# Patient Record
Sex: MALE | Race: OTHER | NOT HISPANIC OR LATINO
[De-identification: names, ages, dates, MRNs, and addresses within clinical notes are randomized per-mention and may not be internally consistent; named-entity substitution may affect disease eponyms.]

---
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
## 2022-01-03 ENCOUNTER — APPOINTMENT (OUTPATIENT)
Dept: PSYCHIATRY | Facility: CLINIC | Age: 22
End: 2022-01-03

## 2022-01-06 ENCOUNTER — APPOINTMENT (OUTPATIENT)
Dept: PSYCHIATRY | Facility: CLINIC | Age: 22
End: 2022-01-06

## 2022-01-10 ENCOUNTER — APPOINTMENT (OUTPATIENT)
Dept: PSYCHIATRY | Facility: CLINIC | Age: 22
End: 2022-01-10

## 2022-01-13 ENCOUNTER — APPOINTMENT (OUTPATIENT)
Dept: PSYCHIATRY | Facility: CLINIC | Age: 22
End: 2022-01-13

## 2022-01-20 ENCOUNTER — APPOINTMENT (OUTPATIENT)
Dept: PSYCHIATRY | Facility: CLINIC | Age: 22
End: 2022-01-20

## 2022-01-24 ENCOUNTER — APPOINTMENT (OUTPATIENT)
Dept: PSYCHIATRY | Facility: CLINIC | Age: 22
End: 2022-01-24

## 2022-01-24 ENCOUNTER — NON-APPOINTMENT (OUTPATIENT)
Age: 22
End: 2022-01-24

## 2022-01-27 ENCOUNTER — APPOINTMENT (OUTPATIENT)
Dept: PSYCHIATRY | Facility: CLINIC | Age: 22
End: 2022-01-27

## 2022-01-31 ENCOUNTER — APPOINTMENT (OUTPATIENT)
Dept: PSYCHIATRY | Facility: CLINIC | Age: 22
End: 2022-01-31

## 2022-02-03 ENCOUNTER — APPOINTMENT (OUTPATIENT)
Dept: PSYCHIATRY | Facility: CLINIC | Age: 22
End: 2022-02-03

## 2022-02-07 ENCOUNTER — APPOINTMENT (OUTPATIENT)
Dept: PSYCHIATRY | Facility: CLINIC | Age: 22
End: 2022-02-07

## 2022-02-10 ENCOUNTER — APPOINTMENT (OUTPATIENT)
Dept: PSYCHIATRY | Facility: CLINIC | Age: 22
End: 2022-02-10

## 2022-02-14 ENCOUNTER — APPOINTMENT (OUTPATIENT)
Dept: PSYCHIATRY | Facility: CLINIC | Age: 22
End: 2022-02-14

## 2022-02-17 ENCOUNTER — APPOINTMENT (OUTPATIENT)
Dept: PSYCHIATRY | Facility: CLINIC | Age: 22
End: 2022-02-17

## 2022-02-24 ENCOUNTER — APPOINTMENT (OUTPATIENT)
Dept: PSYCHIATRY | Facility: CLINIC | Age: 22
End: 2022-02-24

## 2022-02-28 ENCOUNTER — APPOINTMENT (OUTPATIENT)
Dept: PSYCHIATRY | Facility: CLINIC | Age: 22
End: 2022-02-28

## 2022-03-03 ENCOUNTER — APPOINTMENT (OUTPATIENT)
Dept: PSYCHIATRY | Facility: CLINIC | Age: 22
End: 2022-03-03

## 2022-03-07 ENCOUNTER — APPOINTMENT (OUTPATIENT)
Dept: PSYCHIATRY | Facility: CLINIC | Age: 22
End: 2022-03-07

## 2022-03-10 ENCOUNTER — APPOINTMENT (OUTPATIENT)
Dept: PSYCHIATRY | Facility: CLINIC | Age: 22
End: 2022-03-10

## 2022-03-14 ENCOUNTER — APPOINTMENT (OUTPATIENT)
Dept: PSYCHIATRY | Facility: CLINIC | Age: 22
End: 2022-03-14

## 2022-03-17 ENCOUNTER — APPOINTMENT (OUTPATIENT)
Dept: PSYCHIATRY | Facility: CLINIC | Age: 22
End: 2022-03-17

## 2022-03-21 ENCOUNTER — APPOINTMENT (OUTPATIENT)
Dept: PSYCHIATRY | Facility: CLINIC | Age: 22
End: 2022-03-21

## 2022-03-24 ENCOUNTER — APPOINTMENT (OUTPATIENT)
Dept: PSYCHIATRY | Facility: CLINIC | Age: 22
End: 2022-03-24

## 2022-03-28 ENCOUNTER — APPOINTMENT (OUTPATIENT)
Dept: PSYCHIATRY | Facility: CLINIC | Age: 22
End: 2022-03-28

## 2022-03-31 ENCOUNTER — APPOINTMENT (OUTPATIENT)
Dept: PSYCHIATRY | Facility: CLINIC | Age: 22
End: 2022-03-31

## 2022-04-04 ENCOUNTER — APPOINTMENT (OUTPATIENT)
Dept: PSYCHIATRY | Facility: CLINIC | Age: 22
End: 2022-04-04

## 2022-04-07 ENCOUNTER — APPOINTMENT (OUTPATIENT)
Dept: PSYCHIATRY | Facility: CLINIC | Age: 22
End: 2022-04-07

## 2022-04-11 ENCOUNTER — APPOINTMENT (OUTPATIENT)
Dept: PSYCHIATRY | Facility: CLINIC | Age: 22
End: 2022-04-11

## 2022-04-14 ENCOUNTER — APPOINTMENT (OUTPATIENT)
Dept: PSYCHIATRY | Facility: CLINIC | Age: 22
End: 2022-04-14

## 2022-04-18 ENCOUNTER — APPOINTMENT (OUTPATIENT)
Dept: PSYCHIATRY | Facility: CLINIC | Age: 22
End: 2022-04-18

## 2022-04-21 ENCOUNTER — APPOINTMENT (OUTPATIENT)
Dept: PSYCHIATRY | Facility: CLINIC | Age: 22
End: 2022-04-21

## 2022-04-25 ENCOUNTER — APPOINTMENT (OUTPATIENT)
Dept: PSYCHIATRY | Facility: CLINIC | Age: 22
End: 2022-04-25

## 2022-04-28 ENCOUNTER — APPOINTMENT (OUTPATIENT)
Dept: PSYCHIATRY | Facility: CLINIC | Age: 22
End: 2022-04-28

## 2022-05-02 ENCOUNTER — APPOINTMENT (OUTPATIENT)
Dept: PSYCHIATRY | Facility: CLINIC | Age: 22
End: 2022-05-02

## 2022-05-05 ENCOUNTER — APPOINTMENT (OUTPATIENT)
Dept: PSYCHIATRY | Facility: CLINIC | Age: 22
End: 2022-05-05

## 2022-05-09 ENCOUNTER — APPOINTMENT (OUTPATIENT)
Dept: PSYCHIATRY | Facility: CLINIC | Age: 22
End: 2022-05-09

## 2022-05-12 ENCOUNTER — APPOINTMENT (OUTPATIENT)
Dept: PSYCHIATRY | Facility: CLINIC | Age: 22
End: 2022-05-12

## 2022-05-16 ENCOUNTER — APPOINTMENT (OUTPATIENT)
Dept: PSYCHIATRY | Facility: CLINIC | Age: 22
End: 2022-05-16

## 2022-05-19 ENCOUNTER — APPOINTMENT (OUTPATIENT)
Dept: PSYCHIATRY | Facility: CLINIC | Age: 22
End: 2022-05-19

## 2022-05-23 ENCOUNTER — APPOINTMENT (OUTPATIENT)
Dept: PSYCHIATRY | Facility: CLINIC | Age: 22
End: 2022-05-23

## 2022-05-26 ENCOUNTER — APPOINTMENT (OUTPATIENT)
Dept: PSYCHIATRY | Facility: CLINIC | Age: 22
End: 2022-05-26

## 2022-06-02 ENCOUNTER — APPOINTMENT (OUTPATIENT)
Dept: PSYCHIATRY | Facility: CLINIC | Age: 22
End: 2022-06-02

## 2022-06-06 ENCOUNTER — APPOINTMENT (OUTPATIENT)
Dept: PSYCHIATRY | Facility: CLINIC | Age: 22
End: 2022-06-06

## 2022-06-09 ENCOUNTER — APPOINTMENT (OUTPATIENT)
Dept: PSYCHIATRY | Facility: CLINIC | Age: 22
End: 2022-06-09

## 2022-06-13 ENCOUNTER — APPOINTMENT (OUTPATIENT)
Dept: PSYCHIATRY | Facility: CLINIC | Age: 22
End: 2022-06-13

## 2022-06-16 ENCOUNTER — APPOINTMENT (OUTPATIENT)
Dept: PSYCHIATRY | Facility: CLINIC | Age: 22
End: 2022-06-16

## 2022-06-20 ENCOUNTER — APPOINTMENT (OUTPATIENT)
Dept: PSYCHIATRY | Facility: CLINIC | Age: 22
End: 2022-06-20

## 2022-06-23 ENCOUNTER — APPOINTMENT (OUTPATIENT)
Dept: PSYCHIATRY | Facility: CLINIC | Age: 22
End: 2022-06-23

## 2022-06-27 ENCOUNTER — APPOINTMENT (OUTPATIENT)
Dept: PSYCHIATRY | Facility: CLINIC | Age: 22
End: 2022-06-27

## 2022-06-30 ENCOUNTER — APPOINTMENT (OUTPATIENT)
Dept: PSYCHIATRY | Facility: CLINIC | Age: 22
End: 2022-06-30

## 2022-09-12 ENCOUNTER — APPOINTMENT (OUTPATIENT)
Dept: PSYCHIATRY | Facility: CLINIC | Age: 22
End: 2022-09-12

## 2022-09-15 ENCOUNTER — APPOINTMENT (OUTPATIENT)
Dept: PSYCHIATRY | Facility: CLINIC | Age: 22
End: 2022-09-15

## 2022-09-19 ENCOUNTER — APPOINTMENT (OUTPATIENT)
Dept: PSYCHIATRY | Facility: CLINIC | Age: 22
End: 2022-09-19

## 2022-09-19 NOTE — SOCIAL HISTORY
[With Family] : lives with family [Unemployed] : unemployed [Never ] : never  [High School] : high school [None] : none [FreeTextEntry1] : Living with Mother and Father in separate homes.  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

## 2022-09-19 NOTE — BEHAVIORAL HEALTH
[No] : no [FreeTextEntry2] : 1. Increase behavioral skills to reduce symptoms of anxiety and depression\par 2.Begin to identify and name experiences that build on sense of self\par 3. Begin to identify underlying emotions that drive maladaptive behaviors.  [Psychodynamic Therapy] : Psychodynamic Therapy  [Supportive Therapy] : Supportive Therapy [de-identified] : Pt arrived on time for session.  Pt discussed recent reunion with ex-girlfriend, as well as shifts in their dynamic since their break-up. Pt and writer discussed pros and cons of resuming a relationship with ex-girlfriend.  Pt ended session in good behavioral and emotional control.  [Recommended Frequency of Visits: ____] : Recommended frequency of visits: [unfilled] [Return in ____ week(s)] : Return in [unfilled] week(s) [FreeTextEntry1] : Pt will continue with twice weekly psychotherapy treatment.

## 2022-09-19 NOTE — REASON FOR VISIT
[Patient] : patient, [unfilled] is a ~age~ year old ~male/female~ being seen for a follow-up visit  [Duration of Psychotherapy Visit (minutes spent in synchronous communication): ____] : Duration of Psychotherapy Visit (minutes spent in synchronous communication): [unfilled] [Individual Psychotherapy for 38-52 minutes] : Individual Psychotherapy for 38 - 52 minutes [Teletherapy Service Provided] : The services provided in this session were delivered via tele-therapy [FreeTextEntry3] : SANGEETA RUTHERFORD  [FreeTextEntry5] : SANGEETA RUTHERFORD  [Home] : at home, [unfilled] , at the time of the visit. [Other Location: e.g. Home (Enter Location, City,State)___] : at [unfilled] [Verbal consent obtained from patient] : the patient, [unfilled]

## 2022-09-19 NOTE — END OF VISIT
[Duration of Psychotherapy Visit (minutes spent in synchronous communication): ____] : Duration of Psychotherapy Visit (minutes spent in synchronous communication): [unfilled] [Individual Psychotherapy for 38-52 minutes] : Individual Psychotherapy for 38 - 52 minutes [Teletherapy Service Provided] : The services provided in this session were delivered via tele-therapy [FreeTextEntry3] : SANGEETA Moon [FreeTextEntry5] : SANGEETA RUTHERFORD

## 2022-09-19 NOTE — ADDENDUM
[FreeTextEntry1] : .\par .\par Case discussed in clinical supervision Dr. Diann Barbosa  [[X ]] individual [[ ]] group (Check one)\par ASSESSMENT METHOD (Check all that apply): \par [[X ]] Case presentation \par [[ ]] Review of Record/Data \par [[ ]] Direct Observation \par [[ ]] Audio Recording \par [[  ]] Video Recording \par FOCUS OF SUPERVISION (Check all that apply): \par [[ ]] Diagnosis & Assessment \par [[ X]] Intervention \par [[ ]] Professional Conduct \par [[ X]] Culture and Diversity \par [[ ]] Scholarly Inquiry   \par [[ ]] Consultation \par [[ ]] Supervision \par [[ ]] Administration/Documentation \par

## 2022-09-22 ENCOUNTER — APPOINTMENT (OUTPATIENT)
Dept: PSYCHIATRY | Facility: CLINIC | Age: 22
End: 2022-09-22

## 2022-09-26 ENCOUNTER — APPOINTMENT (OUTPATIENT)
Dept: PSYCHIATRY | Facility: CLINIC | Age: 22
End: 2022-09-26

## 2022-09-29 ENCOUNTER — APPOINTMENT (OUTPATIENT)
Dept: PSYCHIATRY | Facility: CLINIC | Age: 22
End: 2022-09-29

## 2022-10-03 ENCOUNTER — NON-APPOINTMENT (OUTPATIENT)
Age: 22
End: 2022-10-03

## 2022-10-03 ENCOUNTER — APPOINTMENT (OUTPATIENT)
Dept: PSYCHIATRY | Facility: CLINIC | Age: 22
End: 2022-10-03

## 2022-10-06 ENCOUNTER — APPOINTMENT (OUTPATIENT)
Dept: PSYCHIATRY | Facility: CLINIC | Age: 22
End: 2022-10-06

## 2022-10-07 NOTE — BEHAVIORAL HEALTH
[Psychodynamic Therapy] : Psychodynamic Therapy  [Supportive Therapy] : Supportive Therapy [Recommended Frequency of Visits: ____] : Recommended frequency of visits: [unfilled] [Return in ____ week(s)] : Return in [unfilled] week(s) [FreeTextEntry2] : 1. Increase behavioral skills to reduce symptoms of anxiety and depression\par 2.Begin to identify and name experiences that build on sense of self\par 3. Begin to identify underlying emotions that drive maladaptive behaviors.  [de-identified] : Mr. Joya arrived a few minutes late for session.  Mr. Joya discussed a reduction in anxiety/panic symptoms this week.  Mr. Joya discussed confronting and addressing his feelings related to conflict with other.  Writer continued to work with Mr. Joya to manage and confront feelings related to anxiety.  Mr. Joya ended session in good behavioral and emotional control.  [FreeTextEntry1] : Pt will continue with twice weekly psychotherapy treatment.

## 2022-10-07 NOTE — REASON FOR VISIT
[Telehealth (audio & video) - Individual/Group] : This visit was provided via telehealth using real-time 2-way audio visual technology. [Other Location: e.g. Home (Enter Location, City,State)___] : The patient, [unfilled], was located at [unfilled] at the time of the visit. [Verbal consent obtained from patient/other participant(s)] : Verbal consent for telehealth/telephonic services obtained from patient/other participant(s) [Patient] : Patient [FreeTextEntry1] : Pt arrived for weekly psychotherapy session.

## 2022-10-07 NOTE — PHYSICAL EXAM
[Average] : average [Cooperative] : cooperative [Euthymic] : euthymic [Full] : full [Clear] : clear [Linear/Goal Directed] : linear/goal directed [WNL] : within normal limits [Not applicable] : not applicable

## 2022-10-07 NOTE — ADDENDUM
[FreeTextEntry1] : .\par .\par Case discussed in clinical supervision Dr. Flaquito Kimbrough  [[X ]] individual [[ ]] group (Check one)\par ASSESSMENT METHOD (Check all that apply): \par [[X ]] Case presentation \par [[ ]] Review of Record/Data \par [[ ]] Direct Observation \par [[ ]] Audio Recording \par [[  ]] Video Recording \par FOCUS OF SUPERVISION (Check all that apply): \par [[ ]] Diagnosis & Assessment \par [[ X]] Intervention \par [[ ]] Professional Conduct \par [[ ]] Culture and Diversity \par [[ ]] Scholarly Inquiry   \par [[ ]] Consultation \par [[ ]] Supervision \par [[ ]] Administration/Documentation \par

## 2022-10-10 ENCOUNTER — APPOINTMENT (OUTPATIENT)
Dept: PSYCHIATRY | Facility: CLINIC | Age: 22
End: 2022-10-10

## 2022-10-11 NOTE — REASON FOR VISIT
[Patient] : Patient [Telehealth (audio & video) - Individual/Group] : This visit was provided via telehealth using real-time 2-way audio visual technology. [Other Location: e.g. Home (Enter Location, City,State)___] : The patient, [unfilled], was located at [unfilled] at the time of the visit. [Verbal consent obtained from patient/other participant(s)] : Verbal consent for telehealth/telephonic services obtained from patient/other participant(s) [FreeTextEntry1] : Pt arrived for weekly psychotherapy session.

## 2022-10-11 NOTE — PLAN
[Psychodynamic Therapy] : Psychodynamic Therapy  [Supportive Therapy] : Supportive Therapy [Recommended Frequency of Visits: ____] : Recommended frequency of visits: [unfilled] [Return in ____ week(s)] : Return in [unfilled] week(s) [FreeTextEntry2] : 1. Increase behavioral skills to reduce symptoms of anxiety and depression\par 2.Begin to identify and name experiences that build on sense of self\par 3. Begin to identify underlying emotions that drive maladaptive behaviors.  [de-identified] : Mr. Joya arrived on time for session.  Mr. Joya reported feeling physically under the weather and appeared to be feeling physically unwell. Writer discussed care and rest during this time.  Mr. Joya decided to end session 20 minutes early to rest.  Mr. Joya ended session in good behavioral and emotional control.  [FreeTextEntry1] : Pt will continue with twice weekly psychotherapy treatment.

## 2022-10-13 ENCOUNTER — APPOINTMENT (OUTPATIENT)
Dept: PSYCHIATRY | Facility: CLINIC | Age: 22
End: 2022-10-13

## 2022-10-14 NOTE — PLAN
[Psychodynamic Therapy] : Psychodynamic Therapy  [Supportive Therapy] : Supportive Therapy [Recommended Frequency of Visits: ____] : Recommended frequency of visits: [unfilled] [Return in ____ week(s)] : Return in [unfilled] week(s) [FreeTextEntry2] : 1. Increase behavioral skills to reduce symptoms of anxiety and depression\par 2.Begin to identify and name experiences that build on sense of self\par 3. Begin to identify underlying emotions that drive maladaptive behaviors.  [de-identified] : Mr. Joya arrived on time for session.  Mr. Joya reported taking time off this week to rest and recover from illness.  Mr. Joya discussed navigating recent interpersonal family dynamics, as he figures out where to divide his time during the upcoming holidays between his mother and father.   Mr. Joya ended session in good behavioral and emotional control.  [FreeTextEntry1] : Pt will continue with twice weekly psychotherapy treatment.

## 2022-10-17 ENCOUNTER — APPOINTMENT (OUTPATIENT)
Dept: PSYCHIATRY | Facility: CLINIC | Age: 22
End: 2022-10-17

## 2022-10-20 ENCOUNTER — APPOINTMENT (OUTPATIENT)
Dept: PSYCHIATRY | Facility: CLINIC | Age: 22
End: 2022-10-20

## 2022-10-20 NOTE — PHYSICAL EXAM
[Average] : average [Cooperative] : cooperative [Euthymic] : euthymic [Clear] : clear [Full] : full [Linear/Goal Directed] : linear/goal directed [WNL] : within normal limits [Not applicable] : not applicable

## 2022-10-20 NOTE — PLAN
[Psychodynamic Therapy] : Psychodynamic Therapy  [Supportive Therapy] : Supportive Therapy [Recommended Frequency of Visits: ____] : Recommended frequency of visits: [unfilled] [Return in ____ week(s)] : Return in [unfilled] week(s) [FreeTextEntry2] : 1. Increase behavioral skills to reduce symptoms of anxiety and depression\par 2.Begin to identify and name experiences that build on sense of self\par 3. Begin to identify underlying emotions that drive maladaptive behaviors.  [de-identified] : Mr. Joya arrived on time for session.  Mr. Joya discussed challenges in academic semester related to burn out and loss of motivation.  Mr. Joya and writer explored root of procrastination and strategies for effective work planning/assignment completion.   Mr. Joya ended session in good behavioral and emotional control.  [FreeTextEntry1] : Pt will continue with twice weekly psychotherapy treatment.

## 2022-10-21 NOTE — PLAN
[Psychodynamic Therapy] : Psychodynamic Therapy  [Supportive Therapy] : Supportive Therapy [Recommended Frequency of Visits: ____] : Recommended frequency of visits: [unfilled] [Return in ____ week(s)] : Return in [unfilled] week(s) [FreeTextEntry2] : 1. Increase behavioral skills to reduce symptoms of anxiety and depression\par 2.Begin to identify and name experiences that build on sense of self\par 3. Begin to identify underlying emotions that drive maladaptive behaviors.  [de-identified] : Mr. Joya arrived on time for session.  Mr. Joya discussed difficulties across interpersonal functioning in social and home settings.  Writer used relational strategies to understand/acknowledge emotions that are present in interpersonal interactions.   Mr. Joya ended session in good behavioral and emotional control.  [FreeTextEntry1] : Pt will continue with twice weekly psychotherapy treatment.

## 2022-10-24 ENCOUNTER — APPOINTMENT (OUTPATIENT)
Dept: PSYCHIATRY | Facility: CLINIC | Age: 22
End: 2022-10-24

## 2022-10-26 NOTE — PLAN
[Psychodynamic Therapy] : Psychodynamic Therapy  [Supportive Therapy] : Supportive Therapy [Recommended Frequency of Visits: ____] : Recommended frequency of visits: [unfilled] [Return in ____ week(s)] : Return in [unfilled] week(s) [FreeTextEntry2] : 1. Increase behavioral skills to reduce symptoms of anxiety and depression\par 2.Begin to identify and name experiences that build on sense of self\par 3. Begin to identify underlying emotions that drive maladaptive behaviors.  [de-identified] : Mr. Joya arrived on time for session.  Mr. Joya discussed recent mood, specifically irritability.  Mr. Joya also continued to discuss low motivation and its impact on his academic demands at this time.  Writer worked with Mr. Joya to explore mood and its influence on different aspects of functioning.   Mr. Joya ended session in good behavioral and emotional control.  [FreeTextEntry1] : Pt will continue with twice weekly psychotherapy treatment.

## 2022-10-27 ENCOUNTER — APPOINTMENT (OUTPATIENT)
Dept: PSYCHIATRY | Facility: CLINIC | Age: 22
End: 2022-10-27

## 2022-10-28 NOTE — PLAN
[Psychodynamic Therapy] : Psychodynamic Therapy  [Supportive Therapy] : Supportive Therapy [Recommended Frequency of Visits: ____] : Recommended frequency of visits: [unfilled] [Return in ____ week(s)] : Return in [unfilled] week(s) [FreeTextEntry2] : 1. Increase behavioral skills to reduce symptoms of anxiety and depression\par 2.Begin to identify and name experiences that build on sense of self\par 3. Begin to identify underlying emotions that drive maladaptive behaviors.  [de-identified] : Mr. Joya arrived on time for session.  Mr. Joya discussed recent struggles with his memory and sharing his interests with others.  Writer worked with Mr. Joya to explore obstacles that get in the way of sharing/deepening emotional connection with others.   Mr. Joya ended session in good behavioral and emotional control.  [FreeTextEntry1] : Pt will continue with twice weekly psychotherapy treatment.

## 2022-10-31 ENCOUNTER — APPOINTMENT (OUTPATIENT)
Dept: PSYCHIATRY | Facility: CLINIC | Age: 22
End: 2022-10-31

## 2022-11-01 NOTE — PLAN
[Psychodynamic Therapy] : Psychodynamic Therapy  [Supportive Therapy] : Supportive Therapy [Recommended Frequency of Visits: ____] : Recommended frequency of visits: [unfilled] [Return in ____ week(s)] : Return in [unfilled] week(s) [FreeTextEntry2] : 1. Increase behavioral skills to reduce symptoms of anxiety and depression\par 2.Begin to identify and name experiences that build on sense of self\par 3. Begin to identify underlying emotions that drive maladaptive behaviors.  [de-identified] : Mr. Joya arrived on time for session.  Mr. Joya discussed parts of himself that he hopes to change as it relates to dynamics in his interpersonal relationships.  Writer worked with Mr. Joya to explore relational aspects of his own thoughts/feelings with others.   Mr. Joya ended session in good behavioral and emotional control.  [FreeTextEntry1] : Pt will continue with twice weekly psychotherapy treatment.

## 2022-11-03 ENCOUNTER — APPOINTMENT (OUTPATIENT)
Dept: PSYCHIATRY | Facility: CLINIC | Age: 22
End: 2022-11-03

## 2022-11-04 NOTE — PLAN
[Psychodynamic Therapy] : Psychodynamic Therapy  [Supportive Therapy] : Supportive Therapy [Recommended Frequency of Visits: ____] : Recommended frequency of visits: [unfilled] [Return in ____ week(s)] : Return in [unfilled] week(s) [FreeTextEntry2] : 1. Increase behavioral skills to reduce symptoms of anxiety and depression\par 2.Begin to identify and name experiences that build on sense of self\par 3. Begin to identify underlying emotions that drive maladaptive behaviors.  [de-identified] : Mr. Joya arrived on time for session.  Mr. Joya discussed recent interactions with his father and grandparents.  Mr. Joya has begun to explore feelings of frustration towards his family. Writer continued to work with Mr. Joya to explore relational aspects of his own thoughts/feelings with others.   Mr. Joya ended session in good behavioral and emotional control.  [FreeTextEntry1] : Pt will continue with twice weekly psychotherapy treatment.

## 2022-11-07 ENCOUNTER — APPOINTMENT (OUTPATIENT)
Dept: PSYCHIATRY | Facility: CLINIC | Age: 22
End: 2022-11-07

## 2022-11-10 ENCOUNTER — APPOINTMENT (OUTPATIENT)
Dept: PSYCHIATRY | Facility: CLINIC | Age: 22
End: 2022-11-10

## 2022-11-11 NOTE — PLAN
[Psychodynamic Therapy] : Psychodynamic Therapy  [Recommended Frequency of Visits: ____] : Recommended frequency of visits: [unfilled] [Return in ____ week(s)] : Return in [unfilled] week(s) [FreeTextEntry2] : 1. Increase behavioral skills to reduce symptoms of anxiety and depression\par 2.Begin to identify and name experiences that build on sense of self\par 3. Begin to identify underlying emotions that drive maladaptive behaviors.  [de-identified] : Mr. Joya arrived on time for session.  Mr. Joya discussed navigating recent conflict and feelings of frustration towards father.  Mr. Joya explored wishes for feeling more connected with family, specifically his father, while beginning to separate and solidify his own identity.  Writer continued to work with Mr. Joya to explore relational aspects of his own thoughts/feelings with others.   Mr. Joya ended session in good behavioral and emotional control.  [FreeTextEntry1] : Pt will continue with twice weekly psychotherapy treatment.

## 2022-11-11 NOTE — PLAN
[Psychodynamic Therapy] : Psychodynamic Therapy  [Supportive Therapy] : Supportive Therapy [Recommended Frequency of Visits: ____] : Recommended frequency of visits: [unfilled] [Return in ____ week(s)] : Return in [unfilled] week(s) [FreeTextEntry2] : 1. Increase behavioral skills to reduce symptoms of anxiety and depression\par 2.Begin to identify and name experiences that build on sense of self\par 3. Begin to identify underlying emotions that drive maladaptive behaviors.  [de-identified] : Mr. Joya arrived on time for session.  Mr. Joya discussed recent loss of motivation related to school work. Writer continued to work with Mr. Joya to explore relational aspects of his own thoughts/feelings with others.  Mr. Joya left session 15 minutes early to meet with professors related to a semester academic event.  Mr. Joya ended session in good behavioral and emotional control.  [FreeTextEntry1] : Pt will continue with twice weekly psychotherapy treatment.

## 2022-11-14 ENCOUNTER — APPOINTMENT (OUTPATIENT)
Dept: PSYCHIATRY | Facility: CLINIC | Age: 22
End: 2022-11-14

## 2022-11-16 NOTE — PLAN
[Psychodynamic Therapy] : Psychodynamic Therapy  [Recommended Frequency of Visits: ____] : Recommended frequency of visits: [unfilled] [Return in ____ week(s)] : Return in [unfilled] week(s) [FreeTextEntry2] : 1. Increase behavioral skills to reduce symptoms of anxiety and depression\par 2.Begin to identify and name experiences that build on sense of self\par 3. Begin to identify underlying emotions that drive maladaptive behaviors.  [de-identified] : Mr. Joya arrived on time for session.  Mr. Joya discussed recent class trip to historic site and connections to identity and interest. Mr. Joya also spoke about exploring new relationship and feeling hopeful about new romantic connections.  Writer continued to work with Mr. Joya to explore relational aspects of his own thoughts/feelings with others.   Mr. Joya ended session in good behavioral and emotional control.  [FreeTextEntry1] : Pt will continue with twice weekly psychotherapy treatment.

## 2022-11-17 ENCOUNTER — APPOINTMENT (OUTPATIENT)
Dept: PSYCHIATRY | Facility: CLINIC | Age: 22
End: 2022-11-17

## 2022-11-18 NOTE — PLAN
[Psychodynamic Therapy] : Psychodynamic Therapy  [Recommended Frequency of Visits: ____] : Recommended frequency of visits: [unfilled] [Return in ____ week(s)] : Return in [unfilled] week(s) [FreeTextEntry2] : 1. Increase behavioral skills to reduce symptoms of anxiety and depression\par 2.Begin to identify and name experiences that build on sense of self\par 3. Begin to identify underlying emotions that drive maladaptive behaviors.  [de-identified] : Mr. Joya arrived on time for session.  Mr. Joya discussed recent budding romantic interest as well as navigating his time next week at home with his father for the upcoming holidays.  Writer continued to work with Mr. Joya to explore relational aspects of his own thoughts/feelings with others.   Mr. Joya ended session in good behavioral and emotional control.  [FreeTextEntry1] : Pt will continue with twice weekly psychotherapy treatment.

## 2022-11-21 ENCOUNTER — APPOINTMENT (OUTPATIENT)
Dept: PSYCHIATRY | Facility: CLINIC | Age: 22
End: 2022-11-21

## 2022-11-28 ENCOUNTER — APPOINTMENT (OUTPATIENT)
Dept: PSYCHIATRY | Facility: CLINIC | Age: 22
End: 2022-11-28

## 2022-11-30 NOTE — PLAN
[Psychodynamic Therapy] : Psychodynamic Therapy  [Recommended Frequency of Visits: ____] : Recommended frequency of visits: [unfilled] [Return in ____ week(s)] : Return in [unfilled] week(s) [FreeTextEntry2] : 1. Increase behavioral skills to reduce symptoms of anxiety and depression\par 2.Begin to identify and name experiences that build on sense of self\par 3. Begin to identify underlying emotions that drive maladaptive behaviors.  [de-identified] : Mr. Joya arrived on time for session.  Mr. Joya discussed recent holidays with family and meeting father's partner.  Mr. Joya discussed low motivation and avoidance for finishing academic semester.  Writer worked with Mr. Joya to strategize behavioral skills and strategies to support successful completion of academic responsibilities. Mr. Joya ended session in good behavioral and emotional control.  [FreeTextEntry1] : Pt will continue with twice weekly psychotherapy treatment.

## 2022-12-01 ENCOUNTER — APPOINTMENT (OUTPATIENT)
Dept: PSYCHIATRY | Facility: CLINIC | Age: 22
End: 2022-12-01

## 2022-12-02 NOTE — PLAN
[Psychodynamic Therapy] : Psychodynamic Therapy  [Recommended Frequency of Visits: ____] : Recommended frequency of visits: [unfilled] [Return in ____ week(s)] : Return in [unfilled] week(s) [FreeTextEntry2] : 1. Increase behavioral skills to reduce symptoms of anxiety and depression\par 2.Begin to identify and name experiences that build on sense of self\par 3. Begin to identify underlying emotions that drive maladaptive behaviors.  [de-identified] : Mr. Joya arrived on time for session.  Mr. Joya discussed current dynamics in his relationship with his father.  Writer listened empathically to validate emotional experiences of Mr. Joya. Mr. Joya ended session in good behavioral and emotional control.  [FreeTextEntry1] : Pt will continue with twice weekly psychotherapy treatment.

## 2022-12-05 ENCOUNTER — APPOINTMENT (OUTPATIENT)
Dept: PSYCHIATRY | Facility: CLINIC | Age: 22
End: 2022-12-05

## 2022-12-08 ENCOUNTER — APPOINTMENT (OUTPATIENT)
Dept: PSYCHIATRY | Facility: CLINIC | Age: 22
End: 2022-12-08

## 2022-12-08 NOTE — PLAN
[Psychodynamic Therapy] : Psychodynamic Therapy  [Recommended Frequency of Visits: ____] : Recommended frequency of visits: [unfilled] [Return in ____ week(s)] : Return in [unfilled] week(s) [FreeTextEntry2] : 1. Increase behavioral skills to reduce symptoms of anxiety and depression\par 2.Begin to identify and name experiences that build on sense of self\par 3. Begin to identify underlying emotions that drive maladaptive behaviors.  [de-identified] : Mr. Joya arrived on time for session.  Mr. Joya discussed recent feelings of overwhelm related to the end of the semester.  Mr. Joya discussed feelings of pressure in meeting expectations of his father and grandparents.  Writer listened empathically to validate emotional experiences of Mr. Joya as well as to make sense of feelings of sadness/passive SI. Mr. Joya ended session in good behavioral and emotional control.  [FreeTextEntry1] : Pt will continue with twice weekly psychotherapy treatment.

## 2022-12-08 NOTE — RISK ASSESSMENT
[Yes, patient reports ideation or behavior] : Yes, patient reports ideation or behavior [No, patient denies ideation or behavior] : No, patient denies ideation or behavior [Yes] : Yes [No] : No

## 2022-12-12 ENCOUNTER — APPOINTMENT (OUTPATIENT)
Dept: PSYCHIATRY | Facility: CLINIC | Age: 22
End: 2022-12-12

## 2022-12-12 NOTE — PLAN
[Psychodynamic Therapy] : Psychodynamic Therapy  [Recommended Frequency of Visits: ____] : Recommended frequency of visits: [unfilled] [Return in ____ week(s)] : Return in [unfilled] week(s) [FreeTextEntry2] : 1. Increase behavioral skills to reduce symptoms of anxiety and depression\par 2.Begin to identify and name experiences that build on sense of self\par 3. Begin to identify underlying emotions that drive maladaptive behaviors.  [de-identified] : Mr. Joya arrived on time for session.  Mr. Joya discussed continued feelings of fatigue related to end of semester as well as holding expectations placed on him from father and grandfather.  Writer explored possible medication consultation with Mr. Joya.  Mr. Joya agreed to consider medication but chose to hold off at this time.  Mr. Joya ended session in good behavioral and emotional control.  [FreeTextEntry1] : Pt will continue with twice weekly psychotherapy treatment.

## 2022-12-15 ENCOUNTER — APPOINTMENT (OUTPATIENT)
Dept: PSYCHIATRY | Facility: CLINIC | Age: 22
End: 2022-12-15

## 2022-12-19 ENCOUNTER — APPOINTMENT (OUTPATIENT)
Dept: PSYCHIATRY | Facility: CLINIC | Age: 22
End: 2022-12-19

## 2022-12-21 NOTE — PLAN
[Psychodynamic Therapy] : Psychodynamic Therapy  [Recommended Frequency of Visits: ____] : Recommended frequency of visits: [unfilled] [Return in ____ week(s)] : Return in [unfilled] week(s) [FreeTextEntry2] : 1. Increase behavioral skills to reduce symptoms of anxiety and depression\par 2.Begin to identify and name experiences that build on sense of self\par 3. Begin to identify underlying emotions that drive maladaptive behaviors.  [de-identified] : Mr. Joya arrived on time for session.  Mr. Joya discussed stress and exhaustion related to his final exams week.  Mr. Joya also discussed recent conflict with his father regarding his class schedule for next semester.  Writer worked with Mr. Joya to highlight and acknowledge particularly intense and strong negative affect (i.e. anger) in session towards others.  Mr. Joya ended session in good behavioral and emotional control.  [FreeTextEntry1] : Pt will continue with twice weekly psychotherapy treatment.

## 2022-12-22 ENCOUNTER — APPOINTMENT (OUTPATIENT)
Dept: PSYCHIATRY | Facility: CLINIC | Age: 22
End: 2022-12-22

## 2022-12-23 NOTE — PLAN
[Psychodynamic Therapy] : Psychodynamic Therapy  [Recommended Frequency of Visits: ____] : Recommended frequency of visits: [unfilled] [Return in ____ week(s)] : Return in [unfilled] week(s) [FreeTextEntry2] : 1. Increase behavioral skills to reduce symptoms of anxiety and depression\par 2.Begin to identify and name experiences that build on sense of self\par 3. Begin to identify underlying emotions that drive maladaptive behaviors.  [de-identified] : Mr. Joya arrived on time for session.  Mr. Joya discussed increased stress upon returning home and spending time with family during the holidays.  Writer continued to work with Mr. Joya to highlight and acknowledge particularly intense and strong negative affect (i.e. anger) in session towards others.  Mr. Joya ended session in good behavioral and emotional control.  [FreeTextEntry1] : Pt will continue with twice weekly psychotherapy treatment.

## 2022-12-29 ENCOUNTER — APPOINTMENT (OUTPATIENT)
Dept: PSYCHIATRY | Facility: CLINIC | Age: 22
End: 2022-12-29

## 2023-01-05 ENCOUNTER — APPOINTMENT (OUTPATIENT)
Dept: PSYCHIATRY | Facility: CLINIC | Age: 23
End: 2023-01-05

## 2023-01-06 NOTE — PLAN
[Psychodynamic Therapy] : Psychodynamic Therapy  [Recommended Frequency of Visits: ____] : Recommended frequency of visits: [unfilled] [Return in ____ week(s)] : Return in [unfilled] week(s) [FreeTextEntry2] : 1. Increase behavioral skills to reduce symptoms of anxiety and depression\par 2.Begin to identify and name experiences that build on sense of self\par 3. Begin to identify underlying emotions that drive maladaptive behaviors.  [de-identified] : Mr. Joya arrived on time for session.  Mr. Joya discussed spending time with his mother and her boyfriend during the break.  Mr. Joya also discussed recent stresses during the holidays with his grandparents and father.  Mr. Joya stated that he would like "to better his mental health" before the upcoming spring semester.  Writer discussed strategies and skills during moments of peak anxiety while also discussing a medication intervention and psychiatry referral.  Mr. Joya ended session in good behavioral and emotional control.  [FreeTextEntry1] : Pt will continue with twice weekly psychotherapy treatment.  Writer will complete a referral for a psychiatry consult.

## 2023-01-09 ENCOUNTER — APPOINTMENT (OUTPATIENT)
Dept: PSYCHIATRY | Facility: CLINIC | Age: 23
End: 2023-01-09

## 2023-01-11 NOTE — PLAN
[Psychodynamic Therapy] : Psychodynamic Therapy  [Recommended Frequency of Visits: ____] : Recommended frequency of visits: [unfilled] [Return in ____ week(s)] : Return in [unfilled] week(s) [FreeTextEntry2] : 1. Increase behavioral skills to reduce symptoms of anxiety and depression\par 2.Begin to identify and name experiences that build on sense of self\par 3. Begin to identify underlying emotions that drive maladaptive behaviors.  [de-identified] : Mr. Joya arrived on time for session.  Mr. Joya discussed last few days at home with family before returning to school.  Mr. Joya reported stress related to interactions with family while continuing to prepare for the upcoming spring semester.  Writer continued to work with Mr. Joya on skills and strategies to address anxiety symptoms and depression.  Writer and Mr. Joya discussed expectations regarding the upcoming psychiatry referral.  Mr. Joya discussed  Mr. Joya ended session in good behavioral and emotional control.  [FreeTextEntry1] : Pt will continue with twice weekly psychotherapy treatment.  Psychiatry consult scheduled for  1/26/23.

## 2023-01-12 ENCOUNTER — APPOINTMENT (OUTPATIENT)
Dept: PSYCHIATRY | Facility: CLINIC | Age: 23
End: 2023-01-12

## 2023-01-15 NOTE — PLAN
[Psychodynamic Therapy] : Psychodynamic Therapy  [Recommended Frequency of Visits: ____] : Recommended frequency of visits: [unfilled] [Return in ____ week(s)] : Return in [unfilled] week(s) [FreeTextEntry2] : 1. Increase behavioral skills to reduce symptoms of anxiety and depression\par 2.Begin to identify and name experiences that build on sense of self\par 3. Begin to identify underlying emotions that drive maladaptive behaviors.  [de-identified] : Mr. Joya arrived on time for session.  Mr. Joya discussed returning to school as well as continuing to prepare for upcoming spring semester. Mr. Joya reports continued low motivation, even ahead of academic demands.  Writer worked to highlight Mr. Joya's emotional experiences.  Mr. Joya ended session in good behavioral and emotional control.  [FreeTextEntry1] : Pt will continue with twice weekly psychotherapy treatment.  Psychiatry consult scheduled for  1/26/23.

## 2023-01-15 NOTE — REASON FOR VISIT
[Patient preference] : as per patient preference [Telehealth (audio & video) - Individual/Group] : This visit was provided via telehealth using real-time 2-way audio visual technology. [Verbal consent obtained from patient/other participant(s)] : Verbal consent for telehealth/telephonic services obtained from patient/other participant(s) [Patient] : Patient [Other Location: e.g. Home (Enter Location, City,State)___] : The provider was located at [unfilled]. [FreeTextEntry4] : 4:45 [FreeTextEntry5] : 5:30 [FreeTextEntry1] : Pt arrived for weekly psychotherapy session.

## 2023-01-19 ENCOUNTER — APPOINTMENT (OUTPATIENT)
Dept: PSYCHIATRY | Facility: CLINIC | Age: 23
End: 2023-01-19

## 2023-01-20 NOTE — PLAN
[Psychodynamic Therapy] : Psychodynamic Therapy  [Recommended Frequency of Visits: ____] : Recommended frequency of visits: [unfilled] [Return in ____ week(s)] : Return in [unfilled] week(s) [FreeTextEntry2] : 1. Increase behavioral skills to reduce symptoms of anxiety and depression\par 2.Begin to identify and name experiences that build on sense of self\par 3. Begin to identify underlying emotions that drive maladaptive behaviors.  [de-identified] : Mr. Joya arrived on time for session.  Mr. Joya discussed navigating accepting as well as asking for help from his family while he prepares for the academic demands of his semester. Writer worked to highlight Mr. Joya's emotional experiences as well as reflecting on his own needs and explicitly communicating in order to have those needs met.  Mr. Joya ended session in good behavioral and emotional control.  [FreeTextEntry1] : Pt will continue with twice weekly psychotherapy treatment.  Psychiatry consult scheduled for  1/26/23.

## 2023-01-20 NOTE — REASON FOR VISIT
[Patient preference] : as per patient preference [Telehealth (audio & video) - Individual/Group] : This visit was provided via telehealth using real-time 2-way audio visual technology. [Other Location: e.g. Home (Enter Location, City,State)___] : The patient, [unfilled], was located at [unfilled] at the time of the visit. [Verbal consent obtained from patient/other participant(s)] : Verbal consent for telehealth/telephonic services obtained from patient/other participant(s) [Patient] : Patient [FreeTextEntry4] : 4:45 [FreeTextEntry5] : 5:30 [FreeTextEntry1] : Pt arrived for weekly psychotherapy session.

## 2023-01-23 ENCOUNTER — APPOINTMENT (OUTPATIENT)
Dept: PSYCHIATRY | Facility: CLINIC | Age: 23
End: 2023-01-23

## 2023-01-24 NOTE — PLAN
[Psychodynamic Therapy] : Psychodynamic Therapy  [Recommended Frequency of Visits: ____] : Recommended frequency of visits: [unfilled] [Return in ____ week(s)] : Return in [unfilled] week(s) [FreeTextEntry2] : 1. Increase behavioral skills to reduce symptoms of anxiety and depression\par 2.Begin to identify and name experiences that build on sense of self\par 3. Begin to identify underlying emotions that drive maladaptive behaviors.  [de-identified] : Mr. Joya arrived on time for session.  Mr. Joya discussed his current preparation for his academic semester while planning to manage symptoms of anxiety.  Mr. Joya ended session 15 minutes early due to lack of private space.   Mr. Joya ended session in good behavioral and emotional control.  [FreeTextEntry1] : Pt will continue with twice weekly psychotherapy treatment.  Psychiatry consult scheduled for  1/26/23.

## 2023-01-26 ENCOUNTER — APPOINTMENT (OUTPATIENT)
Dept: PSYCHIATRY | Facility: CLINIC | Age: 23
End: 2023-01-26
Payer: COMMERCIAL

## 2023-01-26 VITALS — HEIGHT: 72 IN | BODY MASS INDEX: 25.06 KG/M2 | WEIGHT: 185 LBS

## 2023-01-26 PROCEDURE — 99215 OFFICE O/P EST HI 40 MIN: CPT | Mod: 95
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
## 2023-01-26 NOTE — PAST MEDICAL HISTORY
[FreeTextEntry1] : Patient has been in psychotherapy treatment since July 2018 with Linda Connell at the psychological center at [facility].  Patient terminated treatment there in May 2021.  Patient has also completed a neuropsychological testing in March 2018.  Patient was diagnosed with IZZY, ADHD, inattentive type, moderate, and MDD, recurring, moderate. Patient denies past psychiatric hospitalization.  Patient was prescribed Adderall 15 mg from pediatrician in August 2020. \par

## 2023-01-26 NOTE — PLAN
[Medication education provided] : Medication education provided. [Rationale for medication choices, possible risks/precautions, benefits, alternative treatment choices, and consequences of non-treatment discussed] : Rationale for medication choices, possible risks/precautions, benefits, alternative treatment choices, and consequences of non-treatment discussed with patient/family/caregiver  [Order(s) for medication placed in [place] EHR] : Medication [FreeTextEntry5] : PLAN: #) Initiate trial of fluoxetine 20mg PO QD #) Continue adderall 15mg PO QD prescribed by juliana #) Continue I therapy #) F/U in 2 weeks

## 2023-01-26 NOTE — SOCIAL HISTORY
[With Family] : lives with family [Unemployed] : unemployed [Never ] : never  [High School] : high school [None] : none [Yes] : yes [FreeTextEntry1] : Living with Mother and Father in separate homes.  Client spends academic year dorming at [employer]  [TextBox_7] : infrequent social use [FreeTextEntry2] : infrequent social use

## 2023-01-26 NOTE — FAMILY HISTORY
[FreeTextEntry1] : Patient denies any family psychiatric history.  However, patient reports that both parents have struggled with substance use.

## 2023-01-26 NOTE — END OF VISIT
[] : Resident [FreeTextEntry3] : Patient is an 21yo  (black/), cis gender, heterosexual male domiciled with his father, rising sophomore at [employer], with no significant PMHx, and PPHx significant for ADHD, IZZY, MDD recurrent, currently prescribed Adderall 15mg PO TID by pediatrician, in psychotherapy with Arielle Goldberg for the past 3 year. Patient does not have a hx of psych hospitalization, suicide attempts, or psych ER visits. Pt. is the only child of  parents. Stressors include interpersonal distress related to relationship with parents and GF. Patient endorses inattentive sxs of ADHD, IZZY, and depression. He reports recent SI but denies plan or intent. No access to fire arms. He also reports recent episodes of binging with recent 25 lb weight gain. Fam Hx: Parents both with hx of substance use. NKDA. Pt denies SI/HI/AVH. \par \par PLAN: #) [facility] – may benefit from psychiatric services but declined #) Pediatrician is treating ADHD – continue meds as prescribed Adderall 15mg PO TID\par  [Time Spent: ___ minutes] : I have spent [unfilled] minutes of time on the encounter.

## 2023-01-26 NOTE — RISK ASSESSMENT
[No, patient denies ideation or behavior] : No, patient denies ideation or behavior [Yes] : Yes [No] : No [FreeTextEntry9] : Low acute risk. Passive Si with no plan or intent. No hx of attempts. Protective factors include family support, future orientation and connection to care.

## 2023-01-26 NOTE — REASON FOR VISIT
[Patient preference] : as per patient preference [Continuing, patient not seen in-person within last 12 months (provide details below)] : Telehealth services are continuing, patient not seen in-person within last 12 months.  [Telehealth (audio & video) - Individual/Group] : This visit was provided via telehealth using real-time 2-way audio visual technology. [Other Location: e.g. Home (Enter Location, City,State)___] : The provider was located at [unfilled]. [Verbal consent obtained from patient/other participant(s)] : Verbal consent for telehealth/telephonic services obtained from patient/other participant(s) [Patient] : Patient [FreeTextEntry4] : 03:00p [FreeTextEntry5] : 03:40p [FreeTextEntry1] : Psychiatric follow up

## 2023-01-26 NOTE — DISCUSSION/SUMMARY
[FreeTextEntry1] : Patient is a 21yo  (black/), cis gender, heterosexual male domiciled with his father, rising sophomore at [employer], with no significant PMHx, and PPHx significant for ADHD, IZZY, MDD recurrent, currently prescribed Adderall 15mg PO TID by pediatrician, in psychotherapy with Linda Connell for many years. Patient does not have a hx of psych hospitalization, suicide attempts, or psych ER visits. Pt. is the only child of  parents. Stressors include interpersonal distress related to relationship with parents and GF. Patient endorses inattentive sxs of ADHD, IZZY, and depression. He present for medication consultation in the context of worsening mood and increased anxiety.\par \par Time Spent: Reviewing intake, chart notes, therapist email, developing rapport, full history gathering, psychoeducation, diagnosis, medication mgmt, reviewed risks vs benefits of meds, indication, and potential adverse effects. \par \par

## 2023-01-26 NOTE — CURRENT PSYCHIATRIC SYMPTOMS
[Depressed Mood] : depressed mood [Decreased Concentration] : decreased concentrating ability [Insomnia] : insomnia [Hypersomnia] : hypersomnia

## 2023-01-26 NOTE — PHYSICAL EXAM
[Average] : average [Cooperative] : cooperative [Euthymic] : euthymic [Full] : full [Clear] : clear [Linear/Goal Directed] : linear/goal directed [None] : none [None Reported] : none reported [WNL] : within normal limits

## 2023-01-27 NOTE — PLAN
[Psychodynamic Therapy] : Psychodynamic Therapy  [Recommended Frequency of Visits: ____] : Recommended frequency of visits: [unfilled] [Return in ____ week(s)] : Return in [unfilled] week(s) [FreeTextEntry2] : 1. Increase behavioral skills to reduce symptoms of anxiety and depression\par 2.Begin to identify and name experiences that build on sense of self\par 3. Begin to identify underlying emotions that drive maladaptive behaviors.  [de-identified] : Mr. Joya arrived on time for session.  Mr. Joya discussed his recent psychiatric consultation as well as feelings of nervousness related to beginning an antidepressant for the first time.  Mr. Joya discussed hopefulness related to steps to "better his mental health."   Mr. Joya ended session in good behavioral and emotional control.  [FreeTextEntry1] : Pt will continue with twice weekly psychotherapy treatment.  Psychiatry consult scheduled for  1/26/23.

## 2023-01-30 ENCOUNTER — APPOINTMENT (OUTPATIENT)
Dept: PSYCHIATRY | Facility: CLINIC | Age: 23
End: 2023-01-30

## 2023-01-31 NOTE — PLAN
[Psychodynamic Therapy] : Psychodynamic Therapy  [Recommended Frequency of Visits: ____] : Recommended frequency of visits: [unfilled] [Return in ____ week(s)] : Return in [unfilled] week(s) [FreeTextEntry2] : 1. Increase behavioral skills to reduce symptoms of anxiety and depression\par 2.Begin to identify and name experiences that build on sense of self\par 3. Begin to identify underlying emotions that drive maladaptive behaviors.  [de-identified] : Mr. Joya arrived on time for session.  Mr. Joya discussed beginning prozac 20mg.  Mr. Joya discussed strategies to support his relationship with his father and grandparents as well as preparing for his academic semester.  Writer validated Mr. Joya's emotional experiences.  Mr. Joya ended session in good behavioral and emotional control.  [FreeTextEntry1] : Pt will continue with twice weekly psychotherapy treatment.  Psychiatry consult scheduled for  1/26/23.

## 2023-02-02 ENCOUNTER — APPOINTMENT (OUTPATIENT)
Dept: PSYCHIATRY | Facility: CLINIC | Age: 23
End: 2023-02-02

## 2023-02-06 ENCOUNTER — APPOINTMENT (OUTPATIENT)
Dept: PSYCHIATRY | Facility: CLINIC | Age: 23
End: 2023-02-06

## 2023-02-09 ENCOUNTER — APPOINTMENT (OUTPATIENT)
Dept: PSYCHIATRY | Facility: CLINIC | Age: 23
End: 2023-02-09

## 2023-02-10 ENCOUNTER — APPOINTMENT (OUTPATIENT)
Dept: PSYCHIATRY | Facility: CLINIC | Age: 23
End: 2023-02-10
Payer: COMMERCIAL

## 2023-02-10 PROCEDURE — 99214 OFFICE O/P EST MOD 30 MIN: CPT | Mod: 95

## 2023-02-10 NOTE — PLAN
[Medication education provided] : Medication education provided. [Rationale for medication choices, possible risks/precautions, benefits, alternative treatment choices, and consequences of non-treatment discussed] : Rationale for medication choices, possible risks/precautions, benefits, alternative treatment choices, and consequences of non-treatment discussed with patient/family/caregiver  [Order(s) for medication placed in [place] EHR] : Medication [FreeTextEntry4] : Recently started meds to address sxs of anxiety, depression, binge eating. [FreeTextEntry5] : PLAN: #) Continue trial of fluoxetine 20mg PO QD #) Continue adderall 15mg PO QD prescribed by juliana #) Continue I therapy #) F/U in 2 weeks

## 2023-02-10 NOTE — PHYSICAL EXAM
[Average] : average [Cooperative] : cooperative [Full] : full [Clear] : clear [Linear/Goal Directed] : linear/goal directed [None] : none [None Reported] : none reported [WNL] : within normal limits [Depressed] : depressed

## 2023-02-10 NOTE — REASON FOR VISIT
[Patient preference] : as per patient preference [Continuing, patient not seen in-person within last 12 months (provide details below)] : Telehealth services are continuing, patient not seen in-person within last 12 months.  [Telehealth (audio & video) - Individual/Group] : This visit was provided via telehealth using real-time 2-way audio visual technology. [Other Location: e.g. Home (Enter Location, City,State)___] : The provider was located at [unfilled]. [Home] : The patient, [unfilled], was located at home, [unfilled], at the time of the visit. [Verbal consent obtained from patient/other participant(s)] : Verbal consent for telehealth/telephonic services obtained from patient/other participant(s) [Patient] : Patient [FreeTextEntry4] : 10:30a [FreeTextEntry5] : 11:00a [FreeTextEntry1] : Psychiatric follow up

## 2023-02-10 NOTE — PAST MEDICAL HISTORY
[FreeTextEntry1] : Patient has been in psychotherapy treatment since July 2018 with Linda Connell at the psychological center at [facility].  Patient terminated treatment there in May 2021.  Patient has also completed a neuropsychological testing in March 2018.  Patient was diagnosed with IZZY, ADHD, inattentive type, moderate, and MDD, recurring, moderate. Patient denies past psychiatric hospitalization.  Patient was prescribed Adderall 15 mg from pediatrician in August 2020. \par

## 2023-02-10 NOTE — DISCUSSION/SUMMARY
[FreeTextEntry1] : Patient is a 21yo  (black/), cis gender, heterosexual male domiciled with his father, rising sophomore at [employer], with no significant PMHx, and PPHx significant for ADHD, IZZY, MDD recurrent, currently prescribed Adderall 15mg PO TID by pediatrician, in psychotherapy with Linda Connell for many years. Patient does not have a hx of psych hospitalization, suicide attempts, or psych ER visits. Pt. is the only child of  parents. Stressors include interpersonal distress related to relationship with parents and GF. Patient endorses inattentive sxs of ADHD, IZZY, and depression. He present for medication consultation in the context of worsening mood and increased anxiety.\par \par Time Spent: Reviewing chart, developing rapport, psychoeducation, diagnosis, medication mgmt, reviewed risks vs benefits of meds, indication, and potential adverse effects. \par \par

## 2023-02-10 NOTE — SOCIAL HISTORY
[With Family] : lives with family [Unemployed] : unemployed [Never ] : never  [High School] : high school [None] : none [Yes] : yes [FreeTextEntry1] : Living with Mother and Father in separate homes.  Client spends academic year dorming at [employer]  [TextBox_7] : infrequent social use [FreeTextEntry2] : infrequent social use

## 2023-02-10 NOTE — HISTORY OF PRESENT ILLNESS
[FreeTextEntry1] : Pt is seen and evaluated. Interval hx reviewed. Pt started fluoxetine 20mg, 2 weeks ago. He was initially taking meds in the morning but he felt somnolent and switched to evening dosing. He reports improved sleep and no longer feels tired in the morning. He has yet to experience any reduction in sxs of depression, anxiety, binge eating. He shares that his father is dismissive of his decision to start medication and concerned about the impact of meds. Pt and his therapist will be meeting with pts father to discuss how to improve communication. \par \par Note: has been dx with adhd inattentive type, IZZY, and MDD with neuropsych testing in 2018. Prescribed adderall 15mg by pediatrician, Dr. Gloria [phone]. (Takes adderall in afternoon to help with homework.) Pt has been in I therapy with Linda Connell for the past 5 years.

## 2023-02-13 ENCOUNTER — APPOINTMENT (OUTPATIENT)
Dept: PSYCHIATRY | Facility: CLINIC | Age: 23
End: 2023-02-13

## 2023-02-14 NOTE — PLAN
[Psychodynamic Therapy] : Psychodynamic Therapy  [Recommended Frequency of Visits: ____] : Recommended frequency of visits: [unfilled] [Return in ____ week(s)] : Return in [unfilled] week(s) [FreeTextEntry2] : 1. Increase behavioral skills to reduce symptoms of anxiety and depression\par 2.Begin to identify and name experiences that build on sense of self\par 3. Begin to identify underlying emotions that drive maladaptive behaviors.  [de-identified] : Mr. Joya arrived on time for session.  Mr. Joya discussed side effects related to beginning prozac. Mr. Joya denied any side effects, aside from fatigue.  Mr. Joya discussed recent conversation with his father and his worries about his beginning an antidepressant.  Writer validated Mr. Joya's emotional experiences.  Mr. Joya ended session in good behavioral and emotional control.  [FreeTextEntry1] : Pt will continue with twice weekly psychotherapy treatment.  Psychiatry consult scheduled for 1/26/23.

## 2023-02-14 NOTE — PLAN
[Psychodynamic Therapy] : Psychodynamic Therapy  [Recommended Frequency of Visits: ____] : Recommended frequency of visits: [unfilled] [Return in ____ week(s)] : Return in [unfilled] week(s) [FreeTextEntry2] : 1. Increase behavioral skills to reduce symptoms of anxiety and depression\par 2.Begin to identify and name experiences that build on sense of self\par 3. Begin to identify underlying emotions that drive maladaptive behaviors.  [de-identified] : Mr. Joya arrived on time for session.  Mr. Joya discussed with writer his own desires for autonomy while relying on his parent's support to make decisions for him..  Mr. Joya discussed with writer a collateral session with father about recent start of pyschopharm.  Writer validated Mr. Joya's emotional experiences.  Mr. Joya ended session in good behavioral and emotional control.  [FreeTextEntry1] : Pt will continue with twice weekly psychotherapy treatment.  Psychiatry consult scheduled for 1/26/23.

## 2023-02-16 ENCOUNTER — APPOINTMENT (OUTPATIENT)
Dept: PSYCHIATRY | Facility: CLINIC | Age: 23
End: 2023-02-16

## 2023-02-17 NOTE — REASON FOR VISIT
[Patient preference] : as per patient preference [Telehealth (audio & video) - Individual/Group] : This visit was provided via telehealth using real-time 2-way audio visual technology. [Other Location: e.g. Home (Enter Location, City,State)___] : The patient, [unfilled], was located at [unfilled] at the time of the visit. [Verbal consent obtained from patient/other participant(s)] : Verbal consent for telehealth/telephonic services obtained from patient/other participant(s) [Patient with collateral] : Patient with collateral  [Father] : father [FreeTextEntry4] : 4:45 [FreeTextEntry5] : 5:30 [FreeTextEntry1] : Pt arrived for weekly psychotherapy session.

## 2023-02-17 NOTE — PLAN
[Psychodynamic Therapy] : Psychodynamic Therapy  [Recommended Frequency of Visits: ____] : Recommended frequency of visits: [unfilled] [Return in ____ week(s)] : Return in [unfilled] week(s) [FreeTextEntry2] : 1. Increase behavioral skills to reduce symptoms of anxiety and depression\par 2.Begin to identify and name experiences that build on sense of self\par 3. Begin to identify underlying emotions that drive maladaptive behaviors.  [de-identified] : Mr. Joya arrived on time for session.  Mr. Joya and writer met with Mr. Joya's father regarding recent start of psychopharm as well as problem solving and exploring differing communication styles with a strategy for effective communication between the two.  Writer validated and acknowledged Mr. Joya's emotional experiences with assistance in communicating his own feelings to his father.  Mr. Joya ended session in good behavioral and emotional control.  [FreeTextEntry1] : Pt will continue with twice weekly psychotherapy treatment.   Continued psychopharm with Dr. Meyer.

## 2023-02-23 ENCOUNTER — APPOINTMENT (OUTPATIENT)
Dept: PSYCHIATRY | Facility: CLINIC | Age: 23
End: 2023-02-23

## 2023-02-26 NOTE — PLAN
[Psychodynamic Therapy] : Psychodynamic Therapy  [Recommended Frequency of Visits: ____] : Recommended frequency of visits: [unfilled] [Return in ____ week(s)] : Return in [unfilled] week(s) [FreeTextEntry2] : 1. Increase behavioral skills to reduce symptoms of anxiety and depression\par 2.Begin to identify and name experiences that build on sense of self\par 3. Begin to identify underlying emotions that drive maladaptive behaviors.  [de-identified] : Mr. Joya arrived on time for session.  Mr. Joya discussed previous session with father and helpfulness of communication strategies going forward while planning with his father.  Writer validated Mr. Joya's emotional experiences.  Mr. Joya ended session in good behavioral and emotional control.  [FreeTextEntry1] : Pt will continue with twice weekly psychotherapy treatment.  Collateral session with father and patient present scheduled for 2/16.  Continued psychopharm with Dr. Meyer.

## 2023-02-26 NOTE — PLAN
[Psychodynamic Therapy] : Psychodynamic Therapy  [Recommended Frequency of Visits: ____] : Recommended frequency of visits: [unfilled] [Return in ____ week(s)] : Return in [unfilled] week(s) [FreeTextEntry2] : 1. Increase behavioral skills to reduce symptoms of anxiety and depression\par 2.Begin to identify and name experiences that build on sense of self\par 3. Begin to identify underlying emotions that drive maladaptive behaviors.  [de-identified] : Mr. Joya arrived on time for session.  Mr. Joya discussed previous session with father and helpfulness of communication strategies going forward while planning with his father.  Writer validated Mr. Joya's emotional experiences.  Mr. Joya ended session in good behavioral and emotional control.  [FreeTextEntry1] : Pt will continue with twice weekly psychotherapy treatment.  Collateral session with father and patient present scheduled for 2/16.  Continued psychopharm with Dr. Meyer.

## 2023-02-26 NOTE — PLAN
[Psychodynamic Therapy] : Psychodynamic Therapy  [Recommended Frequency of Visits: ____] : Recommended frequency of visits: [unfilled] [Return in ____ week(s)] : Return in [unfilled] week(s) [FreeTextEntry2] : 1. Increase behavioral skills to reduce symptoms of anxiety and depression\par 2.Begin to identify and name experiences that build on sense of self\par 3. Begin to identify underlying emotions that drive maladaptive behaviors.  [de-identified] : Mr. Joya arrived on time for session.  Mr. Joya discussed previous session with father and helpfulness of communication strategies going forward while planning with his father.  Writer validated Mr. Joya's emotional experiences.  Mr. Joya ended session in good behavioral and emotional control.  [FreeTextEntry1] : Pt will continue with twice weekly psychotherapy treatment.  Collateral session with father and patient present scheduled for 2/16.  Continued psychopharm with Dr. Meyer.

## 2023-02-27 ENCOUNTER — APPOINTMENT (OUTPATIENT)
Dept: PSYCHIATRY | Facility: CLINIC | Age: 23
End: 2023-02-27

## 2023-02-27 NOTE — RISK ASSESSMENT
[Yes, patient reports ideation or behavior] : Yes, patient reports ideation or behavior [No, patient denies ideation or behavior] : No, patient denies ideation or behavior [Yes] : Yes [No] : No [Low acute suicide risk] : Low acute suicide risk [Not clinically indicated] : Safety Plan completed/updated (for individuals at risk): Not clinically indicated

## 2023-03-02 ENCOUNTER — APPOINTMENT (OUTPATIENT)
Dept: PSYCHIATRY | Facility: CLINIC | Age: 23
End: 2023-03-02
Payer: COMMERCIAL

## 2023-03-02 PROCEDURE — 99214 OFFICE O/P EST MOD 30 MIN: CPT | Mod: 95

## 2023-03-02 NOTE — PLAN
[Medication education provided] : Medication education provided. [Rationale for medication choices, possible risks/precautions, benefits, alternative treatment choices, and consequences of non-treatment discussed] : Rationale for medication choices, possible risks/precautions, benefits, alternative treatment choices, and consequences of non-treatment discussed with patient/family/caregiver  [Order(s) for medication placed in [facility] EHR] : Medication [FreeTextEntry4] : Recently started meds to address sxs of anxiety, depression, binge eating. [FreeTextEntry5] : PLAN: #) Continue trial of fluoxetine 20mg PO QD #) Continue adderall 15mg PO QD prescribed by juliana #) Continue I therapy #) F/U in 2 weeks

## 2023-03-02 NOTE — REASON FOR VISIT
[Patient preference] : as per patient preference [Telehealth (audio & video) - Individual/Group] : This visit was provided via telehealth using real-time 2-way audio visual technology. [Other Location: e.g. Home (Enter Location, City,State)___] : The patient, [unfilled], was located at [unfilled] at the time of the visit. [Verbal consent obtained from patient/other participant(s)] : Verbal consent for telehealth/telephonic services obtained from patient/other participant(s) [Patient with collateral] : Patient with collateral  [Father] : father [FreeTextEntry4] : 4:30 [FreeTextEntry5] : 5:15 [FreeTextEntry1] : Pt arrived for weekly psychotherapy session.

## 2023-03-02 NOTE — PLAN
[Medication education provided] : Medication education provided. [Rationale for medication choices, possible risks/precautions, benefits, alternative treatment choices, and consequences of non-treatment discussed] : Rationale for medication choices, possible risks/precautions, benefits, alternative treatment choices, and consequences of non-treatment discussed with patient/family/caregiver  [Order(s) for medication placed in [facility] EHR] : Medication [FreeTextEntry4] : Recently started meds to address sxs of anxiety, depression, binge eating. [FreeTextEntry5] : PLAN: #) Continue trial of fluoxetine 20mg PO QD #) Continue adderall 15mg PO QD prescribed by juliana #) Continue I therapy #) F/U in 2 weeks

## 2023-03-02 NOTE — PAST MEDICAL HISTORY
[FreeTextEntry1] : Patient has been in psychotherapy treatment since July 2018 with Linda Connell at the psychological center at [facility].  Patient terminated treatment there in May 2021.  Patient has also completed a neuropsychological testing in March 2018.  Patient was diagnosed with IZZY, ADHD, inattentive type, moderate, and MDD, recurring, moderate. Patient denies past psychiatric hospitalization.  Patient was prescribed Adderall 15 mg from pediatrician in August 2020. \par

## 2023-03-02 NOTE — SOCIAL HISTORY
[With Family] : lives with family [Unemployed] : unemployed [Never ] : never  [High School] : high school [None] : none [Yes] : yes [FreeTextEntry1] : Living with Mother and Father in separate homes.  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

## 2023-03-02 NOTE — SOCIAL HISTORY
[With Family] : lives with family [Unemployed] : unemployed [Never ] : never  [High School] : high school [None] : none [Yes] : yes [FreeTextEntry1] : Living with Mother and Father in separate homes.  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  [TextBox_7] : infrequent social use [FreeTextEntry2] : infrequent social use

## 2023-03-02 NOTE — HISTORY OF PRESENT ILLNESS
[FreeTextEntry1] : Pt is seen and evaluated. Interval hx reviewed. Pt started fluoxetine 20mg, 5 weeks ago. He reports feeling improvement in the past week. He has not engaged in binge eating in the past week. He reports improved mood, less pacing, less binging, waking up with more energy, finding it easier to start the day, having more motivation,getting back into exercising. He reports less rumination and negative thinking. He reports improved sleep and no longer feels tired in the morning. He continues to reports some social anxiety. He denies any recent panic attacks.\par \par Note: has been dx with adhd inattentive type, IZZY, and MDD with neuropsych testing in 2018. Prescribed adderall 15mg by pediatrician, Dr. Gloria [phone]. (Takes adderall in afternoon to help with homework.) Pt has been in I therapy with Linda Connell for the past 5 years.

## 2023-03-02 NOTE — PLAN
[Psychodynamic Therapy] : Psychodynamic Therapy  [Recommended Frequency of Visits: ____] : Recommended frequency of visits: [unfilled] [Return in ____ week(s)] : Return in [unfilled] week(s) [FreeTextEntry2] : 1. Increase behavioral skills to reduce symptoms of anxiety and depression\par 2.Begin to identify and name experiences that build on sense of self\par 3. Begin to identify underlying emotions that drive maladaptive behaviors.  [de-identified] : Mr. Joya arrived on time for session.  Mr. Joya reported an interest in also meeting with his mother for a session to discuss issues around communication..  Writer validated and acknowledged Mr. Joya's emotional experiences with assistance in communicating his own feelings to his mother.  Mr. Joya ended session in good behavioral and emotional control.  [FreeTextEntry1] : Pt will continue with twice weekly psychotherapy treatment.   Continued psychopharm with Dr. Meyer.

## 2023-03-02 NOTE — PAST MEDICAL HISTORY
[FreeTextEntry1] : Patient has been in psychotherapy treatment since July 2018 with Linda Connell at the psychological center at [facility].  Patient terminated treatment there in May 2021.  Patient has also completed a neuropsychological testing in March 2018.  Patient was diagnosed with IZZY, ADHD, inattentive type, moderate, and MDD, recurring, moderate. Patient denies past psychiatric hospitalization.  Patient was prescribed Adderall 15 mg from pediatrician in August 2020. \par

## 2023-03-02 NOTE — PHYSICAL EXAM
[Average] : average [Cooperative] : cooperative [Depressed] : depressed [Full] : full [Clear] : clear [Linear/Goal Directed] : linear/goal directed [None] : none [None Reported] : none reported [WNL] : within normal limits

## 2023-03-02 NOTE — HISTORY OF PRESENT ILLNESS
[FreeTextEntry1] : Pt is seen and evaluated. Interval hx reviewed. Pt started fluoxetine 20mg, 5 weeks ago. He reports feeling improvement in the past week. He has not engaged in binge eating in the past week. He reports improved mood, less pacing, less binging, waking up with more energy, finding it easier to start the day, having more motivation,getting back into exercising. He reports less rumination and negative thinking. He reports improved sleep and no longer feels tired in the morning. He continues to reports some social anxiety. He denies any recent panic attacks.\par \par Note: has been dx with adhd inattentive type, IZZY, and MDD with neuropsych testing in 2018. Prescribed adderall 15mg by pediatrician, Dr. Gloria [phone]. (Takes adderall in afternoon to help with homework.) Pt has been in I therapy with Linda Connell for the past 5 years.

## 2023-03-02 NOTE — DISCUSSION/SUMMARY
[FreeTextEntry1] : Patient is a 21yo  (black/), cis gender, heterosexual male domiciled with his father, rising sophomore at [employer] , with no significant PMHx, and PPHx significant for ADHD, IZZY, MDD recurrent, currently prescribed Adderall 15mg PO TID by pediatrician, in psychotherapy with Linda Connell for many years. Patient does not have a hx of psych hospitalization, suicide attempts, or psych ER visits. Pt. is the only child of  parents. Stressors include interpersonal distress related to relationship with parents and GF. Patient endorsed inattentive sxs of ADHD, IZZY, and depression and presented for medication consultation in the context of worsening mood and increased anxiety in Jan 2023 - started on Fluoxetine with good tolerance and response. \par \par Time Spent: Reviewing chart, developing rapport, psychoeducation, diagnosis, medication mgmt, reviewed risks vs benefits of meds, indication, and potential adverse effects. Shared decision to continue at current dose in light of improvements. Annual Health assessment completed.\par \par Name of PCP: Dr. Kimberley Gates\par Date of Last Physical Exam: 2019 (reminded to scheduled annual exam.)\par Date of Last Annual Labs: Cannot recall\par Annual Review of Systems Completed (Y/N): Y\par Tobacco Screening Completed (Y/N): Y (non-smoker)\par \par

## 2023-03-02 NOTE — DISCUSSION/SUMMARY
[FreeTextEntry1] : Patient is a 23yo  (black/), cis gender, heterosexual male domiciled with his father, rising sophomore at [employer] , with no significant PMHx, and PPHx significant for ADHD, IZZY, MDD recurrent, currently prescribed Adderall 15mg PO TID by pediatrician, in psychotherapy with Linda Connell for many years. Patient does not have a hx of psych hospitalization, suicide attempts, or psych ER visits. Pt. is the only child of  parents. Stressors include interpersonal distress related to relationship with parents and GF. Patient endorsed inattentive sxs of ADHD, IZZY, and depression and presented for medication consultation in the context of worsening mood and increased anxiety in Jan 2023 - started on Fluoxetine with good tolerance and response. \par \par Time Spent: Reviewing chart, developing rapport, psychoeducation, diagnosis, medication mgmt, reviewed risks vs benefits of meds, indication, and potential adverse effects. Shared decision to continue at current dose in light of improvements. Annual Health assessment completed.\par \par Name of PCP: Dr. Kimberley Gates\par Date of Last Physical Exam: 2019 (reminded to scheduled annual exam.)\par Date of Last Annual Labs: Cannot recall\par Annual Review of Systems Completed (Y/N): Y\par Tobacco Screening Completed (Y/N): Y (non-smoker)\par \par

## 2023-03-03 NOTE — PLAN
[Psychodynamic Therapy] : Psychodynamic Therapy  [Recommended Frequency of Visits: ____] : Recommended frequency of visits: [unfilled] [Return in ____ week(s)] : Return in [unfilled] week(s) [FreeTextEntry2] : 1. Increase behavioral skills to reduce symptoms of anxiety and depression\par 2.Begin to identify and name experiences that build on sense of self\par 3. Begin to identify underlying emotions that drive maladaptive behaviors.  [de-identified] : Mr. Joya arrived on time for session.  Mr. Joya reported recent improved mood as well as returned interest in hobbies (i.e. playing guitar).  Mr. Joya acknowledged improved sleep as a result of psychopharm intervention.  Mr. Joya also reported decreased anxiety, however social anxiety symptoms remain.  Mr. Joya denied any symptoms of mian. Writer validated and acknowledged Mr. Joya's emotional experiences.  Mr. Joya ended session in good behavioral and emotional control.  [FreeTextEntry1] : Pt will continue with twice weekly psychotherapy treatment.   Continued psychopharm with Dr. Meyer.

## 2023-03-06 ENCOUNTER — APPOINTMENT (OUTPATIENT)
Dept: PSYCHIATRY | Facility: CLINIC | Age: 23
End: 2023-03-06

## 2023-03-08 NOTE — DISCUSSION/SUMMARY
[Plan Review] : Plan Review [Attempting to realize their potential] : Attempting to realize their potential [Motivated to participate in treatment] : motivated to participate in treatment [Part of a supportive family] : part of a supportive family [English fluency] : English fluency [Mental Health] : Mental Health [Continued - Progress made] : Continued - Progress made: [___ times a week] : [unfilled] times a week [Improvement in symptoms as evidenced by:] : Improvement in symptoms as evidenced by: [Yes] : Yes [Supervisor (if needed)] : Supervisor [Unknown - Patient unable to answer] : Unknown - patient unable to answer [Yes: Details:___] : Yes: [unfilled] [No] : No [Does patient use tobacco products?] : Patient does not use tobacco products [Current or past COVID-19 diagnosis?] : Patient had a present or past COVID-19 diagnosis [FreeTextEntry2] : 8/1/23 [FreeTextEntry3] : 7/21/21 [FreeTextEntry1] : Pt will continue to identify and manage symptoms of anxiety [FreeTextEntry4] : "I want to better my mental health to improve my academic life." [de-identified] : Pt will continue to utilize coping skills to effectively plan and organize himself for academic demands.  [de-identified] : Pt will continue building coping skills [de-identified] : 8/1/23 [de-identified] : Pt will identify problem solving skills and increase decision making.  [FreeTextEntry5] : Pt continues to identify ineffective behaviors to support more effective organization and planning and reduce feelings of overwhelm and anxiety. [de-identified] : effective coping skills and observed independence

## 2023-03-08 NOTE — PHYSICAL EXAM
[Average] : average [Cooperative] : cooperative [Euthymic] : euthymic [Full] : full [Clear] : clear [Linear/Goal Directed] : linear/goal directed [WNL] : within normal limits [Not applicable] : not applicable [2 - 2  to 4 times a month] : 2 - 2  to 4 times a month [3 - 5 or 6] : 3 - 5 or 6 [3 - Weekly] : 3 - Weekly [0 - Never] : 0 - Never [0 - No] : 0 - No [2 - Yes, but not in the last year] : 2 - Yes, but not in the last year [3] : 3 [4] : 4 [2] : 2 [] : No [FreeTextEntry1] : 10 [SchwartzScore] : 28

## 2023-03-08 NOTE — DISCUSSION/SUMMARY
[Plan Review] : Plan Review [Attempting to realize their potential] : Attempting to realize their potential [Motivated to participate in treatment] : motivated to participate in treatment [Part of a supportive family] : part of a supportive family [English fluency] : English fluency [Mental Health] : Mental Health [Continued - Progress made] : Continued - Progress made: [___ times a week] : [unfilled] times a week [Improvement in symptoms as evidenced by:] : Improvement in symptoms as evidenced by: [Yes] : Yes [Supervisor (if needed)] : Supervisor [Unknown - Patient unable to answer] : Unknown - patient unable to answer [Yes: Details:___] : Yes: [unfilled] [No] : No [Does patient use tobacco products?] : Patient does not use tobacco products [Current or past COVID-19 diagnosis?] : Patient had a present or past COVID-19 diagnosis [FreeTextEntry2] : 8/1/23 [FreeTextEntry3] : 7/21/21 [FreeTextEntry1] : Pt will continue to identify and manage symptoms of anxiety [FreeTextEntry4] : "I want to better my mental health to improve my academic life." [de-identified] : Pt will continue to utilize coping skills to effectively plan and organize himself for academic demands.  [de-identified] : Pt will continue building coping skills [de-identified] : 8/1/23 [de-identified] : Pt will identify problem solving skills and increase decision making.  [FreeTextEntry5] : Pt continues to identify ineffective behaviors to support more effective organization and planning and reduce feelings of overwhelm and anxiety. [de-identified] : effective coping skills and observed independence

## 2023-03-08 NOTE — DISCUSSION/SUMMARY
[Plan Review] : Plan Review [Attempting to realize their potential] : Attempting to realize their potential [Motivated to participate in treatment] : motivated to participate in treatment [Part of a supportive family] : part of a supportive family [English fluency] : English fluency [Mental Health] : Mental Health [Continued - Progress made] : Continued - Progress made: [___ times a week] : [unfilled] times a week [Improvement in symptoms as evidenced by:] : Improvement in symptoms as evidenced by: [Yes] : Yes [Supervisor (if needed)] : Supervisor [Unknown - Patient unable to answer] : Unknown - patient unable to answer [Yes: Details:___] : Yes: [unfilled] [No] : No [Does patient use tobacco products?] : Patient does not use tobacco products [Current or past COVID-19 diagnosis?] : Patient had a present or past COVID-19 diagnosis [FreeTextEntry2] : 8/1/23 [FreeTextEntry3] : 7/21/21 [FreeTextEntry1] : Pt will continue to identify and manage symptoms of anxiety [FreeTextEntry4] : "I want to better my mental health to improve my academic life." [de-identified] : Pt will continue to utilize coping skills to effectively plan and organize himself for academic demands.  [de-identified] : Pt will continue building coping skills [de-identified] : 8/1/23 [de-identified] : Pt will identify problem solving skills and increase decision making.  [FreeTextEntry5] : Pt continues to identify ineffective behaviors to support more effective organization and planning and reduce feelings of overwhelm and anxiety. [de-identified] : effective coping skills and observed independence

## 2023-03-09 ENCOUNTER — APPOINTMENT (OUTPATIENT)
Dept: PSYCHIATRY | Facility: CLINIC | Age: 23
End: 2023-03-09

## 2023-03-09 NOTE — PLAN
[Psychodynamic Therapy] : Psychodynamic Therapy  [Recommended Frequency of Visits: ____] : Recommended frequency of visits: [unfilled] [Return in ____ week(s)] : Return in [unfilled] week(s) [FreeTextEntry2] : 1. Increase behavioral skills to reduce symptoms of anxiety and depression\par 2.Begin to identify and name experiences that build on sense of self\par 3. Begin to identify underlying emotions that drive maladaptive behaviors.  [de-identified] : Mr. Joya arrived on time for session.  Mr. Joya discussed recent improved mood and reduced anxiety symptoms. Writer worked with Mr. Joya to begin to explore aspects of sense of self due to increased comfort in communication ability and verbal expression.   Mr. Joya ended session in good behavioral and emotional control.  [FreeTextEntry1] : Pt will continue with twice weekly psychotherapy treatment.   Continued psychopharm with Dr. Meyer.

## 2023-03-09 NOTE — PLAN
[Psychodynamic Therapy] : Psychodynamic Therapy  [Recommended Frequency of Visits: ____] : Recommended frequency of visits: [unfilled] [Return in ____ week(s)] : Return in [unfilled] week(s) [FreeTextEntry2] : 1. Increase behavioral skills to reduce symptoms of anxiety and depression\par 2.Begin to identify and name experiences that build on sense of self\par 3. Begin to identify underlying emotions that drive maladaptive behaviors.  [de-identified] : Mr. Joya arrived on time for session.  Mr. Joya discussed recent weekend visit with his father and his grandparents.  While Mr. Joya acknowledged positive experiences with his family visiting him, he also discussed frustrations and feelings of panic related to their visit as well. Writer worked with Mr. Joya to validate his emotional experiences.   Mr. Joya ended session in good behavioral and emotional control.  [FreeTextEntry1] : Pt will continue with twice weekly psychotherapy treatment.   Continued psychopharm with Dr. Meyer.

## 2023-03-13 ENCOUNTER — APPOINTMENT (OUTPATIENT)
Dept: PSYCHIATRY | Facility: CLINIC | Age: 23
End: 2023-03-13

## 2023-03-14 NOTE — PLAN
[Psychodynamic Therapy] : Psychodynamic Therapy  [Recommended Frequency of Visits: ____] : Recommended frequency of visits: [unfilled] [Return in ____ week(s)] : Return in [unfilled] week(s) [FreeTextEntry2] : 1. Increase behavioral skills to reduce symptoms of anxiety and depression\par 2.Begin to identify and name experiences that build on sense of self\par 3. Begin to identify underlying emotions that drive maladaptive behaviors.  [de-identified] : Mr. Joya arrived on time for session.  Mr. Joya discussed returning home for spring break and spending time with his family.  Mr. Joya acknowledged effective communication and progress with conflict with close family.  Writer worked with Mr. Joya to begin to explore aspects of sense of self due to increased comfort in communication ability and verbal expression.   Mr. Joya ended session in good behavioral and emotional control.  [FreeTextEntry1] : Pt will continue with twice weekly psychotherapy treatment.   Continued psychopharm with Dr. Meyer.

## 2023-03-16 ENCOUNTER — APPOINTMENT (OUTPATIENT)
Dept: PSYCHIATRY | Facility: CLINIC | Age: 23
End: 2023-03-16

## 2023-03-17 NOTE — PLAN
[Psychodynamic Therapy] : Psychodynamic Therapy  [Recommended Frequency of Visits: ____] : Recommended frequency of visits: [unfilled] [Return in ____ week(s)] : Return in [unfilled] week(s) [FreeTextEntry2] : 1. Increase behavioral skills to reduce symptoms of anxiety and depression\par 2.Begin to identify and name experiences that build on sense of self\par 3. Begin to identify underlying emotions that drive maladaptive behaviors.  [de-identified] : Mr. Joya arrived on time for session.  Mr. oJya discussed spending time with his mother during his spring break.  Mr. Joya continues to report improved mood and reduced anxiety symptoms as a result of prozac.  Mr. Joya continues to learn strategies related to effective time management for academic demands to successfully manage anxiety.  Mr. Joya ended session in good behavioral and emotional control.  [FreeTextEntry1] : Pt will continue with twice weekly psychotherapy treatment.   Continued psychopharm with Dr. Meyer.

## 2023-03-20 ENCOUNTER — APPOINTMENT (OUTPATIENT)
Dept: PSYCHIATRY | Facility: CLINIC | Age: 23
End: 2023-03-20

## 2023-03-22 NOTE — PLAN
[Psychodynamic Therapy] : Psychodynamic Therapy  [Recommended Frequency of Visits: ____] : Recommended frequency of visits: [unfilled] [Return in ____ week(s)] : Return in [unfilled] week(s) [FreeTextEntry2] : 1. Increase behavioral skills to reduce symptoms of anxiety and depression\par 2.Begin to identify and name experiences that build on sense of self\par 3. Begin to identify underlying emotions that drive maladaptive behaviors.  [de-identified] : Mr. Joya arrived on time for session.  Mr. Joya discussed returning back to school and increased interest in socializing and spending time with friends.  Mr. Joya continues to report improved mood and reduced anxiety symptoms as a result of prozac.  Mr. Joya continues to learn strategies related to effective time management for academic demands to successfully manage anxiety.  Mr. Joya ended session in good behavioral and emotional control.  [FreeTextEntry1] : Pt will continue with twice weekly psychotherapy treatment.   Continued psychopharm with Dr. Meyer.

## 2023-03-23 ENCOUNTER — APPOINTMENT (OUTPATIENT)
Dept: PSYCHIATRY | Facility: CLINIC | Age: 23
End: 2023-03-23

## 2023-03-24 NOTE — PLAN
[Psychodynamic Therapy] : Psychodynamic Therapy  [Recommended Frequency of Visits: ____] : Recommended frequency of visits: [unfilled] [Return in ____ week(s)] : Return in [unfilled] week(s) [FreeTextEntry2] : 1. Increase behavioral skills to reduce symptoms of anxiety and depression\par 2.Begin to identify and name experiences that build on sense of self\par 3. Begin to identify underlying emotions that drive maladaptive behaviors.  [de-identified] : Mr. Joya arrived on time for session.  Mr. Joya discussed returning to once a week psychotherapy due to improved mood and reduced anxiety symptoms as a result of prozac.  Mr. Joya discussed renewed interest in meeting new people and making new friends or possibly a romantic connection. Mr. Joya ended session in good behavioral and emotional control.  [FreeTextEntry1] : Pt will continue with weekly psychotherapy treatment.   Continued psychopharm with Dr. Meyer.

## 2023-03-27 ENCOUNTER — APPOINTMENT (OUTPATIENT)
Dept: PSYCHIATRY | Facility: CLINIC | Age: 23
End: 2023-03-27

## 2023-03-30 ENCOUNTER — APPOINTMENT (OUTPATIENT)
Dept: PSYCHIATRY | Facility: CLINIC | Age: 23
End: 2023-03-30

## 2023-03-30 NOTE — PLAN
[Psychodynamic Therapy] : Psychodynamic Therapy  [Recommended Frequency of Visits: ____] : Recommended frequency of visits: [unfilled] [Return in ____ week(s)] : Return in [unfilled] week(s) [FreeTextEntry2] : 1. Increase behavioral skills to reduce symptoms of anxiety and depression\par 2.Begin to identify and name experiences that build on sense of self\par 3. Begin to identify underlying emotions that drive maladaptive behaviors.  [de-identified] : Mr. Joya arrived on time for session.  Mr. Joya discussed returning to dating as well as exploring and meeting new people.  Mr. Joya expressed ambivalence related to meeting others and showing his full and authentic self to others. Mr. Joya ended session in good behavioral and emotional control.  [FreeTextEntry1] : Pt will continue with weekly psychotherapy treatment.   Continued psychopharm with Dr. Meyer.

## 2023-04-03 ENCOUNTER — APPOINTMENT (OUTPATIENT)
Dept: PSYCHIATRY | Facility: CLINIC | Age: 23
End: 2023-04-03

## 2023-04-06 ENCOUNTER — APPOINTMENT (OUTPATIENT)
Dept: PSYCHIATRY | Facility: CLINIC | Age: 23
End: 2023-04-06
Payer: COMMERCIAL

## 2023-04-06 PROCEDURE — 99214 OFFICE O/P EST MOD 30 MIN: CPT | Mod: 95

## 2023-04-06 NOTE — REASON FOR VISIT
[Patient preference] : as per patient preference [Continuing, patient not seen in-person within last 12 months (provide details below)] : Telehealth services are continuing, patient not seen in-person within last 12 months.  [Telehealth (audio & video) - Individual/Group] : This visit was provided via telehealth using real-time 2-way audio visual technology. [Other Location: e.g. Home (Enter Location, City,State)___] : The provider was located at [unfilled]. [Home] : The patient, [unfilled], was located at home, [unfilled], at the time of the visit. [Verbal consent obtained from patient/other participant(s)] : Verbal consent for telehealth/telephonic services obtained from patient/other participant(s) [Patient] : Patient [FreeTextEntry4] : 10:00a [FreeTextEntry5] : 10:30a [FreeTextEntry1] : Psychiatric follow up

## 2023-04-06 NOTE — DISCUSSION/SUMMARY
[FreeTextEntry1] : Patient is a 21yo  (black/), cis gender, heterosexual male domiciled with his father, rising sophomore at [employer] , with no significant PMHx, and PPHx significant for ADHD, IZZY, MDD recurrent, currently prescribed Adderall 15mg PO TID by pediatrician, in psychotherapy with Linda Connell for many years. Patient does not have a hx of psych hospitalization, suicide attempts, or psych ER visits. Pt. is the only child of  parents. Stressors include interpersonal distress related to relationship with parents and GF. Patient endorsed inattentive sxs of ADHD, IZZY, and depression and presented for medication consultation in the context of worsening mood and increased anxiety in Jan 2023 - started on Fluoxetine with good tolerance and response. \par \par Time Spent: Reviewing chart, developing rapport, psychoeducation, diagnosis, medication mgmt, reviewed risks vs benefits of meds, indication, and potential adverse effects. Shared decision to continue at current dose in light of improvements. Annual Health assessment completed.\par \par Name of PCP: Dr. Kimberley Gates\par Date of Last Physical Exam: 2019 (reminded to scheduled annual exam.)\par Date of Last Annual Labs: Cannot recall\par Annual Review of Systems Completed (Y/N): Y\par Tobacco Screening Completed (Y/N): Y (non-smoker)\par \par

## 2023-04-06 NOTE — PLAN
[Medication education provided] : Medication education provided. [Rationale for medication choices, possible risks/precautions, benefits, alternative treatment choices, and consequences of non-treatment discussed] : Rationale for medication choices, possible risks/precautions, benefits, alternative treatment choices, and consequences of non-treatment discussed with patient/family/caregiver  [Order(s) for medication placed in [facility] EHR] : Medication [FreeTextEntry4] : Recently started meds to address sxs of anxiety, depression, binge eating. [FreeTextEntry5] : PLAN: #) Continue trial of fluoxetine 20mg PO QD #) Continue adderall 15mg PO QD prescribed by juliana #) Continue I therapy #) F/U in 2 weeks

## 2023-04-06 NOTE — HISTORY OF PRESENT ILLNESS
[FreeTextEntry1] : Pt is seen and evaluated. Interval hx reviewed. Pt started fluoxetine 20mg, 5 weeks ago. He reports feeling improvement in the past week. He reports decreased binge eating - approx. 1x per week. He reports improved mood, less pacing, less binging, waking up with more energy, finding it easier to start the day, having more motivation,getting back into exercising. He reports less rumination and negative thinking. He reports improved sleep and no longer feels tired in the morning. He continues to reports some social anxiety. He denies any recent panic attacks.\par \par Note: has been dx with adhd inattentive type, IZZY, and MDD with neuropsych testing in 2018. Prescribed adderall 15mg by pediatrician, Dr. Gloria [phone]. (Takes adderall in afternoon to help with homework.) Pt has been in I therapy with Linda Connell for the past 5 years.

## 2023-04-06 NOTE — SOCIAL HISTORY
[With Family] : lives with family [Unemployed] : unemployed [Never ] : never  [High School] : high school [None] : none [Yes] : yes [FreeTextEntry1] : Living with Mother and Father in separate homes.  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

## 2023-04-06 NOTE — PAST MEDICAL HISTORY
[FreeTextEntry1] : Patient has been in psychotherapy treatment since July 2018 with Linda Connell at the psychological center at [facility].  Patient terminated treatment there in May 2021.  Patient has also completed a neuropsychological testing in March 2018.  Patient was diagnosed with IZZY, ADHD, inattentive type, moderate, and MDD, recurring, moderate. Patient denies past psychiatric hospitalization.  Patient was prescribed Adderall 15 mg from pediatrician in August 2020. \par

## 2023-04-06 NOTE — DISCUSSION/SUMMARY
[FreeTextEntry1] : Patient is a 23yo  (black/), cis gender, heterosexual male domiciled with his father, rising sophomore at [employer] , with no significant PMHx, and PPHx significant for ADHD, IZZY, MDD recurrent, currently prescribed Adderall 15mg PO TID by pediatrician, in psychotherapy with Linda Connell for many years. Patient does not have a hx of psych hospitalization, suicide attempts, or psych ER visits. Pt. is the only child of  parents. Stressors include interpersonal distress related to relationship with parents and GF. Patient endorsed inattentive sxs of ADHD, IZZY, and depression and presented for medication consultation in the context of worsening mood and increased anxiety in Jan 2023 - started on Fluoxetine with good tolerance and response. \par \par Time Spent: Reviewing chart, developing rapport, psychoeducation, diagnosis, medication mgmt, reviewed risks vs benefits of meds, indication, and potential adverse effects. Shared decision to continue at current dose in light of improvements. Annual Health assessment completed.\par \par Name of PCP: Dr. Kimberley Gates\par Date of Last Physical Exam: 2019 (reminded to scheduled annual exam.)\par Date of Last Annual Labs: Cannot recall\par Annual Review of Systems Completed (Y/N): Y\par Tobacco Screening Completed (Y/N): Y (non-smoker)\par \par

## 2023-04-06 NOTE — PLAN
[Medication education provided] : Medication education provided. [Rationale for medication choices, possible risks/precautions, benefits, alternative treatment choices, and consequences of non-treatment discussed] : Rationale for medication choices, possible risks/precautions, benefits, alternative treatment choices, and consequences of non-treatment discussed with patient/family/caregiver  [Order(s) for medication placed in [facility] EHR] : Medication [FreeTextEntry4] : Recently started meds to address sxs of anxiety, depression, binge eating. [FreeTextEntry5] : PLAN: #) Continue trial of fluoxetine 20mg PO QD #) Continue adderall 15mg PO QD prescribed by juliana #) Continue I therapy #) F/U in 2 weeks

## 2023-04-06 NOTE — HISTORY OF PRESENT ILLNESS
[FreeTextEntry1] : Pt is seen and evaluated. Interval hx reviewed. Pt started fluoxetine 20mg, 5 weeks ago. He reports feeling improvement in the past week. He reports decreased binge eating - approx. 1x per week. He reports improved mood, less pacing, less binging, waking up with more energy, finding it easier to start the day, having more motivation,getting back into exercising. He reports less rumination and negative thinking. He reports improved sleep and no longer feels tired in the morning. He continues to reports some social anxiety. He denies any recent panic attacks.\par \par Note: has been dx with adhd inattentive type, IZZY, and MDD with neuropsych testing in 2018. Prescribed adderall 15mg by pediatrician, Dr. Gloria [phone]. (Takes adderall in afternoon to help with homework.) Pt has been in I therapy with Linda Connell for the past 5 years.

## 2023-04-06 NOTE — SOCIAL HISTORY
[With Family] : lives with family [Unemployed] : unemployed [Never ] : never  [High School] : high school [None] : none [Yes] : yes [FreeTextEntry1] : Living with Mother and Father in separate homes.  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

## 2023-04-06 NOTE — PAST MEDICAL HISTORY
[FreeTextEntry1] : Patient has been in psychotherapy treatment since July 2018 with Linda Connell at the psychological center at [facility].  Patient terminated treatment there in May 2021.  Patient has also completed a neuropsychological testing in March 2018.  Patient was diagnosed with IZZY, ADHD, inattentive type, moderate, and MDD, recurring, moderate. Patient denies past psychiatric hospitalization.  Patient was prescribed Adderall 15 mg from pediatrician in August 2020. \par

## 2023-04-10 ENCOUNTER — APPOINTMENT (OUTPATIENT)
Dept: PSYCHIATRY | Facility: CLINIC | Age: 23
End: 2023-04-10

## 2023-04-13 ENCOUNTER — APPOINTMENT (OUTPATIENT)
Dept: PSYCHIATRY | Facility: CLINIC | Age: 23
End: 2023-04-13

## 2023-04-13 NOTE — PLAN
[Psychodynamic Therapy] : Psychodynamic Therapy  [Recommended Frequency of Visits: ____] : Recommended frequency of visits: [unfilled] [Return in ____ week(s)] : Return in [unfilled] week(s) [FreeTextEntry2] : 1. Increase behavioral skills to reduce symptoms of anxiety and depression\par 2.Begin to identify and name experiences that build on sense of self\par 3. Begin to identify underlying emotions that drive maladaptive behaviors.  [de-identified] : Mr. Joya arrived on time for session.  Mr. Joya discussed exploring and meeting new people as well as exploring aspects of sexual orientation and identity.  Mr. Joya expressed ambivalence related to meeting others and showing his full and authentic self to others. Mr. Joya ended session in good behavioral and emotional control.  [FreeTextEntry1] : Pt will continue with weekly psychotherapy treatment.   Continued psychopharm with Dr. Meyer.

## 2023-04-17 ENCOUNTER — APPOINTMENT (OUTPATIENT)
Dept: PSYCHIATRY | Facility: CLINIC | Age: 23
End: 2023-04-17

## 2023-04-17 NOTE — PLAN
[Psychodynamic Therapy] : Psychodynamic Therapy  [Recommended Frequency of Visits: ____] : Recommended frequency of visits: [unfilled] [Return in ____ week(s)] : Return in [unfilled] week(s) [FreeTextEntry2] : 1. Increase behavioral skills to reduce symptoms of anxiety and depression\par 2.Begin to identify and name experiences that build on sense of self\par 3. Begin to identify underlying emotions that drive maladaptive behaviors.  [de-identified] : Mr. Joya arrived on time for session.  Mr. Joya continued to discuss exploring exploring aspects of sexual orientation and identity as well as cultivating a sense of self while embracing his own desires and fantasies.  Mr. Joya expressed ambivalence related to meeting others and showing his full and authentic self to others. Mr. Joya ended session in good behavioral and emotional control.  [FreeTextEntry1] : Pt will continue with weekly psychotherapy treatment.   Continued psychopharm with Dr. Meyer.

## 2023-04-20 ENCOUNTER — APPOINTMENT (OUTPATIENT)
Dept: PSYCHIATRY | Facility: CLINIC | Age: 23
End: 2023-04-20

## 2023-04-24 ENCOUNTER — APPOINTMENT (OUTPATIENT)
Dept: PSYCHIATRY | Facility: CLINIC | Age: 23
End: 2023-04-24

## 2023-04-27 ENCOUNTER — APPOINTMENT (OUTPATIENT)
Dept: PSYCHIATRY | Facility: CLINIC | Age: 23
End: 2023-04-27

## 2023-04-27 NOTE — PLAN
[Psychodynamic Therapy] : Psychodynamic Therapy  [Recommended Frequency of Visits: ____] : Recommended frequency of visits: [unfilled] [Return in ____ week(s)] : Return in [unfilled] week(s) [FreeTextEntry2] : 1. Increase behavioral skills to reduce symptoms of anxiety and depression\par 2.Begin to identify and name experiences that build on sense of self\par 3. Begin to identify underlying emotions that drive maladaptive behaviors.  [de-identified] : Mr. Joya arrived on time for session.  Mr. Joya discussed finishing up his academic semester and managing anxiety related to finals and planning for his fall semester.  Writer continued to validate and support Mr. Joya in utilizing coping skills to effectively navigate increasing anxiety symptoms. Mr. Joya ended session in good behavioral and emotional control.  [FreeTextEntry1] : Pt will continue with weekly psychotherapy treatment.   Continued psychopharm with Dr. Meyer.

## 2023-05-01 ENCOUNTER — APPOINTMENT (OUTPATIENT)
Dept: PSYCHIATRY | Facility: CLINIC | Age: 23
End: 2023-05-01

## 2023-05-04 ENCOUNTER — APPOINTMENT (OUTPATIENT)
Dept: PSYCHIATRY | Facility: CLINIC | Age: 23
End: 2023-05-04

## 2023-05-04 ENCOUNTER — APPOINTMENT (OUTPATIENT)
Dept: PSYCHIATRY | Facility: CLINIC | Age: 23
End: 2023-05-04
Payer: COMMERCIAL

## 2023-05-04 VITALS — HEIGHT: 72 IN | BODY MASS INDEX: 23.84 KG/M2 | WEIGHT: 176 LBS

## 2023-05-04 PROCEDURE — 99214 OFFICE O/P EST MOD 30 MIN: CPT | Mod: 95

## 2023-05-04 RX ORDER — FLUOXETINE HYDROCHLORIDE 20 MG/1
20 CAPSULE ORAL
Qty: 30 | Refills: 0 | Status: DISCONTINUED | COMMUNITY
Start: 2023-01-26 | End: 2023-05-04

## 2023-05-04 NOTE — HISTORY OF PRESENT ILLNESS
[FreeTextEntry1] : Pt is seen and evaluated. Interval hx reviewed. He is completing finals and will return to  for the summer this weekend. He reports some difficulty initiating tasks and feeling a bit low and wishes to explore continued increase in medication dose. Pt started fluoxetine 20mg, over 3 months ago. He continues to feel improvement. He is only binging approx 1x per week. He reports improved interpersonal interactions. He reports improved mood, less pacing, less binging, waking up with more energy, finding it easier to start the day, having more motivation,getting back into exercising. He reports less rumination and negative thinking. He reports improved sleep and no longer feels tired in the morning. He continues to reports some social anxiety. He denies any recent panic attacks.\par \par Note: has been dx with adhd inattentive type, IZZY, and MDD with neuropsych testing in 2018. Prescribed Adderall 15mg by pediatrician, Dr. Gloria [phone]. (Takes adderall in afternoon to help with homework.) Pt has been in I therapy with Linda Connell for the past 5 years.

## 2023-05-04 NOTE — REASON FOR VISIT
[Patient preference] : as per patient preference [Continuing, patient not seen in-person within last 12 months (provide details below)] : Telehealth services are continuing, patient not seen in-person within last 12 months.  [Telehealth (audio & video) - Individual/Group] : This visit was provided via telehealth using real-time 2-way audio visual technology. [Other Location: e.g. Home (Enter Location, City,State)___] : The provider was located at [unfilled]. [Home] : The patient, [unfilled], was located at home, [unfilled], at the time of the visit. [Verbal consent obtained from patient/other participant(s)] : Verbal consent for telehealth/telephonic services obtained from patient/other participant(s) [FreeTextEntry4] : 10:00a [FreeTextEntry5] : 10:30a [Patient] : Patient [FreeTextEntry1] : Psychiatric follow up

## 2023-05-04 NOTE — PAST MEDICAL HISTORY
[FreeTextEntry1] : Patient has been in psychotherapy treatment since July 2018 with Linda Connell at the psychological center at [facility].  Patient terminated treatment there in May 2021.  Patient has also completed a neuropsychological testing in March 2018.  Patient was diagnosed with IZZY, ADHD, inattentive type, moderate, and MDD, recurring, moderate. Patient denies past psychiatric hospitalization.  Patient was prescribed Adderall 15 mg from pediatrician in August 2020. \par

## 2023-05-04 NOTE — SOCIAL HISTORY
[With Family] : lives with family [Unemployed] : unemployed [Never ] : never  [High School] : high school [None] : none [FreeTextEntry1] : Living with Mother and Father in separate homes.  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

## 2023-05-04 NOTE — DISCUSSION/SUMMARY
[FreeTextEntry1] : Patient is a 23yo  (black/), cis gender, heterosexual male domiciled with his father, rising sophomore at [employer], with no significant PMHx, and PPHx significant for ADHD, IZZY, MDD recurrent, currently prescribed Adderall 15mg PO TID by pediatrician, in psychotherapy with Linda Connell for many years. Patient does not have a hx of psych hospitalization, suicide attempts, or psych ER visits. Pt. is the only child of  parents. Stressors include interpersonal distress related to relationship with parents and GF. Patient endorsed inattentive sxs of ADHD, IZZY, and depression and presented for medication consultation in the context of worsening mood and increased anxiety in Jan 2023 - started on Fluoxetine with good tolerance and response. \par \par Time Spent: Reviewing chart, developing rapport, psychoeducation, diagnosis, medication mgmt, reviewed risks vs benefits of meds, indication, and potential adverse effects. Shared decision to increase dose of fluoxetine to increase benefits. \par \par Name of PCP: Dr. Kimberley Gates\par Date of Last Physical Exam: 2019 (reminded to scheduled annual exam.)\par Date of Last Annual Labs: Cannot recall\par Annual Review of Systems Completed (Y/N): Y\par Tobacco Screening Completed (Y/N): Y (non-smoker)\par \par

## 2023-05-04 NOTE — PLAN
[Medication education provided] : Medication education provided. [Rationale for medication choices, possible risks/precautions, benefits, alternative treatment choices, and consequences of non-treatment discussed] : Rationale for medication choices, possible risks/precautions, benefits, alternative treatment choices, and consequences of non-treatment discussed with patient/family/caregiver  [Order(s) for medication placed in [facility] EHR] : Medication [FreeTextEntry4] : Recently started meds to address sxs of anxiety, depression, binge eating. [FreeTextEntry5] : PLAN: #) Increase fluoxetine 20mg to 40mg PO QD #) Continue adderall 15mg PO QD prescribed by juliana #) Continue I therapy #) F/U in 2 weeks

## 2023-05-08 ENCOUNTER — APPOINTMENT (OUTPATIENT)
Dept: PSYCHIATRY | Facility: CLINIC | Age: 23
End: 2023-05-08

## 2023-05-09 NOTE — RISK ASSESSMENT
[Yes, patient reports ideation or behavior] : Yes, patient reports ideation or behavior [Yes] : Yes [No, patient denies ideation or behavior] : No, patient denies ideation or behavior [No] : No

## 2023-05-09 NOTE — REASON FOR VISIT
[Patient preference] : as per patient preference [Telehealth (audio & video) - Individual/Group] : This visit was provided via telehealth using real-time 2-way audio visual technology. [Other Location: e.g. Home (Enter Location, City,State)___] : The patient, [unfilled], was located at [unfilled] at the time of the visit. [Verbal consent obtained from patient/other participant(s)] : Verbal consent for telehealth/telephonic services obtained from patient/other participant(s) [Father] : father [Patient with collateral] : Patient with collateral  [FreeTextEntry4] : 4:30 [FreeTextEntry5] : 5:15 [FreeTextEntry1] : Pt arrived for weekly psychotherapy session.

## 2023-05-09 NOTE — PLAN
[Psychodynamic Therapy] : Psychodynamic Therapy  [Recommended Frequency of Visits: ____] : Recommended frequency of visits: [unfilled] [Return in ____ week(s)] : Return in [unfilled] week(s) [FreeTextEntry2] : 1. Increase behavioral skills to reduce symptoms of anxiety and depression\par 2.Begin to identify and name experiences that build on sense of self\par 3. Begin to identify underlying emotions that drive maladaptive behaviors.  [de-identified] : Mr. Joya arrived on time for session.  Mr. Joya discussed completing final exams last week and difficulty and anxiety around final grades.   has decided to remain at school for the summer while looking into a summer job.  Mr. Joya reported anxiety in telling his family members he will not return home for the summer.   Writer continued to validate and support Mr. Joya in utilizing coping skills to effectively navigate increasing anxiety symptoms. Mr. Joya ended session in good behavioral and emotional control.  [FreeTextEntry1] : Pt will continue with weekly psychotherapy treatment.   Continued psychopharm with Dr. Meyer.

## 2023-05-11 ENCOUNTER — APPOINTMENT (OUTPATIENT)
Dept: PSYCHIATRY | Facility: CLINIC | Age: 23
End: 2023-05-11

## 2023-05-15 ENCOUNTER — APPOINTMENT (OUTPATIENT)
Dept: PSYCHIATRY | Facility: CLINIC | Age: 23
End: 2023-05-15

## 2023-05-18 ENCOUNTER — APPOINTMENT (OUTPATIENT)
Dept: PSYCHIATRY | Facility: CLINIC | Age: 23
End: 2023-05-18

## 2023-05-19 NOTE — PLAN
[Psychodynamic Therapy] : Psychodynamic Therapy  [Recommended Frequency of Visits: ____] : Recommended frequency of visits: [unfilled] [Return in ____ week(s)] : Return in [unfilled] week(s) [FreeTextEntry2] : 1. Increase behavioral skills to reduce symptoms of anxiety and depression\par 2.Begin to identify and name experiences that build on sense of self\par 3. Begin to identify underlying emotions that drive maladaptive behaviors. \par \par Treatment plan review/revision due in August 2023. [de-identified] : Mr. Joya arrived on time for session.   Mr. 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  Mr. Joya reports continued anxiety related to family members disappointment/anger that he will not return home for the summer.   Writer continued to validate and support Mr. Joya in utilizing coping skills to effectively navigate increasing anxiety symptoms. Mr. Joya ended session in good behavioral and emotional control.  [FreeTextEntry1] : Pt will continue with weekly psychotherapy treatment.   Continued psychopharm with Dr. Meyer.

## 2023-05-22 ENCOUNTER — APPOINTMENT (OUTPATIENT)
Dept: PSYCHIATRY | Facility: CLINIC | Age: 23
End: 2023-05-22

## 2023-05-24 NOTE — PLAN
[Psychodynamic Therapy] : Psychodynamic Therapy  [Recommended Frequency of Visits: ____] : Recommended frequency of visits: [unfilled] [Return in ____ week(s)] : Return in [unfilled] week(s) [FreeTextEntry2] : 1. Increase behavioral skills to reduce symptoms of anxiety and depression\par 2.Begin to identify and name experiences that build on sense of self\par 3. Begin to identify underlying emotions that drive maladaptive behaviors. \par \par Treatment plan review/revision due in August 2023. [de-identified] : Mr. Joya arrived on time for session.   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  Mr. Joya reports managing symptoms of anxiety as well as utilizing his break to learn strategies and coping skills while preparing for the fall semester.   Writer continued to validate and support Mr. Joya in utilizing coping skills to effectively navigate increasing anxiety symptoms. Mr. Joya ended session in good behavioral and emotional control.  [FreeTextEntry1] : Pt will continue with weekly psychotherapy treatment.   Continued psychopharm with Dr. Meyer.

## 2023-05-25 ENCOUNTER — APPOINTMENT (OUTPATIENT)
Dept: PSYCHIATRY | Facility: CLINIC | Age: 23
End: 2023-05-25

## 2023-06-01 ENCOUNTER — APPOINTMENT (OUTPATIENT)
Dept: PSYCHIATRY | Facility: CLINIC | Age: 23
End: 2023-06-01

## 2023-06-01 ENCOUNTER — OUTPATIENT (OUTPATIENT)
Dept: OUTPATIENT SERVICES | Facility: HOSPITAL | Age: 23
LOS: 1 days | Discharge: HOME | End: 2023-06-01

## 2023-06-02 ENCOUNTER — APPOINTMENT (OUTPATIENT)
Dept: PSYCHIATRY | Facility: CLINIC | Age: 23
End: 2023-06-02
Payer: COMMERCIAL

## 2023-06-02 PROCEDURE — 99214 OFFICE O/P EST MOD 30 MIN: CPT | Mod: 95

## 2023-06-05 ENCOUNTER — APPOINTMENT (OUTPATIENT)
Dept: PSYCHIATRY | Facility: CLINIC | Age: 23
End: 2023-06-05

## 2023-06-05 NOTE — PLAN
[Medication education provided] : Medication education provided. [Rationale for medication choices, possible risks/precautions, benefits, alternative treatment choices, and consequences of non-treatment discussed] : Rationale for medication choices, possible risks/precautions, benefits, alternative treatment choices, and consequences of non-treatment discussed with patient/family/caregiver  [Order(s) for medication placed in [facility] EHR] : Medication [FreeTextEntry4] : Recently started meds to address sxs of anxiety, depression, binge eating. [FreeTextEntry5] : PLAN: #) Continue fluoxetine 40mg PO QDaily  #) Continue adderall 15mg PO QDaily prescribed by juliana #) Continue I therapy #) F/U in one month

## 2023-06-05 NOTE — HISTORY OF PRESENT ILLNESS
[FreeTextEntry1] : Pt is seen and evaluated. Interval hx reviewed. At last visit fluoxetine was increased from 20mg to 40mg PO Qdaily. Pt reports increased energy and motivation and improved mood. He decided to say in [place] and work for the summer. He is enjoying spending time with his friends. He continues to engage in I therapy. He reports improved sleep and less binging. He continues to have some social anxiety but notes overall improvement.  \par \par Note: has been dx with adhd inattentive type, IZZY, and MDD with neuropsych testing in 2018. Prescribed Adderall 15mg by pediatrician, Dr. Gloria [phone]. (Takes adderall in afternoon to help with homework.) Pt has been in I therapy with Linda Connell for the past 5 years.

## 2023-06-05 NOTE — PAST MEDICAL HISTORY
[FreeTextEntry1] : Patient has been in psychotherapy treatment since July 2018 with Linda Connell at the psychological center at [facility].  Patient terminated treatment there in May 2021.  Patient has also completed a neuropsychological testing in March 2018.  Patient was diagnosed with ZIZY, ADHD, inattentive type, moderate, and MDD, recurring, moderate. Patient denies past psychiatric hospitalization.  Patient was prescribed Adderall 15 mg from pediatrician in August 2020. \par

## 2023-06-05 NOTE — SOCIAL HISTORY
[With Family] : lives with family [Unemployed] : unemployed [Never ] : never  [High School] : high school [None] : none [Yes] : yes [FreeTextEntry1] : Living with Mother and Father in separate homes.  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

## 2023-06-05 NOTE — DISCUSSION/SUMMARY
[FreeTextEntry1] : Patient is a 23yo  (black/), cis gender, heterosexual male domiciled with his father, rising sophomore at [employer], with no significant PMHx, and PPHx significant for ADHD, IZZY, MDD recurrent, currently prescribed Adderall 15mg PO TID by pediatrician, in psychotherapy with Linda Connell for many years. Patient does not have a hx of psych hospitalization, suicide attempts, or psych ER visits. Pt. is the only child of  parents. Stressors include interpersonal distress related to relationship with parents and GF. Patient endorsed inattentive sxs of ADHD, IZZY, and depression and presented for medication consultation in the context of worsening mood and increased anxiety in Jan 2023 - started on Fluoxetine with good tolerance and response. \par \par Time Spent: Reviewing chart, developing rapport, psychoeducation, diagnosis, medication mgmt, reviewed risks vs benefits of meds, indication, and potential adverse effects. Shared decision to continue dose of fluoxetine given tolerance and response. \par \par Name of PCP: Dr. Kimberley Gates\par Date of Last Physical Exam: 2019 (reminded to scheduled annual exam.)\par Date of Last Annual Labs: Cannot recall\par Annual Review of Systems Completed (Y/N): Y\par Tobacco Screening Completed (Y/N): Y (non-smoker)\par \par

## 2023-06-06 NOTE — PLAN
[Psychodynamic Therapy] : Psychodynamic Therapy  [Recommended Frequency of Visits: ____] : Recommended frequency of visits: [unfilled] [Return in ____ week(s)] : Return in [unfilled] week(s) [FreeTextEntry2] : 1. Increase behavioral skills to reduce symptoms of anxiety and depression\par 2.Begin to identify and name experiences that build on sense of self\par 3. Begin to identify underlying emotions that drive maladaptive behaviors. \par \par Treatment plan review/revision due in August 2023. [de-identified] : Mr. Joya arrived on time for session.   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  Mr. Joya reports improved symptoms of anxiety following medication dosage increase.   Writer continued to validate and support Mr. Joya in utilizing coping skills to effectively navigate increasing anxiety symptoms. Mr. Joya ended session in good behavioral and emotional control. \par \par  [FreeTextEntry1] : Pt will continue with weekly psychotherapy treatment.   Continued psychopharm with Dr. Meyer.

## 2023-06-08 ENCOUNTER — APPOINTMENT (OUTPATIENT)
Dept: PSYCHIATRY | Facility: CLINIC | Age: 23
End: 2023-06-08

## 2023-06-12 ENCOUNTER — APPOINTMENT (OUTPATIENT)
Dept: PSYCHIATRY | Facility: CLINIC | Age: 23
End: 2023-06-12

## 2023-06-13 NOTE — PLAN
[Psychodynamic Therapy] : Psychodynamic Therapy  [Recommended Frequency of Visits: ____] : Recommended frequency of visits: [unfilled] [Return in ____ week(s)] : Return in [unfilled] week(s) [FreeTextEntry2] : 1. Increase behavioral skills to reduce symptoms of anxiety and depression\par 2.Begin to identify and name experiences that build on sense of self\par 3. Begin to identify underlying emotions that drive maladaptive behaviors. \par \par Treatment plan review/revision due in August 2023. [de-identified] : Mr. Joya arrived on time for session.   Mr. Joya discussed a recent incident/conflict with friends the night before as well as brief feelings of panic following.  Mr. joya reported effective use of coping through the situation while utilizing strategies (i.e. calling a friend) to soothe and regulate in the moment.  Mr. Joya continues to report improved symptoms of anxiety following medication dosage increase.   Writer continued to validate and support Mr. Joya in utilizing coping skills to effectively navigate increasing anxiety symptoms. Mr. Joya ended session in good behavioral and emotional control. \par \par  [FreeTextEntry1] : Pt will continue with weekly psychotherapy treatment.   Continued psychopharm with Dr. Meyer.

## 2023-06-15 ENCOUNTER — APPOINTMENT (OUTPATIENT)
Dept: PSYCHIATRY | Facility: CLINIC | Age: 23
End: 2023-06-15

## 2023-06-19 ENCOUNTER — APPOINTMENT (OUTPATIENT)
Dept: PSYCHIATRY | Facility: CLINIC | Age: 23
End: 2023-06-19

## 2023-06-22 ENCOUNTER — APPOINTMENT (OUTPATIENT)
Dept: PSYCHIATRY | Facility: CLINIC | Age: 23
End: 2023-06-22

## 2023-06-26 ENCOUNTER — APPOINTMENT (OUTPATIENT)
Dept: PSYCHIATRY | Facility: CLINIC | Age: 23
End: 2023-06-26

## 2023-06-28 ENCOUNTER — APPOINTMENT (OUTPATIENT)
Dept: PSYCHIATRY | Facility: CLINIC | Age: 23
End: 2023-06-28
Payer: COMMERCIAL

## 2023-06-28 PROCEDURE — 99214 OFFICE O/P EST MOD 30 MIN: CPT | Mod: 95

## 2023-06-28 NOTE — HISTORY OF PRESENT ILLNESS
[FreeTextEntry1] : Pt is seen and evaluated. Interval hx reviewed. Pt continues to note improvement since dose increase of fluoxetine. He is now taking the medication in the morning which has resulted in increased energy. He reports diminished binging. He continues to have some social anxiety. He is currently visiting with family in [facility]. He continues to engage in I therapy. \par \par Note: has been dx with adhd inattentive type, IZZY, and MDD with neuropsych testing in 2018. Prescribed Adderall 15mg by pediatrician, Dr. Gloria [phone]. (Takes adderall in afternoon to help with homework.) Pt has been in I therapy with Linda Connell for the past 5 years.

## 2023-06-28 NOTE — PLAN
[Medication education provided] : Medication education provided. [Rationale for medication choices, possible risks/precautions, benefits, alternative treatment choices, and consequences of non-treatment discussed] : Rationale for medication choices, possible risks/precautions, benefits, alternative treatment choices, and consequences of non-treatment discussed with patient/family/caregiver  [Order(s) for medication placed in [facility] EHR] : Medication [FreeTextEntry4] : Recently started meds to address sxs of anxiety, depression, binge eating. [FreeTextEntry5] : PLAN: #) Continue fluoxetine 40mg PO QDaily  #) Continue adderall 15mg PO QDaily prescribed by juliana #) Continue I therapy #) F/U in one month

## 2023-06-28 NOTE — PAST MEDICAL HISTORY
[FreeTextEntry1] : Patient has been in psychotherapy treatment since July 2018 with Linda Connell at the psychological center at [facility].  Patient terminated treatment there in May 2021.  Patient has also completed a neuropsychological testing in March 2018.  Patient was diagnosed with IZZY, ADHD, inattentive type, moderate, and MDD, recurring, moderate. Patient denies past psychiatric hospitalization.  Patient was prescribed Adderall 15 mg from pediatrician in August 2020. \par

## 2023-06-28 NOTE — DISCUSSION/SUMMARY
[FreeTextEntry1] : Patient is a 21yo  (black/), cis gender, heterosexual male domiciled with his father, rising sophomore at [employer], with no significant PMHx, and PPHx significant for ADHD, IZZY, MDD recurrent, currently prescribed Adderall 15mg PO TID by pediatrician, in psychotherapy with Linda Connell for many years. Patient does not have a hx of psych hospitalization, suicide attempts, or psych ER visits. Pt. is the only child of  parents. Stressors include interpersonal distress related to relationship with parents and GF. Patient endorsed inattentive sxs of ADHD, IZZY, and depression and presented for medication consultation in the context of worsening mood and increased anxiety in Jan 2023 - started on Fluoxetine with good tolerance and response. \par \par Time Spent: Reviewing chart, developing rapport, psychoeducation, diagnosis, medication mgmt, reviewed risks vs benefits of meds, indication, and potential adverse effects. Shared decision to continue dose of fluoxetine given tolerance and response. \par \par Name of PCP: Dr. Kimberley Gates\par Date of Last Physical Exam: 2019 (reminded to scheduled annual exam.)\par Date of Last Annual Labs: Cannot recall\par Annual Review of Systems Completed (Y/N): Y\par Tobacco Screening Completed (Y/N): Y (non-smoker)\par \par

## 2023-06-28 NOTE — SOCIAL HISTORY
[With Family] : lives with family [Unemployed] : unemployed [Never ] : never  [High School] : high school [None] : none [Yes] : yes [FreeTextEntry1] : Living with Mother and Father in separate homes.  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

## 2023-06-29 ENCOUNTER — APPOINTMENT (OUTPATIENT)
Dept: PSYCHIATRY | Facility: CLINIC | Age: 23
End: 2023-06-29

## 2023-06-29 NOTE — PLAN
[Psychodynamic Therapy] : Psychodynamic Therapy  [Recommended Frequency of Visits: ____] : Recommended frequency of visits: [unfilled] [Return in ____ week(s)] : Return in [unfilled] week(s) [FreeTextEntry2] : 1. Increase behavioral skills to reduce symptoms of anxiety and depression\par 2.Begin to identify and name experiences that build on sense of self\par 3. Begin to identify underlying emotions that drive maladaptive behaviors. \par \par Treatment plan review/revision due in August 2023. [de-identified] : Mr. Joya arrived on time for session.   Mr. Joya discussed returning to visit family in the city.  Mr. Joya reported identifying/acknowledging his own feelings related to managing family expectations.  Mr. Joya continues to report improved symptoms of anxiety following medication dosage increase.   Writer continued to validate and support Mr. Joya in utilizing coping skills to effectively navigate increasing anxiety symptoms. Mr. Joya ended session in good behavioral and emotional control. \par \par  [FreeTextEntry1] : Pt will continue with weekly psychotherapy treatment.   Continued psychopharm with Dr. Meyer.

## 2023-07-06 ENCOUNTER — APPOINTMENT (OUTPATIENT)
Dept: PSYCHIATRY | Facility: CLINIC | Age: 23
End: 2023-07-06

## 2023-07-10 ENCOUNTER — APPOINTMENT (OUTPATIENT)
Dept: PSYCHIATRY | Facility: CLINIC | Age: 23
End: 2023-07-10

## 2023-07-10 DIAGNOSIS — F60.0 PARANOID PERSONALITY DISORDER: ICD-10-CM

## 2023-07-13 ENCOUNTER — APPOINTMENT (OUTPATIENT)
Dept: PSYCHIATRY | Facility: CLINIC | Age: 23
End: 2023-07-13

## 2023-07-13 NOTE — PLAN
[Psychodynamic Therapy] : Psychodynamic Therapy  [Recommended Frequency of Visits: ____] : Recommended frequency of visits: [unfilled] [Return in ____ week(s)] : Return in [unfilled] week(s) [FreeTextEntry2] : 1. Increase behavioral skills to reduce symptoms of anxiety and depression\par 2.Begin to identify and name experiences that build on sense of self\par 3. Begin to identify underlying emotions that drive maladaptive behaviors. \par \par Treatment plan review/revision due in August 2023. [de-identified] : Mr. Joya arrived on time for session.   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   Mr. Joya continues to report improved symptoms of anxiety following medication dosage increase.   Writer continued to validate and support Mr. Joya in utilizing coping skills to effectively navigate increasing anxiety symptoms. Mr. Joya ended session in good behavioral and emotional control. \par \par  [FreeTextEntry1] : Pt will continue with weekly psychotherapy treatment.   Continued psychopharm with Dr. Meyer.

## 2023-07-20 ENCOUNTER — APPOINTMENT (OUTPATIENT)
Dept: PSYCHIATRY | Facility: CLINIC | Age: 23
End: 2023-07-20

## 2023-07-24 ENCOUNTER — APPOINTMENT (OUTPATIENT)
Dept: PSYCHIATRY | Facility: CLINIC | Age: 23
End: 2023-07-24
Payer: COMMERCIAL

## 2023-07-24 PROCEDURE — ZZZZZ: CPT

## 2023-07-24 PROCEDURE — 99214 OFFICE O/P EST MOD 30 MIN: CPT | Mod: 95

## 2023-07-24 NOTE — PAST MEDICAL HISTORY
[FreeTextEntry1] : Patient has been in psychotherapy treatment since July 2018 with Linda Connell at the psychological center at [facility].  Patient terminated treatment there in May 2021.  Patient has also completed a neuropsychological testing in March 2018.  Patient was diagnosed with IZZY, ADHD, inattentive type, moderate, and MDD, recurring, moderate. Patient denies past psychiatric hospitalization.  Patient was prescribed Adderall 15 mg from pediatrician in August 2020. \par

## 2023-07-24 NOTE — REASON FOR VISIT
[Patient preference] : as per patient preference [Continuing, patient not seen in-person within last 12 months (provide details below)] : Telehealth services are continuing, patient not seen in-person within last 12 months.  [Telehealth (audio & video) - Individual/Group] : This visit was provided via telehealth using real-time 2-way audio visual technology. [Other Location: e.g. Home (Enter Location, City,State)___] : The provider was located at [unfilled]. [Home] : The patient, [unfilled], was located at home, [unfilled], at the time of the visit. [Verbal consent obtained from patient/other participant(s)] : Verbal consent for telehealth/telephonic services obtained from patient/other participant(s) [FreeTextEntry4] : 2:30p [FreeTextEntry5] : 3:00p [Patient] : Patient [FreeTextEntry1] : Psychiatric follow up

## 2023-07-24 NOTE — PLAN
[Medication education provided] : Medication education provided. [Rationale for medication choices, possible risks/precautions, benefits, alternative treatment choices, and consequences of non-treatment discussed] : Rationale for medication choices, possible risks/precautions, benefits, alternative treatment choices, and consequences of non-treatment discussed with patient/family/caregiver  [Order(s) for medication placed in [facility] EHR] : Medication [FreeTextEntry4] : Recently started meds to address sxs of anxiety, depression, binge eating. [FreeTextEntry5] : PLAN: #) Continue fluoxetine 40mg PO QDaily  #) Continue adderall 15mg PO QDaily prescribed by juliana #) Continue I therapy #) F/U in one month no

## 2023-07-24 NOTE — HISTORY OF PRESENT ILLNESS
[FreeTextEntry1] : Pt is seen and evaluated. Interval hx reviewed. Pt has been visiting with family and recently attended a family reunion. Pt continues to note improvement since dose increase of fluoxetine. He is now taking the medication in the morning which has resulted in increased energy. He reports diminished binging. He continues to have some social anxiety. He is currently visiting with family in [facility]. He continues to engage in I therapy. \par \par Note: has been dx with adhd inattentive type, IZZY, and MDD with neuropsych testing in 2018. Prescribed Adderall 15mg by pediatrician, Dr. Gloria [phone]. (Takes adderall in afternoon to help with homework.) Pt has been in I therapy with Linda Connell for the past 5 years.

## 2023-07-24 NOTE — SOCIAL HISTORY
[With Family] : lives with family [Unemployed] : unemployed [Never ] : never  [High School] : high school [None] : none [FreeTextEntry1] : Living with Mother and Father in separate homes.  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

## 2023-07-24 NOTE — DISCUSSION/SUMMARY
[FreeTextEntry1] : Patient is a 23yo  (black/), cis gender, heterosexual male domiciled with his father, rising sophomore at [employer], with no significant PMHx, and PPHx significant for ADHD, IZZY, MDD recurrent, currently prescribed Adderall 15mg PO TID by pediatrician, in psychotherapy with Linda Connell for many years. Patient does not have a hx of psych hospitalization, suicide attempts, or psych ER visits. Pt. is the only child of  parents. Stressors include interpersonal distress related to relationship with parents and GF. Patient endorsed inattentive sxs of ADHD, IZZY, and depression and presented for medication consultation in the context of worsening mood and increased anxiety in Jan 2023 - started on Fluoxetine with good tolerance and response. \par \par Time Spent: Reviewing chart, developing rapport, psychoeducation, diagnosis, medication mgmt, reviewed risks vs benefits of meds, indication, and potential adverse effects. Shared decision to continue dose of fluoxetine given tolerance and response. \par \par Name of PCP: Dr. Kimberley Gates\par Date of Last Physical Exam: 2019 (reminded to scheduled annual exam.)\par Date of Last Annual Labs: Cannot recall\par Annual Review of Systems Completed (Y/N): Y\par Tobacco Screening Completed (Y/N): Y (non-smoker)\par \par

## 2023-08-04 NOTE — END OF VISIT
[Time Spent: ___ minutes] : I have spent [unfilled] minutes of time on the encounter. [Duration of Psychotherapy Visit (minutes spent in synchronous communication): ____] : Duration of Psychotherapy Visit (minutes spent in synchronous communication): [unfilled] [Individual Psychotherapy for 38-52 minutes] : Individual Psychotherapy for 38 - 52 minutes

## 2023-08-04 NOTE — PHYSICAL EXAM
[Average] : average [Cooperative] : cooperative [Euthymic] : euthymic [Depressed] : depressed [Full] : full [Clear] : clear [Linear/Goal Directed] : linear/goal directed [None] : none [None Reported] : none reported [WNL] : within normal limits [Not applicable] : not applicable

## 2023-08-04 NOTE — PAST MEDICAL HISTORY
[FreeTextEntry1] : Patient has been in psychotherapy treatment since July 2018 with Linda Connell at the psychological center at [facility].  Patient terminated treatment there in May 2021.  Patient has also completed a neuropsychological testing in March 2018.  Patient was diagnosed with IZZY, ADHD, inattentive type, moderate, and MDD, recurring, moderate. Patient denies past psychiatric hospitalization.  Patient was prescribed Adderall 15 mg from pediatrician in August 2020. \par

## 2023-08-04 NOTE — PLAN
[FreeTextEntry2] : 1. Increase behavioral skills to reduce symptoms of anxiety and depression\par  2.Begin to identify and name experiences that build on sense of self\par  3. Begin to identify underlying emotions that drive maladaptive behaviors. \par  \par  Treatment plan review/revision due in August 2023. [Psychodynamic Therapy] : Psychodynamic Therapy  [de-identified] : Mr. Joya arrived on time for session.   Mr. Joya discussed new job and balancing upcoming semester and responsibilities.    Mr. Joya continues to report improved symptoms of anxiety following medication dosage increase.   Writer continued to validate and support Mr. Joya in utilizing coping skills to effectively navigate increasing anxiety symptoms. Mr. Joya ended session in good behavioral and emotional control.    [Recommended Frequency of Visits: ____] : Recommended frequency of visits: [unfilled] [Return in ____ week(s)] : Return in [unfilled] week(s) [FreeTextEntry1] : Pt will continue with weekly psychotherapy treatment.   Continued psychopharm with Dr. Meyer.  [Medication education provided] : Medication education provided. [Rationale for medication choices, possible risks/precautions, benefits, alternative treatment choices, and consequences of non-treatment discussed] : Rationale for medication choices, possible risks/precautions, benefits, alternative treatment choices, and consequences of non-treatment discussed with patient/family/caregiver  [Order(s) for medication placed in [facility] EHR] : Medication [FreeTextEntry4] : Recently started meds to address sxs of anxiety, depression, binge eating. [FreeTextEntry5] : PLAN: #) Continue fluoxetine 40mg PO QDaily  #) Continue adderall 15mg PO QDaily prescribed by juliana #) Continue I therapy #) F/U in one month

## 2023-08-04 NOTE — RISK ASSESSMENT
[Yes, patient reports ideation or behavior] : Yes, patient reports ideation or behavior [No, patient denies ideation or behavior] : No, patient denies ideation or behavior [Yes] : Yes [No] : No [FreeTextEntry9] : Low acute risk. Passive SI with no plan or intent. No hx of attempts. Protective factors include family support, future orientation and connection to care.

## 2023-08-04 NOTE — REASON FOR VISIT
[Patient preference] : as per patient preference [Continuing, patient not seen in-person within last 12 months (provide details below)] : Telehealth services are continuing, patient not seen in-person within last 12 months.  [Telehealth (audio & video) - Individual/Group] : This visit was provided via telehealth using real-time 2-way audio visual technology. [Other Location: e.g. Home (Enter Location, City,State)___] : The patient, [unfilled], was located at [unfilled] at the time of the visit. [Verbal consent obtained from patient/other participant(s)] : Verbal consent for telehealth/telephonic services obtained from patient/other participant(s) [FreeTextEntry4] : 4:30 [FreeTextEntry5] : 5:15 [Patient with collateral] : Patient with collateral  [Father] : father [FreeTextEntry1] : Pt arrived for weekly psychotherapy session.

## 2023-08-04 NOTE — HISTORY OF PRESENT ILLNESS
[FreeTextEntry1] : Pt is seen and evaluated. Interval hx reviewed. Pt continues to note improvement since dose increase of fluoxetine. He is now taking the medication in the morning which has resulted in increased energy. He reports diminished binging. He continues to have some social anxiety. He is currently visiting with family in [facility]. He continues to engage in I therapy. \par  \par  Note: has been dx with adhd inattentive type, IZZY, and MDD with neuropsych testing in 2018. Prescribed Adderall 15mg by pediatrician, Dr. Gloria [phone]. (Takes adderall in afternoon to help with homework.) Pt has been in I therapy with Linda Connell for the past 5 years.

## 2023-08-04 NOTE — FAMILY HISTORY
Josefina Tomlinson(Resident) [FreeTextEntry1] : Patient denies any family psychiatric history.  However, patient reports that both parents have struggled with substance use.

## 2023-08-04 NOTE — SOCIAL HISTORY
[With Family] : lives with family [Unemployed] : unemployed [Never ] : never  [High School] : high school [None] : none [FreeTextEntry1] : Living with Mother and Father in separate homes.  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

## 2023-08-04 NOTE — DISCUSSION/SUMMARY
[FreeTextEntry1] : Patient is a 21yo  (black/), cis gender, heterosexual male domiciled with his father, rising sophomore at [employer], with no significant PMHx, and PPHx significant for ADHD, IZZY, MDD recurrent, currently prescribed Adderall 15mg PO TID by pediatrician, in psychotherapy with Linda Connell for many years. Patient does not have a hx of psych hospitalization, suicide attempts, or psych ER visits. Pt. is the only child of  parents. Stressors include interpersonal distress related to relationship with parents and GF. Patient endorsed inattentive sxs of ADHD, IZZY, and depression and presented for medication consultation in the context of worsening mood and increased anxiety in Jan 2023 - started on Fluoxetine with good tolerance and response. \par  \par  Time Spent: Reviewing chart, developing rapport, psychoeducation, diagnosis, medication mgmt, reviewed risks vs benefits of meds, indication, and potential adverse effects. Shared decision to continue dose of fluoxetine given tolerance and response. \par  \par  Name of PCP: Dr. Kimberley Gates\par  Date of Last Physical Exam: 2019 (reminded to scheduled annual exam.)\par  Date of Last Annual Labs: Cannot recall\par  Annual Review of Systems Completed (Y/N): Y\par  Tobacco Screening Completed (Y/N): Y (non-smoker)\par  \par

## 2023-08-07 ENCOUNTER — APPOINTMENT (OUTPATIENT)
Dept: PSYCHIATRY | Facility: CLINIC | Age: 23
End: 2023-08-07
Payer: COMMERCIAL

## 2023-08-07 PROCEDURE — ZZZZZ: CPT

## 2023-08-09 NOTE — PAST MEDICAL HISTORY
[FreeTextEntry1] : Patient has been in psychotherapy treatment since July 2018 with Linda Connell at the psychological center at [facility].  Patient terminated treatment there in May 2021.  Patient has also completed a neuropsychological testing in March 2018.  Patient was diagnosed with IZZY, ADHD, inattentive type, moderate, and MDD, recurring, moderate. Patient denies past psychiatric hospitalization.  Patient was prescribed Adderall 15 mg from pediatrician in August 2020. \par

## 2023-08-09 NOTE — DISCUSSION/SUMMARY
[FreeTextEntry1] : Patient is a 21yo  (black/), cis gender, heterosexual male domiciled with his father, rising sophomore at [employer], with no significant PMHx, and PPHx significant for ADHD, IZZY, MDD recurrent, currently prescribed Adderall 15mg PO TID by pediatrician, in psychotherapy with Linda Connell for many years. Patient does not have a hx of psych hospitalization, suicide attempts, or psych ER visits. Pt. is the only child of  parents. Stressors include interpersonal distress related to relationship with parents and GF. Patient endorsed inattentive sxs of ADHD, IZZY, and depression and presented for medication consultation in the context of worsening mood and increased anxiety in Jan 2023 - started on Fluoxetine with good tolerance and response. \par  \par  Time Spent: Reviewing chart, developing rapport, psychoeducation, diagnosis, medication mgmt, reviewed risks vs benefits of meds, indication, and potential adverse effects. Shared decision to continue dose of fluoxetine given tolerance and response. \par  \par  Name of PCP: Dr. Kimberley Gates\par  Date of Last Physical Exam: 2019 (reminded to scheduled annual exam.)\par  Date of Last Annual Labs: Cannot recall\par  Annual Review of Systems Completed (Y/N): Y\par  Tobacco Screening Completed (Y/N): Y (non-smoker)\par  \par

## 2023-08-09 NOTE — HISTORY OF PRESENT ILLNESS
[FreeTextEntry1] : Pt is seen and evaluated. Interval hx reviewed. Pt continues to note improvement since dose increase of fluoxetine. He is now taking the medication in the morning which has resulted in increased energy. He reports diminished binging. He continues to have some social anxiety. He is currently visiting with family in [facility]. He continues to engage in I therapy. \par  \par  Note: has been dx with adhd inattentive type, IZZY, and MDD with neuropsych testing in 2018. Prescribed Adderall 15mg by pediatrician, Dr. Gloria [phone]. (Takes adderall in afternoon to help with homework.) Pt has been in I therapy with Linda Connell for the past 5 years.

## 2023-08-09 NOTE — PLAN
[FreeTextEntry2] : 1. Increase behavioral skills to reduce symptoms of anxiety and depression\par  2.Begin to identify and name experiences that build on sense of self\par  3. Begin to identify underlying emotions that drive maladaptive behaviors. \par  \par  Treatment plan review/revision due in August 2023. [Psychodynamic Therapy] : Psychodynamic Therapy  [de-identified] : Mr. Joya arrived on time for session.   Mr. Joya discussed upcoming termination with writer while looking forward to beginning transfer process and anxiety related upcoming fall academic semester.    Mr. Joya continues to report improved symptoms of anxiety following medication dosage increase. Writer continued to validate and support Mr. Joya in utilizing coping skills to effectively navigate increasing anxiety symptoms. Mr. Joya ended session in good behavioral and emotional control.    [Recommended Frequency of Visits: ____] : Recommended frequency of visits: [unfilled] [Return in ____ week(s)] : Return in [unfilled] week(s) [FreeTextEntry1] : Pt will continue with weekly psychotherapy treatment.   Continued psychopharm with Dr. Meyer.  [Medication education provided] : Medication education provided. [Rationale for medication choices, possible risks/precautions, benefits, alternative treatment choices, and consequences of non-treatment discussed] : Rationale for medication choices, possible risks/precautions, benefits, alternative treatment choices, and consequences of non-treatment discussed with patient/family/caregiver  [Order(s) for medication placed in [place] EHR] : Medication [FreeTextEntry4] : Recently started meds to address sxs of anxiety, depression, binge eating. [FreeTextEntry5] : PLAN: #) Continue fluoxetine 40mg PO QDaily  #) Continue adderall 15mg PO QDaily prescribed by juliana #) Continue I therapy #) F/U in one month

## 2023-08-21 ENCOUNTER — APPOINTMENT (OUTPATIENT)
Dept: PSYCHIATRY | Facility: CLINIC | Age: 23
End: 2023-08-21

## 2023-08-28 ENCOUNTER — NON-APPOINTMENT (OUTPATIENT)
Age: 23
End: 2023-08-28

## 2023-09-01 ENCOUNTER — APPOINTMENT (OUTPATIENT)
Dept: PSYCHIATRY | Facility: CLINIC | Age: 23
End: 2023-09-01

## 2023-09-01 NOTE — REASON FOR VISIT
[Access issues (e.g., transportation, impaired mobility, etc.)] : due to patient's access issues [Continuing, patient not seen in-person within last 12 months (provide details below)] : Telehealth services are continuing, patient not seen in-person within last 12 months.  [Telehealth (audio & video) - Individual/Group] : This visit was provided via telehealth using real-time 2-way audio visual technology. [Other Location: e.g. Home (Enter Location, City,State)___] : The provider was located at [unfilled]. [Home] : The patient, [unfilled], was located at home, [unfilled], at the time of the visit. [Verbal consent obtained from patient/other participant(s)] : Verbal consent for telehealth/telephonic services obtained from patient/other participant(s) [FreeTextEntry4] : 11:00 AM [FreeTextEntry5] : 11:45AM [FreeTextEntry3] : pt transfered to writer for ongoing telehealth psychotherapy.  [Patient] : Patient

## 2023-09-01 NOTE — PLAN
[FreeTextEntry2] : Pt will continue with weekly individual psychotherapy.  [Psychodynamic Therapy] : Psychodynamic Therapy  [de-identified] : Pt arrived on time to first session of individual psychotherapy with writer. Therapist reviewed confidentiality policies, worked on building rapport, and conducted first session intake process. Pt reported on his presenting concerns, treatment history, family background, and current psychosocial functioning. Therapist provided validation and elicited further details. Pt left session in good emotional and behavioral control.  [Recommended Frequency of Visits: ____] : Recommended frequency of visits: [unfilled] [Return in ____ week(s)] : Return in [unfilled] week(s) [FreeTextEntry1] : Pt will continue individual psychotherapy with writer, and attend next session in one week.

## 2023-09-06 ENCOUNTER — APPOINTMENT (OUTPATIENT)
Dept: PSYCHIATRY | Facility: CLINIC | Age: 23
End: 2023-09-06

## 2023-09-07 ENCOUNTER — APPOINTMENT (OUTPATIENT)
Dept: PSYCHIATRY | Facility: CLINIC | Age: 23
End: 2023-09-07
Payer: COMMERCIAL

## 2023-09-07 PROCEDURE — 99214 OFFICE O/P EST MOD 30 MIN: CPT | Mod: 95

## 2023-09-07 NOTE — DISCUSSION/SUMMARY
[FreeTextEntry1] : Patient is a 23yo  (black/), cis gender, heterosexual male domiciled with his father, rising sophomore at [employer], with no significant PMHx, and PPHx significant for ADHD, IZZY, MDD recurrent, currently prescribed Adderall 15mg PO TID by pediatrician, in psychotherapy with Linda Connell for many years. Patient does not have a hx of psych hospitalization, suicide attempts, or psych ER visits. Pt. is the only child of  parents. Stressors include interpersonal distress related to relationship with parents and GF. Patient endorsed inattentive sxs of ADHD, IZZY, and depression and presented for medication consultation in the context of worsening mood and increased anxiety in Jan 2023 - started on Fluoxetine with good tolerance and response.   Time Spent: Reviewing chart, developing rapport, psychoeducation, diagnosis, medication mgmt, reviewed risks vs benefits of meds, indication, and potential adverse effects. Shared decision to continue dose of fluoxetine given tolerance and response.   Name of PCP: Dr. Kimberley Gates Date of Last Physical Exam: 2019 (reminded to scheduled annual exam.) Date of Last Annual Labs: Cannot recall Annual Review of Systems Completed (Y/N): Y Tobacco Screening Completed (Y/N): Y (non-smoker)

## 2023-09-07 NOTE — SOCIAL HISTORY
[With Family] : lives with family [Unemployed] : unemployed [Never ] : never  [High School] : high school [None] : none [FreeTextEntry1] : Living with Mother and Father in separate homes.  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

## 2023-09-07 NOTE — REASON FOR VISIT
[Access issues (e.g., transportation, impaired mobility, etc.)] : due to patient's access issues [Continuing, patient not seen in-person within last 12 months (provide details below)] : Telehealth services are continuing, patient not seen in-person within last 12 months.  [Telehealth (audio & video) - Individual/Group] : This visit was provided via telehealth using real-time 2-way audio visual technology. [Other Location: e.g. Home (Enter Location, City,State)___] : The provider was located at [unfilled]. [Home] : The patient, [unfilled], was located at home, [unfilled], at the time of the visit. [Verbal consent obtained from patient/other participant(s)] : Verbal consent for telehealth/telephonic services obtained from patient/other participant(s) [FreeTextEntry4] : 4:00 PM [FreeTextEntry5] : 4:45 PM [FreeTextEntry3] : pt transfered to writer for ongoing telehealth psychotherapy.  [Patient] : Patient

## 2023-09-07 NOTE — PLAN
[FreeTextEntry2] : Pt will continue with weekly individual psychotherapy.  [Psychodynamic Therapy] : Psychodynamic Therapy  [de-identified] : Pt arrived on time to individual psychotherapy session. Therapist continued new patient intake process involving building rapport, and obtaining background and history on pt's presenting concerns and family background. Pt left session in good emotional and behavioral control.  [Recommended Frequency of Visits: ____] : Recommended frequency of visits: [unfilled] [Return in ____ week(s)] : Return in [unfilled] week(s) [FreeTextEntry1] : Pt will continue individual psychotherapy with writer, and attend next session in one week.

## 2023-09-07 NOTE — PLAN
[Medication education provided] : Medication education provided. [Rationale for medication choices, possible risks/precautions, benefits, alternative treatment choices, and consequences of non-treatment discussed] : Rationale for medication choices, possible risks/precautions, benefits, alternative treatment choices, and consequences of non-treatment discussed with patient/family/caregiver  [Order(s) for medication placed in [place] EHR] : Medication [FreeTextEntry4] : Recently started meds to address sxs of anxiety, depression, binge eating. [FreeTextEntry5] : PLAN: #) Continue fluoxetine 40mg PO QDaily  #) Continue adderall 15mg PO QDaily prescribed by juliana #) Continue I therapy #) F/U in one month

## 2023-09-07 NOTE — HISTORY OF PRESENT ILLNESS
[FreeTextEntry1] : Pt is seen and evaluated. Interval hx reviewed. 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 Anxiety and mood remain stable but he notes increased binge eating lately. He attributes this to convenience as he lives an hour walk from a grocery store and does not have a car. Ordering from [employer] is quite expensive. He is taking the 's exam tomorrow and if he passes his parents have agreed to help him get a car. He recently transitoned to a new therapist, Dora.   Note: has been dx with adhd inattentive type, IZZY, and MDD with neuropsych testing in 2018. Prescribed Adderall 15mg by pediatrician, Dr. Gloria [phone]. (Takes adderall in afternoon to help with homework.) Pt has been in I therapy with Linda Connell for the past 5 years.

## 2023-09-07 NOTE — PAST MEDICAL HISTORY
[FreeTextEntry1] : Patient has been in psychotherapy treatment since July 2018 with Linda Connell at the psychological center at [facility].  Patient terminated treatment there in May 2021.  Patient has also completed a neuropsychological testing in March 2018.  Patient was diagnosed with IZZY, ADHD, inattentive type, moderate, and MDD, recurring, moderate. Patient denies past psychiatric hospitalization.  Patient was prescribed Adderall 15 mg from pediatrician in August 2020. \par

## 2023-09-14 ENCOUNTER — APPOINTMENT (OUTPATIENT)
Dept: PSYCHIATRY | Facility: CLINIC | Age: 23
End: 2023-09-14

## 2023-09-21 ENCOUNTER — APPOINTMENT (OUTPATIENT)
Dept: PSYCHIATRY | Facility: CLINIC | Age: 23
End: 2023-09-21

## 2023-09-28 ENCOUNTER — APPOINTMENT (OUTPATIENT)
Dept: PSYCHIATRY | Facility: CLINIC | Age: 23
End: 2023-09-28

## 2023-10-05 ENCOUNTER — APPOINTMENT (OUTPATIENT)
Dept: PSYCHIATRY | Facility: CLINIC | Age: 23
End: 2023-10-05

## 2023-10-06 ENCOUNTER — APPOINTMENT (OUTPATIENT)
Dept: PSYCHIATRY | Facility: CLINIC | Age: 23
End: 2023-10-06
Payer: COMMERCIAL

## 2023-10-06 PROCEDURE — 99214 OFFICE O/P EST MOD 30 MIN: CPT | Mod: 95

## 2023-10-06 RX ORDER — FLUOXETINE HYDROCHLORIDE 40 MG/1
40 CAPSULE ORAL
Qty: 30 | Refills: 1 | Status: DISCONTINUED | COMMUNITY
Start: 2023-05-04 | End: 2023-10-06

## 2023-10-12 ENCOUNTER — APPOINTMENT (OUTPATIENT)
Dept: PSYCHIATRY | Facility: CLINIC | Age: 23
End: 2023-10-12

## 2023-10-19 ENCOUNTER — APPOINTMENT (OUTPATIENT)
Dept: PSYCHIATRY | Facility: CLINIC | Age: 23
End: 2023-10-19

## 2023-10-26 ENCOUNTER — APPOINTMENT (OUTPATIENT)
Dept: PSYCHIATRY | Facility: CLINIC | Age: 23
End: 2023-10-26

## 2023-11-01 ENCOUNTER — APPOINTMENT (OUTPATIENT)
Dept: PSYCHIATRY | Facility: CLINIC | Age: 23
End: 2023-11-01
Payer: COMMERCIAL

## 2023-11-01 PROCEDURE — 99214 OFFICE O/P EST MOD 30 MIN: CPT | Mod: 95

## 2023-11-02 ENCOUNTER — APPOINTMENT (OUTPATIENT)
Dept: PSYCHIATRY | Facility: CLINIC | Age: 23
End: 2023-11-02

## 2023-11-07 ENCOUNTER — APPOINTMENT (OUTPATIENT)
Dept: PSYCHIATRY | Facility: CLINIC | Age: 23
End: 2023-11-07

## 2023-11-16 ENCOUNTER — APPOINTMENT (OUTPATIENT)
Dept: PSYCHIATRY | Facility: CLINIC | Age: 23
End: 2023-11-16

## 2023-11-29 ENCOUNTER — APPOINTMENT (OUTPATIENT)
Dept: PSYCHIATRY | Facility: CLINIC | Age: 23
End: 2023-11-29
Payer: COMMERCIAL

## 2023-11-29 PROCEDURE — 99214 OFFICE O/P EST MOD 30 MIN: CPT | Mod: 95

## 2023-11-30 ENCOUNTER — APPOINTMENT (OUTPATIENT)
Dept: PSYCHIATRY | Facility: CLINIC | Age: 23
End: 2023-11-30

## 2023-12-01 ENCOUNTER — APPOINTMENT (OUTPATIENT)
Dept: PSYCHIATRY | Facility: CLINIC | Age: 23
End: 2023-12-01

## 2023-12-05 ENCOUNTER — APPOINTMENT (OUTPATIENT)
Dept: PSYCHIATRY | Facility: CLINIC | Age: 23
End: 2023-12-05

## 2023-12-07 ENCOUNTER — APPOINTMENT (OUTPATIENT)
Dept: PSYCHIATRY | Facility: CLINIC | Age: 23
End: 2023-12-07

## 2023-12-12 ENCOUNTER — APPOINTMENT (OUTPATIENT)
Dept: PSYCHIATRY | Facility: CLINIC | Age: 23
End: 2023-12-12

## 2023-12-13 NOTE — REASON FOR VISIT
[Access issues (e.g., transportation, impaired mobility, etc.)] : due to patient's access issues [Continuity of care] : to ensure continuity of care [Continuing, patient not seen in-person within last 12 months (provide details below)] : Telehealth services are continuing, patient not seen in-person within last 12 months.  [Telehealth (audio & video) - Individual/Group] : This visit was provided via telehealth using real-time 2-way audio visual technology. [Other Location: e.g. Home (Enter Location, City,State)___] : The provider was located at [unfilled]. [Home] : The patient, [unfilled], was located at home, [unfilled], at the time of the visit. [Verbal consent obtained from patient/other participant(s)] : Verbal consent for telehealth/telephonic services obtained from patient/other participant(s) [FreeTextEntry5] : 12:45 PM [FreeTextEntry4] : 12:00 PM [FreeTextEntry3] : In-person session not clinically indicated. Pt prefers telehealth due to access issues.  [Patient] : Patient

## 2023-12-13 NOTE — PHYSICAL EXAM
[Euthymic] : euthymic [Cooperative] : cooperative [Full] : full [Clear] : clear [Linear/Goal Directed] : linear/goal directed [Average] : average [WNL] : within normal limits

## 2023-12-13 NOTE — PLAN
[FreeTextEntry2] : Pt will continue with twice weekly individual psychotherapy.  [Psychodynamic Therapy] : Psychodynamic Therapy  [de-identified] : Pt arrived on time to individual psychotherapy session. Pt continued reporting on his challenges related to his academic performance, his family relationships, and his plans for the future. Pt described feeling anxiety in session which was explored further in depth. Pt left session in good emotional and behavioral control.  [Recommended Frequency of Visits: ____] : Recommended frequency of visits: [unfilled] [FreeTextEntry1] : Pt will continue twice weekly individual psychotherapy with writer, and attend next session this week.

## 2023-12-13 NOTE — ADDENDUM
[FreeTextEntry1] : Writer will review psychotherapy session in detail during supervision with community supervisor: Linda Connell, PhD

## 2023-12-13 NOTE — END OF VISIT
[Duration of Psychotherapy Visit (minutes spent in synchronous communication): ____] : Duration of Psychotherapy Visit (minutes spent in synchronous communication): [unfilled] [Individual Psychotherapy for 38-52 minutes] : Individual Psychotherapy for 38 - 52 minutes [Teletherapy Service Provided] : The services provided in this session were delivered via tele-therapy [FreeTextEntry3] : [place] [FreeTextEntry5] : [place]

## 2023-12-14 ENCOUNTER — APPOINTMENT (OUTPATIENT)
Dept: PSYCHIATRY | Facility: CLINIC | Age: 23
End: 2023-12-14

## 2023-12-14 NOTE — REASON FOR VISIT
[Access issues (e.g., transportation, impaired mobility, etc.)] : due to patient's access issues [Continuity of care] : to ensure continuity of care [Continuing, patient not seen in-person within last 12 months (provide details below)] : Telehealth services are continuing, patient not seen in-person within last 12 months.  [Telehealth (audio & video) - Individual/Group] : This visit was provided via telehealth using real-time 2-way audio visual technology. [Other Location: e.g. Home (Enter Location, City,State)___] : The provider was located at [unfilled]. [Home] : The patient, [unfilled], was located at home, [unfilled], at the time of the visit. [Verbal consent obtained from patient/other participant(s)] : Verbal consent for telehealth/telephonic services obtained from patient/other participant(s) [FreeTextEntry4] : 4:00 PM [FreeTextEntry5] : 4:45 PM [FreeTextEntry3] : In-person session not clinically indicated. Pt prefers telehealth due to access issues.  [Patient] : Patient

## 2023-12-14 NOTE — PLAN
[FreeTextEntry2] : Pt will continue with weekly individual psychotherapy.  [Psychodynamic Therapy] : Psychodynamic Therapy  [de-identified] : Pt arrived on time to individual psychotherapy session. Pt shared the news of his uncle passing away and his resulting sadness and grief. Therapist made space to process pt's thoughts and feelings, and meet pt where he was at. Pt and therapist collaboratively discussed ways of processing grief. Pt left session in good emotional and behavioral control.  [Recommended Frequency of Visits: ____] : Recommended frequency of visits: [unfilled] [FreeTextEntry1] : Pt will continue twice weekly individual psychotherapy with writer, and attend next session next week.

## 2023-12-19 ENCOUNTER — APPOINTMENT (OUTPATIENT)
Dept: PSYCHIATRY | Facility: CLINIC | Age: 23
End: 2023-12-19

## 2023-12-21 ENCOUNTER — APPOINTMENT (OUTPATIENT)
Dept: PSYCHIATRY | Facility: CLINIC | Age: 23
End: 2023-12-21

## 2023-12-21 NOTE — PLAN
[FreeTextEntry2] : Pt will continue with weekly individual psychotherapy.  [Psychodynamic Therapy] : Psychodynamic Therapy  [de-identified] : Pt arrived on time to individual psychotherapy session. Pt had moved back home to his father's house since last session so this was discussed and processed in depth. Pt and therapist engaged in relational processing over pt's avoidance of difficult emotions in therapy. Pt left session in good emotional and behavioral control.  [Recommended Frequency of Visits: ____] : Recommended frequency of visits: [unfilled] [FreeTextEntry1] : Pt will continue twice weekly individual psychotherapy with writer, and attend next session next week.

## 2023-12-26 ENCOUNTER — APPOINTMENT (OUTPATIENT)
Dept: PSYCHIATRY | Facility: CLINIC | Age: 23
End: 2023-12-26

## 2023-12-28 ENCOUNTER — APPOINTMENT (OUTPATIENT)
Dept: PSYCHIATRY | Facility: CLINIC | Age: 23
End: 2023-12-28

## 2023-12-28 NOTE — PLAN
[FreeTextEntry2] : Pt will continue with weekly individual psychotherapy.  [Psychodynamic Therapy] : Psychodynamic Therapy  [de-identified] : Pt arrived on time to individual psychotherapy session. Therapist followed up on pt's suicidal thoughts, pt did not report any changes in his baseline. Pt reported on recent challenging communications with his father. Pt and therapist collaboratively processed pt's tendencies to "flight" or avoid emotional intensity in conversations and how this affects his relationships. Pt left session in good emotional and behavioral control.  [Recommended Frequency of Visits: ____] : Recommended frequency of visits: [unfilled] [FreeTextEntry1] : Pt will continue twice weekly individual psychotherapy with writer, and attend next session next week.

## 2023-12-28 NOTE — PLAN
[FreeTextEntry2] : Pt will continue with weekly individual psychotherapy.  [Psychodynamic Therapy] : Psychodynamic Therapy  [de-identified] : Pt arrived on time to individual psychotherapy session. Pt endorsed passive suicidal thoughts of "wanting to not exist and not wanting to have to deal with his life". Pt denied suicidal intent, ideation, methods, or plan and pt displayed future orientation. Thus pt was deemed not an imminent harm to himself. Pt and therapist discussed themes related to finding meaning, a life worth living, and values. Pt left session in good emotional and behavioral control.  [Recommended Frequency of Visits: ____] : Recommended frequency of visits: [unfilled] [FreeTextEntry1] : Pt will continue twice weekly individual psychotherapy with writer, and attend next session next week.

## 2023-12-29 ENCOUNTER — APPOINTMENT (OUTPATIENT)
Dept: PSYCHIATRY | Facility: CLINIC | Age: 23
End: 2023-12-29

## 2024-01-01 ENCOUNTER — OUTPATIENT (OUTPATIENT)
Dept: OUTPATIENT SERVICES | Facility: HOSPITAL | Age: 24
LOS: 1 days | Discharge: HOME | End: 2024-01-01

## 2024-01-03 ENCOUNTER — APPOINTMENT (OUTPATIENT)
Dept: PSYCHIATRY | Facility: CLINIC | Age: 24
End: 2024-01-03

## 2024-01-03 NOTE — PLAN
[FreeTextEntry2] : Pt will continue with weekly individual psychotherapy.  [Psychodynamic Therapy] : Psychodynamic Therapy  [de-identified] : Pt arrived on time to individual psychotherapy session. Pt and therapist continued discussing pt's avoidance patterns and how that impacts his relationships and longterm goals. Pt's goals for himself and his future were also revisited and therapist invited pt to deepen his exploration of his fantasies for himself. Pt left session in good emotional and behavioral control.  [Recommended Frequency of Visits: ____] : Recommended frequency of visits: [unfilled] [FreeTextEntry1] : Pt will continue twice weekly individual psychotherapy with writer, and attend next session next week.

## 2024-01-03 NOTE — REASON FOR VISIT
[Access issues (e.g., transportation, impaired mobility, etc.)] : due to patient's access issues [Continuity of care] : to ensure continuity of care [Continuing, patient not seen in-person within last 12 months (provide details below)] : Telehealth services are continuing, patient not seen in-person within last 12 months.  [Telehealth (audio & video) - Individual/Group] : This visit was provided via telehealth using real-time 2-way audio visual technology. [Other Location: e.g. Home (Enter Location, City,State)___] : The provider was located at [unfilled]. [Home] : The patient, [unfilled], was located at home, [unfilled], at the time of the visit. [Verbal consent obtained from patient/other participant(s)] : Verbal consent for telehealth/telephonic services obtained from patient/other participant(s) [FreeTextEntry4] : 10:30 AM [FreeTextEntry5] : 11:15 AM [FreeTextEntry3] : In-person session not clinically indicated. Pt prefers telehealth due to access issues.  [Patient] : Patient

## 2024-01-04 ENCOUNTER — APPOINTMENT (OUTPATIENT)
Dept: PSYCHIATRY | Facility: CLINIC | Age: 24
End: 2024-01-04

## 2024-01-04 NOTE — PLAN
[FreeTextEntry2] : Pt will continue with weekly individual psychotherapy.  [Psychodynamic Therapy] : Psychodynamic Therapy  [de-identified] : Pt arrived on time to individual psychotherapy session. Pt reported on recent incident with his dad that triggered significant feelings of sadness and anger. Pt and therapist discussed pt's emotional experiences and how these relate to his identity as a Black man. Pt showed greater attunement to himself and his emotional states which was reinforced by therapist. Pt left session in good emotional and behavioral control.  [Recommended Frequency of Visits: ____] : Recommended frequency of visits: [unfilled] [FreeTextEntry1] : Pt will continue twice weekly individual psychotherapy with writer, and attend next session next week.

## 2024-01-09 ENCOUNTER — APPOINTMENT (OUTPATIENT)
Dept: PSYCHIATRY | Facility: CLINIC | Age: 24
End: 2024-01-09
Payer: COMMERCIAL

## 2024-01-09 VITALS — WEIGHT: 221 LBS | HEIGHT: 72 IN | BODY MASS INDEX: 29.93 KG/M2

## 2024-01-09 PROCEDURE — 99214 OFFICE O/P EST MOD 30 MIN: CPT | Mod: 95

## 2024-01-10 ENCOUNTER — APPOINTMENT (OUTPATIENT)
Dept: PSYCHIATRY | Facility: CLINIC | Age: 24
End: 2024-01-10

## 2024-01-11 ENCOUNTER — APPOINTMENT (OUTPATIENT)
Dept: PSYCHIATRY | Facility: CLINIC | Age: 24
End: 2024-01-11

## 2024-01-11 NOTE — PLAN
[FreeTextEntry2] : Pt will continue with weekly individual psychotherapy.  [Psychodynamic Therapy] : Psychodynamic Therapy  [de-identified] : Pt arrived on time to individual psychotherapy session. Pt described events over the past week as he attended his uncle's , spent time with his father, and contracted COVID-19. Therapist inquired on pt's emotional experiences and engaged pt in affective deepening. Pt was receptive and expressed feelings of sadness and anger. Pt left session in good emotional and behavioral control.  [Recommended Frequency of Visits: ____] : Recommended frequency of visits: [unfilled] [FreeTextEntry1] : Pt will continue twice weekly individual psychotherapy with writer, and attend next session next week.
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
## 2024-01-18 ENCOUNTER — APPOINTMENT (OUTPATIENT)
Dept: PSYCHIATRY | Facility: CLINIC | Age: 24
End: 2024-01-18

## 2024-01-22 ENCOUNTER — APPOINTMENT (OUTPATIENT)
Dept: PSYCHIATRY | Facility: CLINIC | Age: 24
End: 2024-01-22
Payer: COMMERCIAL

## 2024-01-22 PROCEDURE — 99213 OFFICE O/P EST LOW 20 MIN: CPT | Mod: 95

## 2024-01-22 NOTE — HISTORY OF PRESENT ILLNESS
[FreeTextEntry1] : Pt is seen and evaluated. Interval hx reviewed. Pt was unable to obtain adderal xr at last appointment due to "insurance issues". He plans to clarify the issue with his pharmacy today. In the interim he notes his family has been assisting in keeping him on track with calls and reminders. He has been utilizing mindfulness exercises to better manage the anxiety. He denies any other change from baseline.  Note: has been dx with adhd inattentive type, IZZY, and MDD with neuropsych testing in 2018. Prescribed Adderall 15mg by pediatrician, Dr. Gloria [phone]. (Takes adderall in afternoon to help with homework.) Pt has been in I therapy with Linda Connell for the past 5 years.   Decrease fluoxetine from 40mg to 20mg PO Qdaily on 10/6/23.

## 2024-01-22 NOTE — DISCUSSION/SUMMARY
[FreeTextEntry1] : Patient is a 24yo  (black/), cis gender, heterosexual male domiciled with his father, rising sophomore at [employer], with no significant PMHx, and PPHx significant for ADHD, IZZY, MDD recurrent, currently prescribed Adderall 15mg PO TID by pediatrician, in psychotherapy with Linda Connell for many years. Patient does not have a hx of psych hospitalization, suicide attempts, or psych ER visits. Pt. is the only child of  parents. Stressors include interpersonal distress related to relationship with parents and GF. Patient endorsed inattentive sxs of ADHD, IZZY, and depression and presented for medication consultation in the context of worsening mood and increased anxiety in Jan 2023 - started on Fluoxetine with good tolerance and response. Dose decreased from 40mg to 20mg in October 2023 per pt request.  Time Spent: Reviewing chart, developing rapport, psychoeducation, diagnosis, medication mgmt, reviewed risks vs benefits of meds, indication, and potential adverse effects. Reviewed past ADHD diagnosis and treatment. Shared decison to start adderall ER 10mg PO Qam and monitor for tolerance and response then to consider adjusting SSRI to better mange anxiety and binge eating. Shared decision to continue dose of fluoxetine given concerns about not having enough anxiety to motivate himself to get work/assignments done.  Name of PCP: Dr. Kimberley Gates Date of Last Physical Exam: 2019 (reminded to scheduled annual exam.) Date of Last Annual Labs: Cannot recall Annual Review of Systems Completed (Y/N): Y Tobacco Screening Completed (Y/N): Y (non-smoker)

## 2024-01-22 NOTE — PAST MEDICAL HISTORY
[FreeTextEntry1] : Patient has been in psychotherapy treatment since July 2018 with Linda Connell at the psychological center at [facility].  Patient terminated treatment there in May 2021.  Patient has also completed a neuropsychological testing in March 2018.  Patient was diagnosed with IZZY, ADHD, inattentive type, moderate, and MDD, recurring, moderate. Patient denies past psychiatric hospitalization.  Patient was prescribed Adderall 15 mg from pediatrician in August 2020. \par

## 2024-01-23 NOTE — PLAN
[FreeTextEntry2] : Pt will continue with weekly individual psychotherapy.  [Psychodynamic Therapy] : Psychodynamic Therapy  [de-identified] : Pt arrived on time to individual psychotherapy session. Pt shared the goals he came up with over the week for his return to school. Pt and therapist also discussed pt's weekly routine and schedule. Therapist provided validation and encouragement, and inquired about pt's increased feelings of motivation. Pt left session in good emotional and behavioral control.  [Recommended Frequency of Visits: ____] : Recommended frequency of visits: [unfilled] [FreeTextEntry1] : Pt will continue twice weekly individual psychotherapy with writer, and attend next session next week.

## 2024-01-25 ENCOUNTER — APPOINTMENT (OUTPATIENT)
Dept: PSYCHIATRY | Facility: CLINIC | Age: 24
End: 2024-01-25

## 2024-01-26 ENCOUNTER — APPOINTMENT (OUTPATIENT)
Dept: PSYCHIATRY | Facility: CLINIC | Age: 24
End: 2024-01-26

## 2024-01-26 NOTE — REASON FOR VISIT
[Access issues (e.g., transportation, impaired mobility, etc.)] : due to patient's access issues [Continuity of care] : to ensure continuity of care [Continuing, patient not seen in-person within last 12 months (provide details below)] : Telehealth services are continuing, patient not seen in-person within last 12 months.  [Telehealth (audio & video) - Individual/Group] : This visit was provided via telehealth using real-time 2-way audio visual technology. [Other Location: e.g. Home (Enter Location, City,State)___] : The provider was located at [unfilled]. [Home] : The patient, [unfilled], was located at home, [unfilled], at the time of the visit. [Verbal consent obtained from patient/other participant(s)] : Verbal consent for telehealth/telephonic services obtained from patient/other participant(s) [FreeTextEntry4] : 12:45 PM [FreeTextEntry5] : 1:30 PM [Patient] : Patient [FreeTextEntry3] : In-person session not clinically indicated. Pt prefers telehealth due to access issues.

## 2024-01-26 NOTE — PLAN
[FreeTextEntry2] : Pt will continue with weekly individual psychotherapy.  [Psychodynamic Therapy] : Psychodynamic Therapy  [de-identified] : Pt arrived on time to individual psychotherapy session. Pt reported on his feelings of stress due to his finances. Therapist and pt collaboratively identified ways for pt to budget and improve his spending habits in ways that better align with his goals. Pt left session in good emotional and behavioral control.  [Recommended Frequency of Visits: ____] : Recommended frequency of visits: [unfilled] [FreeTextEntry1] : Pt will continue twice weekly individual psychotherapy with writer, and attend next session next week.

## 2024-01-26 NOTE — END OF VISIT
[Duration of Psychotherapy Visit (minutes spent in synchronous communication): ____] : Duration of Psychotherapy Visit (minutes spent in synchronous communication): [unfilled] [Individual Psychotherapy for 38-52 minutes] : Individual Psychotherapy for 38 - 52 minutes [FreeTextEntry3] : [place] [Teletherapy Service Provided] : The services provided in this session were delivered via tele-therapy [FreeTextEntry5] : [place]

## 2024-01-26 NOTE — END OF VISIT
[Duration of Psychotherapy Visit (minutes spent in synchronous communication): ____] : Duration of Psychotherapy Visit (minutes spent in synchronous communication): [unfilled] [Teletherapy Service Provided] : The services provided in this session were delivered via tele-therapy [Individual Psychotherapy for 38-52 minutes] : Individual Psychotherapy for 38 - 52 minutes [FreeTextEntry3] : [place] [FreeTextEntry5] : [place]

## 2024-01-26 NOTE — REASON FOR VISIT
[Access issues (e.g., transportation, impaired mobility, etc.)] : due to patient's access issues [Continuing, patient not seen in-person within last 12 months (provide details below)] : Telehealth services are continuing, patient not seen in-person within last 12 months.  [Continuity of care] : to ensure continuity of care [Telehealth (audio & video) - Individual/Group] : This visit was provided via telehealth using real-time 2-way audio visual technology. [Other Location: e.g. Home (Enter Location, City,State)___] : The provider was located at [unfilled]. [Home] : The patient, [unfilled], was located at home, [unfilled], at the time of the visit. [Verbal consent obtained from patient/other participant(s)] : Verbal consent for telehealth/telephonic services obtained from patient/other participant(s) [FreeTextEntry4] : 4:00 PM [FreeTextEntry5] : 4:45 PM [FreeTextEntry3] : In-person session not clinically indicated. Pt prefers telehealth due to access issues.  [Patient] : Patient

## 2024-01-26 NOTE — PLAN
[FreeTextEntry2] : Pt will continue with weekly individual psychotherapy.  [Psychodynamic Therapy] : Psychodynamic Therapy  [de-identified] : Pt arrived on time to individual psychotherapy session. Pt continued discussing his thoughts and feelings related to money and finances. Therapist and pt collaboratively identified underlying roots of behavior cycles that pt described feeling stuck in. Pt left session in good emotional and behavioral control.  [Recommended Frequency of Visits: ____] : Recommended frequency of visits: [unfilled] [FreeTextEntry1] : Pt will continue twice weekly individual psychotherapy with writer, and attend next session next week.

## 2024-01-31 ENCOUNTER — APPOINTMENT (OUTPATIENT)
Dept: PSYCHIATRY | Facility: CLINIC | Age: 24
End: 2024-01-31

## 2024-02-01 ENCOUNTER — NON-APPOINTMENT (OUTPATIENT)
Age: 24
End: 2024-02-01

## 2024-02-01 NOTE — REASON FOR VISIT
[Access issues (e.g., transportation, impaired mobility, etc.)] : due to patient's access issues [Continuity of care] : to ensure continuity of care [Continuing, patient not seen in-person within last 12 months (provide details below)] : Telehealth services are continuing, patient not seen in-person within last 12 months.  [Telehealth (audio & video) - Individual/Group] : This visit was provided via telehealth using real-time 2-way audio visual technology. [Other Location: e.g. Home (Enter Location, City,State)___] : The provider was located at [unfilled]. [Home] : The patient, [unfilled], was located at home, [unfilled], at the time of the visit. [Verbal consent obtained from patient/other participant(s)] : Verbal consent for telehealth/telephonic services obtained from patient/other participant(s) [FreeTextEntry4] : 12:45 PM [FreeTextEntry5] : 1:30 PM [FreeTextEntry3] : In-person session not clinically indicated. Pt prefers telehealth due to access issues.  [Patient] : Patient

## 2024-02-01 NOTE — PLAN
[FreeTextEntry2] : Pt will continue with weekly individual psychotherapy.  [Psychodynamic Therapy] : Psychodynamic Therapy  [de-identified] : Pt arrived on time to individual psychotherapy session. Pt reported on his progress towards his goals with school, finances, and work. Pt described moments of feeling proud and a sense of accomplishment which was explored further through affective deepening. Therapist and pt reflected on feelings of pride and how that relates to pt's self image. Pt left session in good emotional and behavioral control.  [Recommended Frequency of Visits: ____] : Recommended frequency of visits: [unfilled] [FreeTextEntry1] : Pt will continue twice weekly individual psychotherapy with writer, and attend next session next week.

## 2024-02-02 ENCOUNTER — APPOINTMENT (OUTPATIENT)
Dept: PSYCHIATRY | Facility: CLINIC | Age: 24
End: 2024-02-02

## 2024-02-05 NOTE — PLAN
[FreeTextEntry2] : Pt will continue with weekly individual psychotherapy.  [Psychodynamic Therapy] : Psychodynamic Therapy  [de-identified] : Pt arrived on time to individual psychotherapy session. Pt described recent interactions with his grandfather and recounted childhood experiences growing up with his grandfather's side of the family. Pt and therapist sat with various feelings stemming from complex family dynamics and examined the resulting impact on his identity. Pt left session in good emotional and behavioral control.  [Recommended Frequency of Visits: ____] : Recommended frequency of visits: [unfilled] [FreeTextEntry1] : Pt will continue twice weekly individual psychotherapy with writer, and attend next session next week.

## 2024-02-07 ENCOUNTER — APPOINTMENT (OUTPATIENT)
Dept: PSYCHIATRY | Facility: CLINIC | Age: 24
End: 2024-02-07

## 2024-02-08 DIAGNOSIS — F60.0 PARANOID PERSONALITY DISORDER: ICD-10-CM

## 2024-02-09 ENCOUNTER — APPOINTMENT (OUTPATIENT)
Dept: PSYCHIATRY | Facility: CLINIC | Age: 24
End: 2024-02-09

## 2024-02-09 NOTE — PHYSICAL EXAM
[2 - 2  to 4 times a month] : 2 - 2  to 4 times a month [3 - 7 to 9] : 3 - 7 to 9 [3 - Weekly] : 3 - Weekly [0 - Never] : 0 - Never [0 - No] : 0 - No [2 - Yes, but not in the last year] : 2 - Yes, but not in the last year [3] : 3 [4] : 4 [2] : 2 [] : No [FreeTextEntry1] : 10 [SchwartzScore] : 28

## 2024-02-09 NOTE — DISCUSSION/SUMMARY
[Plan Review] : Plan Review [Attempting to realize their potential] : Attempting to realize their potential [Motivated to participate in treatment] : motivated to participate in treatment [Part of a supportive family] : part of a supportive family [English fluency] : English fluency [Mental Health] : Mental Health [Initial] : Initial [___ times a week] : [unfilled] times a week [Improvement in symptoms as evidenced by:] : Improvement in symptoms as evidenced by: [Yes] : Yes [Supervisor (if needed)] : Supervisor [Able to manage surrounding demands and opportunities] : able to manage surrounding demands and opportunities [Adherent to treatment recommendations] : adherent to treatment recommendations [Articulate] : articulate [Cognitively intact] : cognitively intact [Insightful] : insightful [Creative] : creative [Intelligent] : intelligent [Motivated and ready for change] : motivated and ready for change [Health literacy] : health literacy [Motivated to maintain or improve physical health] : motivated to maintain or improve physical health [Has vocational interests or hobbies] : has vocational interests or hobbies [Steady employment] : steady employment [Housing stability] : housing stability [High level of education] : high level of education [Connected to healthcare] : connected to healthcare [Access to safe outdoor spaces] : access to safe outdoor spaces [Social supports] : social supports [FreeTextEntry3] : 7/21/21 [FreeTextEntry2] : 8/1/24 [Continued - Progress made] : Continued - Progress made: [FreeTextEntry1] : Managing symptoms of anxiety [FreeTextEntry4] : "Be more productive in daily life and work towards stated life goals." [de-identified] : Pt will continue to utilize coping skills to effectively plan and organize himself.  [de-identified] : 8/1/24 [de-identified] : Pt will continue building coping skills [de-identified] : Pt will identify problem solving skills and increase decision making.  [de-identified] : Work on improving financial wellbeing [de-identified] : 8/1/24 [de-identified] : Pt will create routine and work on habit building around his spending  [FreeTextEntry5] : Pt continues to identify ineffective behaviors to support improved organization and planning and to reduce feelings of overwhelm and anxiety. [de-identified] : Psychodynamic therapy [de-identified] : effective coping skills and observed independence

## 2024-02-09 NOTE — RISK ASSESSMENT
[Yes] : Yes [No] : No [Low acute suicide risk] : Low acute suicide risk [Not clinically indicated] : Safety Plan completed/updated (for individuals at risk): Not clinically indicated [No, patient denies ideation or behavior] : No, patient denies ideation or behavior

## 2024-02-09 NOTE — DISCUSSION/SUMMARY
[Plan Review] : Plan Review [Attempting to realize their potential] : Attempting to realize their potential [Motivated to participate in treatment] : motivated to participate in treatment [Part of a supportive family] : part of a supportive family [English fluency] : English fluency [Mental Health] : Mental Health [Initial] : Initial [___ times a week] : [unfilled] times a week [Improvement in symptoms as evidenced by:] : Improvement in symptoms as evidenced by: [Yes] : Yes [Supervisor (if needed)] : Supervisor [Able to manage surrounding demands and opportunities] : able to manage surrounding demands and opportunities [Adherent to treatment recommendations] : adherent to treatment recommendations [Articulate] : articulate [Cognitively intact] : cognitively intact [Insightful] : insightful [Creative] : creative [Intelligent] : intelligent [Motivated and ready for change] : motivated and ready for change [Health literacy] : health literacy [Motivated to maintain or improve physical health] : motivated to maintain or improve physical health [Has vocational interests or hobbies] : has vocational interests or hobbies [Steady employment] : steady employment [Housing stability] : housing stability [High level of education] : high level of education [Connected to healthcare] : connected to healthcare [Access to safe outdoor spaces] : access to safe outdoor spaces [Social supports] : social supports [FreeTextEntry2] : 8/1/24 [FreeTextEntry3] : 7/21/21 [Continued - Progress made] : Continued - Progress made: [FreeTextEntry1] : Managing symptoms of anxiety [FreeTextEntry4] : "Be more productive in daily life and work towards stated life goals." [de-identified] : Pt will continue to utilize coping skills to effectively plan and organize himself.  [de-identified] : 8/1/24 [de-identified] : Pt will continue building coping skills [de-identified] : Pt will identify problem solving skills and increase decision making.  [de-identified] : Work on improving financial wellbeing [de-identified] : 8/1/24 [de-identified] : Pt will create routine and work on habit building around his spending  [FreeTextEntry5] : Pt continues to identify ineffective behaviors to support improved organization and planning and to reduce feelings of overwhelm and anxiety. [de-identified] : Psychodynamic therapy [de-identified] : effective coping skills and observed independence

## 2024-02-14 ENCOUNTER — APPOINTMENT (OUTPATIENT)
Dept: PSYCHIATRY | Facility: CLINIC | Age: 24
End: 2024-02-14

## 2024-02-16 ENCOUNTER — APPOINTMENT (OUTPATIENT)
Dept: PSYCHIATRY | Facility: CLINIC | Age: 24
End: 2024-02-16

## 2024-02-21 ENCOUNTER — NON-APPOINTMENT (OUTPATIENT)
Age: 24
End: 2024-02-21

## 2024-02-21 ENCOUNTER — APPOINTMENT (OUTPATIENT)
Dept: PSYCHIATRY | Facility: CLINIC | Age: 24
End: 2024-02-21
Payer: COMMERCIAL

## 2024-02-21 PROCEDURE — 99213 OFFICE O/P EST LOW 20 MIN: CPT | Mod: 95

## 2024-02-21 RX ORDER — DEXTROAMPHETAMINE SACCHARATE, AMPHETAMINE ASPARTATE MONOHYDRATE, DEXTROAMPHETAMINE SULFATE AND AMPHETAMINE SULFATE 3.75; 3.75; 3.75; 3.75 MG/1; MG/1; MG/1; MG/1
15 CAPSULE, EXTENDED RELEASE ORAL DAILY
Qty: 30 | Refills: 0 | Status: ACTIVE | COMMUNITY
Start: 2024-02-21 | End: 1900-01-01

## 2024-02-21 RX ORDER — DEXTROAMPHETAMINE SACCHARATE, AMPHETAMINE ASPARTATE MONOHYDRATE, DEXTROAMPHETAMINE SULFATE AND AMPHETAMINE SULFATE 2.5; 2.5; 2.5; 2.5 MG/1; MG/1; MG/1; MG/1
10 CAPSULE, EXTENDED RELEASE ORAL
Qty: 30 | Refills: 0 | Status: DISCONTINUED | COMMUNITY
Start: 2024-01-09 | End: 2024-02-21

## 2024-02-21 NOTE — DISCUSSION/SUMMARY
[FreeTextEntry1] : Patient is a 22yo  (black/), cis gender, heterosexual male domiciled with his father, rising sophomore at [employer] , with no significant PMHx, and PPHx significant for ADHD, IZZY, MDD recurrent, currently prescribed Adderall 15mg PO TID by pediatrician, in psychotherapy with Linda Connell for many years. Patient does not have a hx of psych hospitalization, suicide attempts, or psych ER visits. Pt. is the only child of  parents. Stressors include interpersonal distress related to relationship with parents and GF. Patient endorsed inattentive sxs of ADHD, IZZY, and depression and presented for medication consultation in the context of worsening mood and increased anxiety in Jan 2023 - started on Fluoxetine with good tolerance and response. Dose decreased from 40mg to 20mg in October 2023 per pt request.  Time Spent: Reviewing chart, developing rapport, psychoeducation, diagnosis, medication mgmt, reviewed risks vs benefits of meds, indication, and potential adverse effects. Reviewed past ADHD diagnosis and treatment. Shared decison to increase adderall ER 10mg to 15mg PO Qam and monitor for tolerance and response then to consider adjusting SSRI to better management of anxiety and binge eating. Shared decision to continue dose of fluoxetine given concerns about not having enough anxiety to motivate himself to get work/assignments done.  Name of PCP: Dr. Kimberley Gates Date of Last Physical Exam: 2019 (reminded to scheduled annual exam.) Date of Last Annual Labs: Cannot recall Annual Review of Systems Completed (Y/N): Y Tobacco Screening Completed (Y/N): Y (non-smoker)

## 2024-02-21 NOTE — PAST MEDICAL HISTORY
[FreeTextEntry1] : Patient has been in psychotherapy treatment since July 2018 with Linda Connell at the psychological center at [facility].  Patient terminated treatment there in May 2021.  Patient has also completed a neuropsychological testing in March 2018.  Patient was diagnosed with IZZY, ADHD, inattentive type, moderate, and MDD, recurring, moderate. Patient denies past psychiatric hospitalization.  Patient was prescribed Adderall 15 mg from pediatrician in August 2020. \par

## 2024-02-21 NOTE — REASON FOR VISIT
[FreeTextEntry4] : 10:00a [FreeTextEntry5] : 10:30a [FreeTextEntry2] : 1/9/24 [FreeTextEntry1] : Psychiatric follow up

## 2024-02-21 NOTE — PLAN
[FreeTextEntry4] : Recently started meds to address sxs of anxiety, depression, binge eating. [FreeTextEntry5] : PLAN: #) Continue fluoxetine 20mg PO QDaily  #) Start adderall ER 10mg PO QDaily - previously prescribed by juliana #) Continue I therapy #) F/U in one month [Medication education provided] : Medication education provided. [Rationale for medication choices, possible risks/precautions, benefits, alternative treatment choices, and consequences of non-treatment discussed] : Rationale for medication choices, possible risks/precautions, benefits, alternative treatment choices, and consequences of non-treatment discussed with patient/family/caregiver  [Order(s) for medication placed in [facility] EHR] : Medication

## 2024-02-21 NOTE — RISK ASSESSMENT
[FreeTextEntry9] : Low acute risk. Passive SI with no plan or intent. No hx of attempts. Protective factors include family support, future orientation and connection to care.

## 2024-02-21 NOTE — SOCIAL HISTORY
[FreeTextEntry1] : Living with Mother and Father in separate homes.  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

## 2024-02-21 NOTE — RISK ASSESSMENT
[FreeTextEntry9] : Low acute risk. Passive SI with no plan or intent. No hx of attempts. Protective factors include family support, future orientation and connection to care.  [No, patient denies ideation or behavior] : No, patient denies ideation or behavior [Yes] : Yes [Low acute suicide risk] : Low acute suicide risk [No] : No [Not clinically indicated] : Safety Plan completed/updated (for individuals at risk): Not clinically indicated

## 2024-02-21 NOTE — PLAN
[FreeTextEntry4] : Recently started meds to address sxs of anxiety, depression, binge eating. [FreeTextEntry5] : PLAN: #) Continue fluoxetine 20mg PO QDaily  #) Increase adderall ER 10mg to 15mg PO QDaily on 2/21/24 - previously prescribed by juliana #) Continue I therapy #) F/U in one month

## 2024-02-21 NOTE — REASON FOR VISIT
[FreeTextEntry4] : 1:00p [FreeTextEntry5] : 1:30p [FreeTextEntry2] : 1/9/24 [FreeTextEntry1] : Psychiatric follow up [Telehealth (audio & video) - Individual/Group] : This visit was provided via telehealth using real-time 2-way audio visual technology. [Patient preference] : Patient preference. [Other Location: e.g. Home (Enter Location, City,State)___] : The provider was located at [unfilled]. [Home] : The patient, [unfilled], was located at home, [unfilled], at the time of the visit. [Verbal consent obtained from patient/other participant(s)] : Verbal consent for telehealth/telephonic services obtained from patient/other participant(s) [Patient] : Patient

## 2024-02-21 NOTE — HISTORY OF PRESENT ILLNESS
[FreeTextEntry1] : Pt is seen and evaluated. Interval hx reviewed. He has been taking adderall approx. 5 days a week. Effects last approx. 6 hours and include increase focus, ability to sustain attention, and ability to complete tasks. He denies any side effects including increased irritability, anxiety and insomnia. Continues to take fluoxetine with good tolerance and response. Continues therapy weekly. Binge eating is "kind of inconsistent" but less pronounced when he takes adderall. He denies any other change from baseline.  Note: has been dx with adhd inattentive type, IZZY, and MDD with neuropsych testing in 2018. Prescribed Adderall 15mg by pediatrician, Dr. Gloria [phone]. Decrease fluoxetine from 40mg to 20mg PO Qdaily on 10/6/23.

## 2024-02-23 ENCOUNTER — APPOINTMENT (OUTPATIENT)
Dept: PSYCHIATRY | Facility: CLINIC | Age: 24
End: 2024-02-23

## 2024-02-28 ENCOUNTER — APPOINTMENT (OUTPATIENT)
Dept: PSYCHIATRY | Facility: CLINIC | Age: 24
End: 2024-02-28

## 2024-03-01 ENCOUNTER — APPOINTMENT (OUTPATIENT)
Dept: PSYCHIATRY | Facility: CLINIC | Age: 24
End: 2024-03-01

## 2024-03-04 NOTE — END OF VISIT
[Individual Psychotherapy for 38-52 minutes] : Individual Psychotherapy for 38 - 52 minutes [Duration of Psychotherapy Visit (minutes spent in synchronous communication): ____] : Duration of Psychotherapy Visit (minutes spent in synchronous communication): [unfilled] [FreeTextEntry3] : [place] [Teletherapy Service Provided] : The services provided in this session were delivered via tele-therapy [FreeTextEntry5] : [place]

## 2024-03-04 NOTE — REASON FOR VISIT
[Access issues (e.g., transportation, impaired mobility, etc.)] : due to patient's access issues [Continuity of care] : to ensure continuity of care [Continuing, patient not seen in-person within last 12 months (provide details below)] : Telehealth services are continuing, patient not seen in-person within last 12 months.  [Telehealth (audio & video) - Individual/Group] : This visit was provided via telehealth using real-time 2-way audio visual technology. [Other Location: e.g. Home (Enter Location, City,State)___] : The provider was located at [unfilled]. [Home] : The patient, [unfilled], was located at home, [unfilled], at the time of the visit. [Verbal consent obtained from patient/other participant(s)] : Verbal consent for telehealth/telephonic services obtained from patient/other participant(s) [FreeTextEntry4] : 12:00 PM [FreeTextEntry3] : In-person session not clinically indicated. Pt prefers telehealth due to access issues.  [FreeTextEntry5] : 12:45 PM [Patient] : Patient

## 2024-03-04 NOTE — DISCUSSION/SUMMARY
[Plan Review] : Plan Review [Adherent to treatment recommendations] : adherent to treatment recommendations [Able to manage surrounding demands and opportunities] : able to manage surrounding demands and opportunities [Articulate] : articulate [Attempting to realize their potential] : Attempting to realize their potential [Cognitively intact] : cognitively intact [Insightful] : insightful [Creative] : creative [Intelligent] : intelligent [Motivated to participate in treatment] : motivated to participate in treatment [Health literacy] : health literacy [Motivated and ready for change] : motivated and ready for change [Motivated to maintain or improve physical health] : motivated to maintain or improve physical health [Has vocational interests or hobbies] : has vocational interests or hobbies [Part of a supportive family] : part of a supportive family [Steady employment] : steady employment [Housing stability] : housing stability [High level of education] : high level of education [English fluency] : English fluency [Connected to healthcare] : connected to healthcare [Access to safe outdoor spaces] : access to safe outdoor spaces [Social supports] : social supports [FreeTextEntry2] : 8/1/24 [FreeTextEntry3] : 7/21/21 [Mental Health] : Mental Health [Continued - Progress made] : Continued - Progress made: [Initial] : Initial [___ times a week] : [unfilled] times a week [Improvement in symptoms as evidenced by:] : Improvement in symptoms as evidenced by: [Yes] : Yes [Supervisor (if needed)] : Supervisor [FreeTextEntry1] : Managing symptoms of anxiety [FreeTextEntry4] : "Be more productive in daily life and work towards stated life goals." [de-identified] : Pt will continue to utilize coping skills to effectively plan and organize himself.  [de-identified] : Pt will continue building coping skills [de-identified] : 8/1/24 [de-identified] : Work on improving financial wellbeing [de-identified] : Pt will identify problem solving skills and increase decision making.  [de-identified] : Pt will create routine and work on habit building around his spending  [de-identified] : 8/1/24 [de-identified] : Psychodynamic therapy [FreeTextEntry5] : Pt continues to identify ineffective behaviors to support improved organization and planning and to reduce feelings of overwhelm and anxiety. [de-identified] : effective coping skills and observed independence

## 2024-03-04 NOTE — PLAN
[FreeTextEntry2] : Pt will continue with weekly individual psychotherapy.  [de-identified] : Pt arrived on time to individual psychotherapy session. Pt and therapist followed up on pt's drawings from previous session that explored themes of identity, sense of self, and future goals. Pt identified areas of his life that he needs to explore further, and therapist examined what holds pt back. Pt left session in good emotional and behavioral control.  [Psychodynamic Therapy] : Psychodynamic Therapy  [Recommended Frequency of Visits: ____] : Recommended frequency of visits: [unfilled] [FreeTextEntry1] : Pt will continue twice weekly individual psychotherapy with writer, and attend next session next week.

## 2024-03-04 NOTE — REASON FOR VISIT
[Access issues (e.g., transportation, impaired mobility, etc.)] : due to patient's access issues [Continuity of care] : to ensure continuity of care [Continuing, patient not seen in-person within last 12 months (provide details below)] : Telehealth services are continuing, patient not seen in-person within last 12 months.  [Telehealth (audio & video) - Individual/Group] : This visit was provided via telehealth using real-time 2-way audio visual technology. [Other Location: e.g. Home (Enter Location, City,State)___] : The provider was located at [unfilled]. [Home] : The patient, [unfilled], was located at home, [unfilled], at the time of the visit. [Verbal consent obtained from patient/other participant(s)] : Verbal consent for telehealth/telephonic services obtained from patient/other participant(s) [FreeTextEntry4] : 12:00 PM [FreeTextEntry5] : 12:45 PM [FreeTextEntry3] : In-person session not clinically indicated. Pt prefers telehealth due to access issues.  [Patient] : Patient

## 2024-03-04 NOTE — DISCUSSION/SUMMARY
[Plan Review] : Plan Review [Able to manage surrounding demands and opportunities] : able to manage surrounding demands and opportunities [Adherent to treatment recommendations] : adherent to treatment recommendations [Attempting to realize their potential] : Attempting to realize their potential [Articulate] : articulate [Insightful] : insightful [Cognitively intact] : cognitively intact [Creative] : creative [Motivated to participate in treatment] : motivated to participate in treatment [Intelligent] : intelligent [Health literacy] : health literacy [Motivated and ready for change] : motivated and ready for change [Motivated to maintain or improve physical health] : motivated to maintain or improve physical health [Has vocational interests or hobbies] : has vocational interests or hobbies [Steady employment] : steady employment [Part of a supportive family] : part of a supportive family [High level of education] : high level of education [Housing stability] : housing stability [English fluency] : English fluency [Connected to healthcare] : connected to healthcare [Social supports] : social supports [Access to safe outdoor spaces] : access to safe outdoor spaces [FreeTextEntry3] : 7/21/21 [FreeTextEntry2] : 8/1/24 [Mental Health] : Mental Health [Continued - Progress made] : Continued - Progress made: [Initial] : Initial [___ times a week] : [unfilled] times a week [Improvement in symptoms as evidenced by:] : Improvement in symptoms as evidenced by: [Yes] : Yes [FreeTextEntry1] : Managing symptoms of anxiety [Supervisor (if needed)] : Supervisor [de-identified] : Pt will continue to utilize coping skills to effectively plan and organize himself.  [FreeTextEntry4] : "Be more productive in daily life and work towards stated life goals." [de-identified] : Pt will identify problem solving skills and increase decision making.  [de-identified] : 8/1/24 [de-identified] : Pt will continue building coping skills [de-identified] : 8/1/24 [de-identified] : Work on improving financial wellbeing [FreeTextEntry5] : Pt continues to identify ineffective behaviors to support improved organization and planning and to reduce feelings of overwhelm and anxiety. [de-identified] : Pt will create routine and work on habit building around his spending  [de-identified] : Psychodynamic therapy [de-identified] : effective coping skills and observed independence

## 2024-03-04 NOTE — PHYSICAL EXAM
[Cooperative] : cooperative [Euthymic] : euthymic [Full] : full [Clear] : clear [Linear/Goal Directed] : linear/goal directed [Average] : average [WNL] : within normal limits [] : No [3 - 7 to 9] : 3 - 7 to 9 [2 - 2  to 4 times a month] : 2 - 2  to 4 times a month [3 - Weekly] : 3 - Weekly [0 - Never] : 0 - Never [0 - No] : 0 - No [2 - Yes, but not in the last year] : 2 - Yes, but not in the last year [3] : 3 [4] : 4 [2] : 2 [FreeTextEntry1] : 10 [SchwartzScore] : 28

## 2024-03-04 NOTE — END OF VISIT
[Individual Psychotherapy for 38-52 minutes] : Individual Psychotherapy for 38 - 52 minutes [Duration of Psychotherapy Visit (minutes spent in synchronous communication): ____] : Duration of Psychotherapy Visit (minutes spent in synchronous communication): [unfilled] [Teletherapy Service Provided] : The services provided in this session were delivered via tele-therapy [FreeTextEntry3] : [place] [FreeTextEntry5] : [place]

## 2024-03-04 NOTE — DISCUSSION/SUMMARY
[Plan Review] : Plan Review [Adherent to treatment recommendations] : adherent to treatment recommendations [Able to manage surrounding demands and opportunities] : able to manage surrounding demands and opportunities [Articulate] : articulate [Attempting to realize their potential] : Attempting to realize their potential [Insightful] : insightful [Cognitively intact] : cognitively intact [Intelligent] : intelligent [Creative] : creative [Motivated to participate in treatment] : motivated to participate in treatment [Motivated and ready for change] : motivated and ready for change [Health literacy] : health literacy [Has vocational interests or hobbies] : has vocational interests or hobbies [Motivated to maintain or improve physical health] : motivated to maintain or improve physical health [Part of a supportive family] : part of a supportive family [Steady employment] : steady employment [High level of education] : high level of education [Housing stability] : housing stability [English fluency] : English fluency [Connected to healthcare] : connected to healthcare [Social supports] : social supports [Access to safe outdoor spaces] : access to safe outdoor spaces [FreeTextEntry2] : 8/1/24 [FreeTextEntry3] : 7/21/21 [Mental Health] : Mental Health [Continued - Progress made] : Continued - Progress made: [___ times a week] : [unfilled] times a week [Initial] : Initial [Improvement in symptoms as evidenced by:] : Improvement in symptoms as evidenced by: [Yes] : Yes [Supervisor (if needed)] : Supervisor [FreeTextEntry1] : Managing symptoms of anxiety [FreeTextEntry4] : "Be more productive in daily life and work towards stated life goals." [de-identified] : Pt will continue building coping skills [de-identified] : Pt will continue to utilize coping skills to effectively plan and organize himself.  [de-identified] : 8/1/24 [de-identified] : Pt will identify problem solving skills and increase decision making.  [de-identified] : Work on improving financial wellbeing [de-identified] : 8/1/24 [de-identified] : Pt will create routine and work on habit building around his spending  [FreeTextEntry5] : Pt continues to identify ineffective behaviors to support improved organization and planning and to reduce feelings of overwhelm and anxiety. [de-identified] : Psychodynamic therapy [de-identified] : effective coping skills and observed independence

## 2024-03-04 NOTE — PHYSICAL EXAM
[Cooperative] : cooperative [Euthymic] : euthymic [Full] : full [Clear] : clear [Linear/Goal Directed] : linear/goal directed [Average] : average [WNL] : within normal limits [2 - 2  to 4 times a month] : 2 - 2  to 4 times a month [] : No [3 - Weekly] : 3 - Weekly [3 - 7 to 9] : 3 - 7 to 9 [0 - Never] : 0 - Never [0 - No] : 0 - No [2 - Yes, but not in the last year] : 2 - Yes, but not in the last year [3] : 3 [4] : 4 [2] : 2 [FreeTextEntry1] : 1 [SchwartzScore] : 28

## 2024-03-04 NOTE — PHYSICAL EXAM
[Euthymic] : euthymic [Cooperative] : cooperative [Clear] : clear [Full] : full [Linear/Goal Directed] : linear/goal directed [Average] : average [WNL] : within normal limits [2 - 2  to 4 times a month] : 2 - 2  to 4 times a month [] : No [3 - Weekly] : 3 - Weekly [3 - 7 to 9] : 3 - 7 to 9 [0 - Never] : 0 - Never [0 - No] : 0 - No [2 - Yes, but not in the last year] : 2 - Yes, but not in the last year [4] : 4 [3] : 3 [2] : 2 [FreeTextEntry1] : 10 [SchwartzScore] : 28

## 2024-03-04 NOTE — RISK ASSESSMENT
[Yes] : Yes [No, patient denies ideation or behavior] : No, patient denies ideation or behavior [Low acute suicide risk] : Low acute suicide risk [Not clinically indicated] : Safety Plan completed/updated (for individuals at risk): Not clinically indicated [No] : No

## 2024-03-04 NOTE — END OF VISIT
[Duration of Psychotherapy Visit (minutes spent in synchronous communication): ____] : Duration of Psychotherapy Visit (minutes spent in synchronous communication): [unfilled] [Individual Psychotherapy for 38-52 minutes] : Individual Psychotherapy for 38 - 52 minutes [Teletherapy Service Provided] : The services provided in this session were delivered via tele-therapy [FreeTextEntry5] : [place] [FreeTextEntry3] : [place]

## 2024-03-04 NOTE — PHYSICAL EXAM
[Cooperative] : cooperative [Full] : full [Euthymic] : euthymic [Linear/Goal Directed] : linear/goal directed [Clear] : clear [Average] : average [WNL] : within normal limits [] : No [2 - 2  to 4 times a month] : 2 - 2  to 4 times a month [3 - Weekly] : 3 - Weekly [3 - 7 to 9] : 3 - 7 to 9 [0 - No] : 0 - No [0 - Never] : 0 - Never [2 - Yes, but not in the last year] : 2 - Yes, but not in the last year [2] : 2 [4] : 4 [3] : 3 [FreeTextEntry1] : 10 [SchwartzScore] : 28

## 2024-03-04 NOTE — PLAN
[FreeTextEntry2] : Pt will continue with weekly individual psychotherapy.  [Psychodynamic Therapy] : Psychodynamic Therapy  [de-identified] : Pt arrived on time to individual psychotherapy session. Pt and therapist discussed his history of romantic relationships, particularly pt's most recent relationship ending in a tough breakup in Aug 2021. Pt described this past relationship in depth and explored themes that link to his present self in relationships. Pt left session in good emotional and behavioral control.  [Recommended Frequency of Visits: ____] : Recommended frequency of visits: [unfilled] [FreeTextEntry1] : Pt will continue twice weekly individual psychotherapy with writer, and attend next session next week.

## 2024-03-04 NOTE — PHYSICAL EXAM
[Euthymic] : euthymic [Cooperative] : cooperative [Clear] : clear [Full] : full [Linear/Goal Directed] : linear/goal directed [WNL] : within normal limits [Average] : average [2 - 2  to 4 times a month] : 2 - 2  to 4 times a month [] : No [3 - Weekly] : 3 - Weekly [3 - 7 to 9] : 3 - 7 to 9 [0 - Never] : 0 - Never [0 - No] : 0 - No [2 - Yes, but not in the last year] : 2 - Yes, but not in the last year [3] : 3 [4] : 4 [2] : 2 [FreeTextEntry1] : 1 [SchwartzScore] : 28

## 2024-03-04 NOTE — PLAN
[FreeTextEntry2] : Pt will continue with weekly individual psychotherapy.  [Psychodynamic Therapy] : Psychodynamic Therapy  [Recommended Frequency of Visits: ____] : Recommended frequency of visits: [unfilled] [de-identified] : Pt arrived on time to individual psychotherapy session. Pt and therapist continued discussion around self-esteem, feelings of pride, and validation of one's self. Therapist and pt also engaged in relational meta processing on these themes within the therapeutic relationship. Pt left session in good emotional and behavioral control.  [FreeTextEntry1] : Pt will continue twice weekly individual psychotherapy with writer, and attend next session next week.

## 2024-03-04 NOTE — PLAN
[FreeTextEntry2] : Pt will continue with weekly individual psychotherapy.  [Psychodynamic Therapy] : Psychodynamic Therapy  [de-identified] : Pt arrived on time to individual psychotherapy session. Pt shared positive updated related to schoolwork, but reported having difficulty celebrating his accomplishments. Therapist and pt examined the roots of this further and attempted to sit with feelings of pride and self-validation. Pt left session in good emotional and behavioral control.  [Recommended Frequency of Visits: ____] : Recommended frequency of visits: [unfilled] [FreeTextEntry1] : Pt will continue twice weekly individual psychotherapy with writer, and attend next session next week.

## 2024-03-04 NOTE — PLAN
[Psychodynamic Therapy] : Psychodynamic Therapy  [FreeTextEntry2] : Pt will continue with weekly individual psychotherapy.  [de-identified] : Pt arrived on time to individual psychotherapy session. Pt provided updates over the week relating to his schoolwork, and increased socializing recently. Pt and therapist discussed social relationships and the role they play in pt's life. Pt left session in good emotional and behavioral control.  [Recommended Frequency of Visits: ____] : Recommended frequency of visits: [unfilled] [FreeTextEntry1] : Pt will continue twice weekly individual psychotherapy with writer, and attend next session next week.

## 2024-03-04 NOTE — RISK ASSESSMENT
[No, patient denies ideation or behavior] : No, patient denies ideation or behavior [Yes] : Yes [Low acute suicide risk] : Low acute suicide risk [No] : No [Not clinically indicated] : Safety Plan completed/updated (for individuals at risk): Not clinically indicated

## 2024-03-04 NOTE — DISCUSSION/SUMMARY
[Plan Review] : Plan Review [Able to manage surrounding demands and opportunities] : able to manage surrounding demands and opportunities [Adherent to treatment recommendations] : adherent to treatment recommendations [Articulate] : articulate [Attempting to realize their potential] : Attempting to realize their potential [Cognitively intact] : cognitively intact [Insightful] : insightful [Intelligent] : intelligent [Creative] : creative [Motivated to participate in treatment] : motivated to participate in treatment [Motivated and ready for change] : motivated and ready for change [Health literacy] : health literacy [Has vocational interests or hobbies] : has vocational interests or hobbies [Motivated to maintain or improve physical health] : motivated to maintain or improve physical health [Part of a supportive family] : part of a supportive family [Steady employment] : steady employment [Housing stability] : housing stability [High level of education] : high level of education [English fluency] : English fluency [Connected to healthcare] : connected to healthcare [Access to safe outdoor spaces] : access to safe outdoor spaces [Social supports] : social supports [FreeTextEntry3] : 7/21/21 [FreeTextEntry2] : 8/1/24 [Mental Health] : Mental Health [Continued - Progress made] : Continued - Progress made: [Initial] : Initial [___ times a week] : [unfilled] times a week [Improvement in symptoms as evidenced by:] : Improvement in symptoms as evidenced by: [Yes] : Yes [FreeTextEntry1] : Managing symptoms of anxiety [Supervisor (if needed)] : Supervisor [de-identified] : Pt will continue to utilize coping skills to effectively plan and organize himself.  [FreeTextEntry4] : "Be more productive in daily life and work towards stated life goals." [de-identified] : Pt will continue building coping skills [de-identified] : 8/1/24 [de-identified] : Work on improving financial wellbeing [de-identified] : Pt will identify problem solving skills and increase decision making.  [de-identified] : 8/1/24 [FreeTextEntry5] : Pt continues to identify ineffective behaviors to support improved organization and planning and to reduce feelings of overwhelm and anxiety. [de-identified] : Pt will create routine and work on habit building around his spending  [de-identified] : Psychodynamic therapy [de-identified] : effective coping skills and observed independence

## 2024-03-04 NOTE — END OF VISIT
[Duration of Psychotherapy Visit (minutes spent in synchronous communication): ____] : Duration of Psychotherapy Visit (minutes spent in synchronous communication): [unfilled] [Teletherapy Service Provided] : The services provided in this session were delivered via tele-therapy [Individual Psychotherapy for 38-52 minutes] : Individual Psychotherapy for 38 - 52 minutes [FreeTextEntry5] : [place] [FreeTextEntry3] : [place]

## 2024-03-04 NOTE — DISCUSSION/SUMMARY
[Able to manage surrounding demands and opportunities] : able to manage surrounding demands and opportunities [Plan Review] : Plan Review [Articulate] : articulate [Adherent to treatment recommendations] : adherent to treatment recommendations [Cognitively intact] : cognitively intact [Attempting to realize their potential] : Attempting to realize their potential [Insightful] : insightful [Intelligent] : intelligent [Creative] : creative [Motivated to participate in treatment] : motivated to participate in treatment [Motivated and ready for change] : motivated and ready for change [Health literacy] : health literacy [Motivated to maintain or improve physical health] : motivated to maintain or improve physical health [Has vocational interests or hobbies] : has vocational interests or hobbies [Part of a supportive family] : part of a supportive family [Housing stability] : housing stability [Steady employment] : steady employment [High level of education] : high level of education [English fluency] : English fluency [Access to safe outdoor spaces] : access to safe outdoor spaces [Connected to healthcare] : connected to healthcare [Social supports] : social supports [FreeTextEntry2] : 8/1/24 [FreeTextEntry3] : 7/21/21 [Mental Health] : Mental Health [Continued - Progress made] : Continued - Progress made: [Initial] : Initial [___ times a week] : [unfilled] times a week [Improvement in symptoms as evidenced by:] : Improvement in symptoms as evidenced by: [Yes] : Yes [Supervisor (if needed)] : Supervisor [FreeTextEntry1] : Managing symptoms of anxiety [FreeTextEntry4] : "Be more productive in daily life and work towards stated life goals." [de-identified] : Pt will continue to utilize coping skills to effectively plan and organize himself.  [de-identified] : Pt will continue building coping skills [de-identified] : Pt will identify problem solving skills and increase decision making.  [de-identified] : 8/1/24 [de-identified] : Work on improving financial wellbeing [de-identified] : 8/1/24 [de-identified] : Pt will create routine and work on habit building around his spending  [de-identified] : Psychodynamic therapy [FreeTextEntry5] : Pt continues to identify ineffective behaviors to support improved organization and planning and to reduce feelings of overwhelm and anxiety. [de-identified] : effective coping skills and observed independence

## 2024-03-04 NOTE — REASON FOR VISIT
[Continuity of care] : to ensure continuity of care [Access issues (e.g., transportation, impaired mobility, etc.)] : due to patient's access issues [Telehealth (audio & video) - Individual/Group] : This visit was provided via telehealth using real-time 2-way audio visual technology. [Continuing, patient not seen in-person within last 12 months (provide details below)] : Telehealth services are continuing, patient not seen in-person within last 12 months.  [Other Location: e.g. Home (Enter Location, City,State)___] : The provider was located at [unfilled]. [Home] : The patient, [unfilled], was located at home, [unfilled], at the time of the visit. [FreeTextEntry4] : 12:00 PM [Verbal consent obtained from patient/other participant(s)] : Verbal consent for telehealth/telephonic services obtained from patient/other participant(s) [FreeTextEntry3] : In-person session not clinically indicated. Pt prefers telehealth due to access issues.  [FreeTextEntry5] : 12:45 PM [Patient] : Patient

## 2024-03-06 ENCOUNTER — APPOINTMENT (OUTPATIENT)
Dept: PSYCHIATRY | Facility: CLINIC | Age: 24
End: 2024-03-06

## 2024-03-07 NOTE — PHYSICAL EXAM
[Cooperative] : cooperative [Euthymic] : euthymic [Full] : full [Clear] : clear [Linear/Goal Directed] : linear/goal directed [Average] : average [WNL] : within normal limits [] : No [2 - 2  to 4 times a month] : 2 - 2  to 4 times a month [3 - 7 to 9] : 3 - 7 to 9 [3 - Weekly] : 3 - Weekly [0 - Never] : 0 - Never [0 - No] : 0 - No [2 - Yes, but not in the last year] : 2 - Yes, but not in the last year [3] : 3 [4] : 4 [2] : 2 [FreeTextEntry1] : 10 [SchwartzScore] : 28

## 2024-03-07 NOTE — PLAN
[FreeTextEntry2] : Pt will continue with weekly individual psychotherapy.  [Psychodynamic Therapy] : Psychodynamic Therapy  [de-identified] : Pt arrived on time to individual psychotherapy session. Pt described recent developments in his dating life in detail. Pt and therapist further explored pt's negative thoughts and feelings towards himself and his appearance, discussing topics of self-esteem, and self-confidence. Pt left session in good emotional and behavioral control.  [Recommended Frequency of Visits: ____] : Recommended frequency of visits: [unfilled] [FreeTextEntry1] : Pt will continue twice weekly individual psychotherapy with writer, and attend next session next week.

## 2024-03-07 NOTE — DISCUSSION/SUMMARY
[Plan Review] : Plan Review [Able to manage surrounding demands and opportunities] : able to manage surrounding demands and opportunities [Adherent to treatment recommendations] : adherent to treatment recommendations [Articulate] : articulate [Attempting to realize their potential] : Attempting to realize their potential [Cognitively intact] : cognitively intact [Creative] : creative [Insightful] : insightful [Intelligent] : intelligent [Motivated and ready for change] : motivated and ready for change [Motivated to participate in treatment] : motivated to participate in treatment [Health literacy] : health literacy [Has vocational interests or hobbies] : has vocational interests or hobbies [Motivated to maintain or improve physical health] : motivated to maintain or improve physical health [Part of a supportive family] : part of a supportive family [Steady employment] : steady employment [Housing stability] : housing stability [High level of education] : high level of education [English fluency] : English fluency [Connected to healthcare] : connected to healthcare [Access to safe outdoor spaces] : access to safe outdoor spaces [Social supports] : social supports [FreeTextEntry2] : 8/1/24 [FreeTextEntry3] : 7/21/21 [Mental Health] : Mental Health [Continued - Progress made] : Continued - Progress made: [Initial] : Initial [___ times a week] : [unfilled] times a week [Improvement in symptoms as evidenced by:] : Improvement in symptoms as evidenced by: [Yes] : Yes [Supervisor (if needed)] : Supervisor [FreeTextEntry4] : "Be more productive in daily life and work towards stated life goals." [FreeTextEntry1] : Managing symptoms of anxiety [de-identified] : Pt will continue to utilize coping skills to effectively plan and organize himself.  [de-identified] : Pt will continue building coping skills [de-identified] : 8/1/24 [de-identified] : Pt will identify problem solving skills and increase decision making.  [de-identified] : Work on improving financial wellbeing [de-identified] : 8/1/24 [de-identified] : Pt will create routine and work on habit building around his spending  [FreeTextEntry5] : Pt continues to identify ineffective behaviors to support improved organization and planning and to reduce feelings of overwhelm and anxiety. [de-identified] : Psychodynamic therapy [de-identified] : effective coping skills and observed independence

## 2024-03-08 ENCOUNTER — APPOINTMENT (OUTPATIENT)
Dept: PSYCHIATRY | Facility: CLINIC | Age: 24
End: 2024-03-08

## 2024-03-08 DIAGNOSIS — F50.9 EATING DISORDER, UNSPECIFIED: ICD-10-CM

## 2024-03-08 DIAGNOSIS — F33.1 MAJOR DEPRESSIVE DISORDER, RECURRENT, MODERATE: ICD-10-CM

## 2024-03-08 DIAGNOSIS — F90.0 ATTENTION-DEFICIT HYPERACTIVITY DISORDER, PREDOMINANTLY INATTENTIVE TYPE: ICD-10-CM

## 2024-03-08 DIAGNOSIS — F41.1 GENERALIZED ANXIETY DISORDER: ICD-10-CM

## 2024-03-13 ENCOUNTER — APPOINTMENT (OUTPATIENT)
Dept: PSYCHIATRY | Facility: CLINIC | Age: 24
End: 2024-03-13

## 2024-03-13 NOTE — DISCUSSION/SUMMARY
[Able to manage surrounding demands and opportunities] : able to manage surrounding demands and opportunities [Plan Review] : Plan Review [Adherent to treatment recommendations] : adherent to treatment recommendations [Articulate] : articulate [Attempting to realize their potential] : Attempting to realize their potential [Cognitively intact] : cognitively intact [Insightful] : insightful [Creative] : creative [Intelligent] : intelligent [Motivated to participate in treatment] : motivated to participate in treatment [Motivated and ready for change] : motivated and ready for change [Health literacy] : health literacy [Motivated to maintain or improve physical health] : motivated to maintain or improve physical health [Has vocational interests or hobbies] : has vocational interests or hobbies [Part of a supportive family] : part of a supportive family [Steady employment] : steady employment [Housing stability] : housing stability [English fluency] : English fluency [High level of education] : high level of education [Connected to healthcare] : connected to healthcare [Social supports] : social supports [Access to safe outdoor spaces] : access to safe outdoor spaces [FreeTextEntry2] : 8/1/24 [FreeTextEntry3] : 7/21/21 [Mental Health] : Mental Health [Continued - Progress made] : Continued - Progress made: [Initial] : Initial [___ times a week] : [unfilled] times a week [Improvement in symptoms as evidenced by:] : Improvement in symptoms as evidenced by: [Yes] : Yes [FreeTextEntry1] : Managing symptoms of anxiety [Supervisor (if needed)] : Supervisor [FreeTextEntry4] : "Be more productive in daily life and work towards stated life goals." [de-identified] : Pt will continue building coping skills [de-identified] : Pt will continue to utilize coping skills to effectively plan and organize himself.  [de-identified] : Pt will identify problem solving skills and increase decision making.  [de-identified] : 8/1/24 [de-identified] : Work on improving financial wellbeing [de-identified] : 8/1/24 [FreeTextEntry5] : Pt continues to identify ineffective behaviors to support improved organization and planning and to reduce feelings of overwhelm and anxiety. [de-identified] : Pt will create routine and work on habit building around his spending  [de-identified] : Psychodynamic therapy [de-identified] : effective coping skills and observed independence

## 2024-03-13 NOTE — PHYSICAL EXAM
[Cooperative] : cooperative [Euthymic] : euthymic [Full] : full [Clear] : clear [Linear/Goal Directed] : linear/goal directed [Average] : average [WNL] : within normal limits [] : No [2 - 2  to 4 times a month] : 2 - 2  to 4 times a month [3 - 7 to 9] : 3 - 7 to 9 [3 - Weekly] : 3 - Weekly [0 - No] : 0 - No [0 - Never] : 0 - Never [2 - Yes, but not in the last year] : 2 - Yes, but not in the last year [3] : 3 [4] : 4 [2] : 2 [FreeTextEntry1] : 1 [SchwartzScore] : 28

## 2024-03-13 NOTE — PLAN
[FreeTextEntry2] : Pt will continue with weekly individual psychotherapy.  [Psychodynamic Therapy] : Psychodynamic Therapy  [de-identified] : Pt arrived on time to individual psychotherapy session. Pt further shared updates around recent developments in his dating life. Pt and therapist collaboratively explored changes in pt'a relational functioning and metaprocessed therapeutic relationship. Therapist also provided pt with emergency resources for break in sessions over the next two weeks. Pt left session in good emotional and behavioral control.  [Recommended Frequency of Visits: ____] : Recommended frequency of visits: [unfilled] [FreeTextEntry1] : Pt will continue twice weekly individual psychotherapy with writer, and attend next session in two weeks.

## 2024-03-15 ENCOUNTER — APPOINTMENT (OUTPATIENT)
Dept: PSYCHIATRY | Facility: CLINIC | Age: 24
End: 2024-03-15

## 2024-03-20 ENCOUNTER — APPOINTMENT (OUTPATIENT)
Dept: PSYCHIATRY | Facility: CLINIC | Age: 24
End: 2024-03-20
Payer: COMMERCIAL

## 2024-03-20 ENCOUNTER — APPOINTMENT (OUTPATIENT)
Dept: PSYCHIATRY | Facility: CLINIC | Age: 24
End: 2024-03-20

## 2024-03-20 PROCEDURE — 99214 OFFICE O/P EST MOD 30 MIN: CPT | Mod: 95

## 2024-03-20 NOTE — END OF VISIT
[Time Spent: ___ minutes] : I have spent [unfilled] minutes of time on the encounter. Time spent excludes time spent by LMHC (Licensed Mental Health Counselor) during the encounter.

## 2024-03-20 NOTE — RISK ASSESSMENT
[No, patient denies ideation or behavior] : No, patient denies ideation or behavior [Yes] : Yes [No] : No [Low acute suicide risk] : Low acute suicide risk [Not clinically indicated] : Safety Plan completed/updated (for individuals at risk): Not clinically indicated [FreeTextEntry9] : Low acute risk. Passive SI with no plan or intent. No hx of attempts. Protective factors include family support, future orientation and connection to care.

## 2024-03-20 NOTE — SOCIAL HISTORY
[With Family] : lives with family [Unemployed] : unemployed [Never ] : never  [None] : none [High School] : high school [Yes] : yes [FreeTextEntry1] : Living with Mother and Father in separate homes.  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

## 2024-03-20 NOTE — PLAN
[Medication education provided] : Medication education provided. [Rationale for medication choices, possible risks/precautions, benefits, alternative treatment choices, and consequences of non-treatment discussed] : Rationale for medication choices, possible risks/precautions, benefits, alternative treatment choices, and consequences of non-treatment discussed with patient/family/caregiver  [Order(s) for medication placed in [facility] EHR] : Medication [FreeTextEntry4] : Recently started meds to address sxs of anxiety, depression, binge eating. [FreeTextEntry5] : PLAN: #) Continue fluoxetine 20mg PO QDaily  #) Decrease adderall ER 15mg to 10mg PO Qam (3/20/24) and monitor for tolerance and response. Will add a 5mg IR tablet (can take full tablet or half tablet PRN).   #) Continue I therapy #) F/U in one month

## 2024-03-20 NOTE — HISTORY OF PRESENT ILLNESS
[FreeTextEntry1] : Pt is seen and evaluated. Interval hx reviewed. He recently returned from spring break trip with family. He describes the experience as tiring - "driving all over [place] to visit family." Enjoying environmental justice class recently earned an A in this class. Continues to take adderall more consistently. Notes "more come down" with higher dose of adderall and slight increase in anxiety.  Effects last approx. 6 hours and include increase focus, ability to sustain attention, and ability to complete tasks.  Continues to take fluoxetine with good tolerance and response. Continues therapy weekly. Binge eating is "kind of inconsistent" but less pronounced when he takes adderall. He denies any other change from baseline.  Note: has been dx with adhd inattentive type, IZZY, and MDD with neuropsych testing in 2018. Prescribed Adderall 15mg by pediatrician, Dr. Gloria [phone]. Decrease fluoxetine from 40mg to 20mg PO Qdaily on 10/6/23.

## 2024-03-20 NOTE — REASON FOR VISIT
[Telehealth (audio & video) - Individual/Group] : This visit was provided via telehealth using real-time 2-way audio visual technology. [Other Location: e.g. Home (Enter Location, City,State)___] : The provider was located at [unfilled]. [Patient preference] : Patient preference. [Home] : The patient, [unfilled], was located at home, [unfilled], at the time of the visit. [Verbal consent obtained from patient/other participant(s)] : Verbal consent for telehealth/telephonic services obtained from patient/other participant(s) [Patient] : Patient [FreeTextEntry4] : 10:30a [FreeTextEntry5] : 11:00a [FreeTextEntry2] : 1/9/24 [FreeTextEntry1] : Psychiatric follow up

## 2024-03-20 NOTE — PHYSICAL EXAM
[Average] : average [Cooperative] : cooperative [Euthymic] : euthymic [Full] : full [Clear] : clear [Linear/Goal Directed] : linear/goal directed [None Reported] : none reported [None] : none [WNL] : within normal limits

## 2024-03-20 NOTE — DISCUSSION/SUMMARY
[FreeTextEntry1] : Patient is a 22yo  (black/), cis gender, heterosexual male domiciled with his father, rising sophomore at [employer], with no significant PMHx, and PPHx significant for ADHD, IZZY, MDD recurrent, currently prescribed Adderall 15mg PO TID by pediatrician, in psychotherapy with Linda Connell for many years. Patient does not have a hx of psych hospitalization, suicide attempts, or psych ER visits. Pt. is the only child of  parents. Stressors include interpersonal distress related to relationship with parents and GF. Patient endorsed inattentive sxs of ADHD, IZZY, and depression and presented for medication consultation in the context of worsening mood and increased anxiety in Jan 2023 - started on Fluoxetine with good tolerance and response. Dose decreased from 40mg to 20mg in October 2023 per pt request.  Time Spent: Reviewing chart, developing rapport, psychoeducation, diagnosis, medication mgmt, reviewed risks vs benefits of meds, indication, and potential adverse effects. Reviewed past ADHD diagnosis and treatment. Shared decision to decrease adderall ER 15mg to 10mg PO Qam (3/20/24) and monitor for tolerance and response. Will add a 5mg IR tablet (can take full tablet or half tablet PRN). Decision based on balancing efficacy and s/e.. NOTE:  Increased adderall ER 10mg to 15mg PO QDaily on 2/21/24. Shared decision to continue dose of fluoxetine given concerns about not having enough anxiety to motivate himself to get work/assignments done.  Name of PCP: Dr. Kimberley Gates Date of Last Physical Exam: 2019 (reminded to scheduled annual exam.) Date of Last Annual Labs: Cannot recall Annual Review of Systems Completed (Y/N): Y Tobacco Screening Completed (Y/N): Y (non-smoker)

## 2024-03-20 NOTE — PAST MEDICAL HISTORY
[FreeTextEntry1] : Patient has been in psychotherapy treatment since July 2018 with Linda Connell at the psychological center at [facility].  Patient terminated treatment there in May 2021.  Patient has also completed a neuropsychological testing in March 2018.  Patient was diagnosed with IZZY, ADHD, inattentive type, moderate, and MDD, recurring, moderate. Patient denies past psychiatric hospitalization.  Patient was prescribed Adderall 15 mg from pediatrician in August 2020. \par

## 2024-03-22 ENCOUNTER — APPOINTMENT (OUTPATIENT)
Dept: PSYCHIATRY | Facility: CLINIC | Age: 24
End: 2024-03-22

## 2024-03-27 ENCOUNTER — APPOINTMENT (OUTPATIENT)
Dept: PSYCHIATRY | Facility: CLINIC | Age: 24
End: 2024-03-27

## 2024-03-29 ENCOUNTER — APPOINTMENT (OUTPATIENT)
Dept: PSYCHIATRY | Facility: CLINIC | Age: 24
End: 2024-03-29

## 2024-04-03 ENCOUNTER — APPOINTMENT (OUTPATIENT)
Dept: PSYCHIATRY | Facility: CLINIC | Age: 24
End: 2024-04-03

## 2024-04-04 ENCOUNTER — APPOINTMENT (OUTPATIENT)
Dept: PSYCHIATRY | Facility: CLINIC | Age: 24
End: 2024-04-04

## 2024-04-10 ENCOUNTER — APPOINTMENT (OUTPATIENT)
Dept: PSYCHIATRY | Facility: CLINIC | Age: 24
End: 2024-04-10

## 2024-04-10 NOTE — DISCUSSION/SUMMARY
[Plan Review] : Plan Review [Able to manage surrounding demands and opportunities] : able to manage surrounding demands and opportunities [Adherent to treatment recommendations] : adherent to treatment recommendations [Articulate] : articulate [Attempting to realize their potential] : Attempting to realize their potential [Cognitively intact] : cognitively intact [Insightful] : insightful [Creative] : creative [Intelligent] : intelligent [Motivated to participate in treatment] : motivated to participate in treatment [Motivated and ready for change] : motivated and ready for change [Health literacy] : health literacy [Motivated to maintain or improve physical health] : motivated to maintain or improve physical health [Has vocational interests or hobbies] : has vocational interests or hobbies [Part of a supportive family] : part of a supportive family [Steady employment] : steady employment [Housing stability] : housing stability [High level of education] : high level of education [English fluency] : English fluency [Connected to healthcare] : connected to healthcare [Access to safe outdoor spaces] : access to safe outdoor spaces [Social supports] : social supports [FreeTextEntry2] : 8/1/24 [FreeTextEntry3] : 7/21/21 [Mental Health] : Mental Health [Continued - Progress made] : Continued - Progress made: [Initial] : Initial [___ times a week] : [unfilled] times a week [Improvement in symptoms as evidenced by:] : Improvement in symptoms as evidenced by: [Yes] : Yes [Supervisor (if needed)] : Supervisor [FreeTextEntry4] : "Be more productive in daily life and work towards stated life goals." [FreeTextEntry1] : Managing symptoms of anxiety [de-identified] : Pt will continue to utilize coping skills to effectively plan and organize himself.  [de-identified] : Pt will continue building coping skills [de-identified] : 8/1/24 [de-identified] : Pt will identify problem solving skills and increase decision making.  [de-identified] : Work on improving financial wellbeing [de-identified] : 8/1/24 [de-identified] : Pt will create routine and work on habit building around his spending  [FreeTextEntry5] : Pt continues to identify ineffective behaviors to support improved organization and planning and to reduce feelings of overwhelm and anxiety. [de-identified] : Psychodynamic therapy [de-identified] : effective coping skills and observed independence

## 2024-04-10 NOTE — DISCUSSION/SUMMARY
[Plan Review] : Plan Review [Able to manage surrounding demands and opportunities] : able to manage surrounding demands and opportunities [Adherent to treatment recommendations] : adherent to treatment recommendations [Articulate] : articulate [Attempting to realize their potential] : Attempting to realize their potential [Cognitively intact] : cognitively intact [Insightful] : insightful [Creative] : creative [Intelligent] : intelligent [Motivated to participate in treatment] : motivated to participate in treatment [Motivated and ready for change] : motivated and ready for change [Health literacy] : health literacy [Motivated to maintain or improve physical health] : motivated to maintain or improve physical health [Has vocational interests or hobbies] : has vocational interests or hobbies [Part of a supportive family] : part of a supportive family [Steady employment] : steady employment [Housing stability] : housing stability [High level of education] : high level of education [English fluency] : English fluency [Connected to healthcare] : connected to healthcare [Access to safe outdoor spaces] : access to safe outdoor spaces [Social supports] : social supports [FreeTextEntry2] : 8/1/24 [FreeTextEntry3] : 7/21/21 [Mental Health] : Mental Health [Continued - Progress made] : Continued - Progress made: [Initial] : Initial [___ times a week] : [unfilled] times a week [Improvement in symptoms as evidenced by:] : Improvement in symptoms as evidenced by: [Yes] : Yes [Supervisor (if needed)] : Supervisor [FreeTextEntry1] : Managing symptoms of anxiety [FreeTextEntry4] : "Be more productive in daily life and work towards stated life goals." [de-identified] : Pt will continue to utilize coping skills to effectively plan and organize himself.  [de-identified] : Pt will continue building coping skills [de-identified] : 8/1/24 [de-identified] : Pt will identify problem solving skills and increase decision making.  [de-identified] : Work on improving financial wellbeing [de-identified] : 8/1/24 [de-identified] : Pt will create routine and work on habit building around his spending  [FreeTextEntry5] : Pt continues to identify ineffective behaviors to support improved organization and planning and to reduce feelings of overwhelm and anxiety. [de-identified] : Psychodynamic therapy [de-identified] : effective coping skills and observed independence

## 2024-04-10 NOTE — ADDENDUM
[FreeTextEntry1] : Writer will review psychotherapy session in detail during supervision with community supervisor: Linda Connell, PhD Referred To Plastics For Closure Text (Leave Blank If You Do Not Want): After obtaining clear surgical margins the patient was sent to plastics for surgical repair.  The patient understands they will receive post-surgical care and follow-up from Dr. Aguayo.

## 2024-04-10 NOTE — PLAN
[FreeTextEntry2] : Pt will continue with weekly individual psychotherapy.  [Psychodynamic Therapy] : Psychodynamic Therapy  [de-identified] : Pt arrived on time to individual psychotherapy session. Pt shared positive outcome of his presentation from the week before and other updates related to his academics. Therapist and pt continued discussing pt's progress thusfar, further exploring and metaprocessing underlying contributors to pt's growth and change. Pt left session in good emotional and behavioral control.  [Recommended Frequency of Visits: ____] : Recommended frequency of visits: [unfilled] [FreeTextEntry1] : Pt will continue twice weekly individual psychotherapy with writer, and attend next session in two weeks.

## 2024-04-10 NOTE — DISCUSSION/SUMMARY
[Plan Review] : Plan Review [Able to manage surrounding demands and opportunities] : able to manage surrounding demands and opportunities [Adherent to treatment recommendations] : adherent to treatment recommendations [Articulate] : articulate [Attempting to realize their potential] : Attempting to realize their potential [Cognitively intact] : cognitively intact [Insightful] : insightful [Creative] : creative [Intelligent] : intelligent [Motivated to participate in treatment] : motivated to participate in treatment [Motivated and ready for change] : motivated and ready for change [Health literacy] : health literacy [Motivated to maintain or improve physical health] : motivated to maintain or improve physical health [Has vocational interests or hobbies] : has vocational interests or hobbies [Part of a supportive family] : part of a supportive family [Steady employment] : steady employment [Housing stability] : housing stability [High level of education] : high level of education [English fluency] : English fluency [Connected to healthcare] : connected to healthcare [Access to safe outdoor spaces] : access to safe outdoor spaces [Social supports] : social supports [FreeTextEntry2] : 8/1/24 [FreeTextEntry3] : 7/21/21 [Mental Health] : Mental Health [Continued - Progress made] : Continued - Progress made: [Initial] : Initial [___ times a week] : [unfilled] times a week [Improvement in symptoms as evidenced by:] : Improvement in symptoms as evidenced by: [Yes] : Yes [Supervisor (if needed)] : Supervisor [FreeTextEntry1] : Managing symptoms of anxiety [FreeTextEntry4] : "Be more productive in daily life and work towards stated life goals." [de-identified] : Pt will continue to utilize coping skills to effectively plan and organize himself.  [de-identified] : Pt will continue building coping skills [de-identified] : 8/1/24 [de-identified] : Pt will identify problem solving skills and increase decision making.  [de-identified] : Work on improving financial wellbeing [de-identified] : 8/1/24 [de-identified] : Pt will create routine and work on habit building around his spending  [FreeTextEntry5] : Pt continues to identify ineffective behaviors to support improved organization and planning and to reduce feelings of overwhelm and anxiety. [de-identified] : Psychodynamic therapy [de-identified] : effective coping skills and observed independence

## 2024-04-10 NOTE — PLAN
[FreeTextEntry2] : Pt will continue with weekly individual psychotherapy.  [Psychodynamic Therapy] : Psychodynamic Therapy  [de-identified] : Pt arrived on time to individual psychotherapy session. Pt shared his feelings of anxiety of public speaking due to an upcoming presentation at school. Therapist engaged pt in a practice role play and collaboratively meta-processed pt's experience of this exercise after. Pt left session in good emotional and behavioral control.  [Recommended Frequency of Visits: ____] : Recommended frequency of visits: [unfilled] [FreeTextEntry1] : Pt will continue twice weekly individual psychotherapy with writer, and attend next session in two weeks.

## 2024-04-12 ENCOUNTER — APPOINTMENT (OUTPATIENT)
Dept: PSYCHIATRY | Facility: CLINIC | Age: 24
End: 2024-04-12

## 2024-04-12 NOTE — DISCUSSION/SUMMARY
[Plan Review] : Plan Review [Able to manage surrounding demands and opportunities] : able to manage surrounding demands and opportunities [Adherent to treatment recommendations] : adherent to treatment recommendations [Articulate] : articulate [Attempting to realize their potential] : Attempting to realize their potential [Cognitively intact] : cognitively intact [Insightful] : insightful [Creative] : creative [Intelligent] : intelligent [Motivated to participate in treatment] : motivated to participate in treatment [Motivated and ready for change] : motivated and ready for change [Health literacy] : health literacy [Motivated to maintain or improve physical health] : motivated to maintain or improve physical health [Has vocational interests or hobbies] : has vocational interests or hobbies [Part of a supportive family] : part of a supportive family [Steady employment] : steady employment [Housing stability] : housing stability [High level of education] : high level of education [English fluency] : English fluency [Connected to healthcare] : connected to healthcare [Access to safe outdoor spaces] : access to safe outdoor spaces [Social supports] : social supports [FreeTextEntry2] : 8/1/24 [FreeTextEntry3] : 7/21/21 [Mental Health] : Mental Health [Continued - Progress made] : Continued - Progress made: [Initial] : Initial [___ times a week] : [unfilled] times a week [Improvement in symptoms as evidenced by:] : Improvement in symptoms as evidenced by: [Yes] : Yes [Supervisor (if needed)] : Supervisor [FreeTextEntry1] : Managing symptoms of anxiety [FreeTextEntry4] : "Be more productive in daily life and work towards stated life goals." [de-identified] : Pt will continue to utilize coping skills to effectively plan and organize himself.  [de-identified] : Pt will continue building coping skills [de-identified] : 8/1/24 [de-identified] : Pt will identify problem solving skills and increase decision making.  [de-identified] : Work on improving financial wellbeing [de-identified] : 8/1/24 [de-identified] : Pt will create routine and work on habit building around his spending  [FreeTextEntry5] : Pt continues to identify ineffective behaviors to support improved organization and planning and to reduce feelings of overwhelm and anxiety. [de-identified] : Psychodynamic therapy [de-identified] : effective coping skills and observed independence

## 2024-04-12 NOTE — PLAN
[FreeTextEntry2] : Pt will continue with weekly individual psychotherapy.  [Psychodynamic Therapy] : Psychodynamic Therapy  [Recommended Frequency of Visits: ____] : Recommended frequency of visits: [unfilled] [de-identified] : Pt arrived on time to individual psychotherapy session. Pt described his work day and positive feelings of related to ongoing conversation around his progress thus far. Pt and therapist collaboratively explored pt's ongoing goals for therapy and areas to focus on going forward. Pt left session in good emotional and behavioral control.  [FreeTextEntry1] : Pt will continue twice weekly individual psychotherapy with writer, and attend next session in two weeks.

## 2024-04-17 ENCOUNTER — APPOINTMENT (OUTPATIENT)
Dept: PSYCHIATRY | Facility: CLINIC | Age: 24
End: 2024-04-17
Payer: COMMERCIAL

## 2024-04-17 PROCEDURE — 99214 OFFICE O/P EST MOD 30 MIN: CPT | Mod: 95

## 2024-04-17 RX ORDER — DEXTROAMPHETAMINE SACCHARATE, AMPHETAMINE ASPARTATE, DEXTROAMPHETAMINE SULFATE AND AMPHETAMINE SULFATE 1.25; 1.25; 1.25; 1.25 MG/1; MG/1; MG/1; MG/1
5 TABLET ORAL
Qty: 30 | Refills: 0 | Status: ACTIVE | COMMUNITY
Start: 2024-03-20 | End: 1900-01-01

## 2024-04-17 RX ORDER — DEXTROAMPHETAMINE SACCHARATE, AMPHETAMINE ASPARTATE MONOHYDRATE, DEXTROAMPHETAMINE SULFATE AND AMPHETAMINE SULFATE 2.5; 2.5; 2.5; 2.5 MG/1; MG/1; MG/1; MG/1
10 CAPSULE, EXTENDED RELEASE ORAL DAILY
Qty: 30 | Refills: 0 | Status: ACTIVE | COMMUNITY
Start: 2024-04-02 | End: 1900-01-01

## 2024-04-17 RX ORDER — FLUOXETINE HYDROCHLORIDE 20 MG/1
20 CAPSULE ORAL
Qty: 30 | Refills: 1 | Status: DISCONTINUED | COMMUNITY
Start: 2023-10-06 | End: 2024-04-17

## 2024-04-17 NOTE — HISTORY OF PRESENT ILLNESS
[FreeTextEntry1] : Pt is seen and evaluated. Interval hx reviewed. He has been taking adderall more consistently with good tolerance and response. Improved academic performance this semester. Dose of 10mg works sufficiently without much side effect burden. Continues to engage in binge eating. He denies any other change from baseline.  Note: has been dx with adhd inattentive type, IZZY, and MDD with neuropsych testing in 2018. Prescribed Adderall 15mg by pediatrician, Dr. Gloria [phone]. Decrease fluoxetine from 40mg to 20mg PO Qdaily on 10/6/23.

## 2024-04-17 NOTE — REASON FOR VISIT
[Telehealth (audio & video) - Individual/Group] : This visit was provided via telehealth using real-time 2-way audio visual technology. [Patient preference] : Patient preference. [Other Location: e.g. Home (Enter Location, City,State)___] : The provider was located at [unfilled]. [Home] : The patient, [unfilled], was located at home, [unfilled], at the time of the visit. [Verbal consent obtained from patient/other participant(s)] : Verbal consent for telehealth/telephonic services obtained from patient/other participant(s) [Patient] : Patient [FreeTextEntry4] : 10:00a [FreeTextEntry5] : 10:30a [FreeTextEntry2] : 1/9/24 [FreeTextEntry1] : Psychiatric follow up

## 2024-04-17 NOTE — PAST MEDICAL HISTORY
[FreeTextEntry1] : Patient has been in psychotherapy treatment since July 2018 with Linda Connell at the psychological center at [facility].  Patient terminated treatment there in May 2021.  Patient has also completed a neuropsychological testing in March 2018.  Patient was diagnosed with IZZY, ADHD, inattentive type, moderate, and MDD, recurring, moderate. Patient denies past psychiatric hospitalization.  Patient was prescribed Adderall 15 mg from pediatrician in August 2020. \par

## 2024-04-17 NOTE — RISK ASSESSMENT
[No, patient denies ideation or behavior] : No, patient denies ideation or behavior [Yes] : Yes [Low acute suicide risk] : Low acute suicide risk [No] : No [Not clinically indicated] : Safety Plan completed/updated (for individuals at risk): Not clinically indicated [FreeTextEntry9] : Low acute risk. Passive SI with no plan or intent. No hx of attempts. Protective factors include family support, future orientation and connection to care.

## 2024-04-17 NOTE — PLAN
[Medication education provided] : Medication education provided. [Rationale for medication choices, possible risks/precautions, benefits, alternative treatment choices, and consequences of non-treatment discussed] : Rationale for medication choices, possible risks/precautions, benefits, alternative treatment choices, and consequences of non-treatment discussed with patient/family/caregiver  [Order(s) for medication placed in [facility] EHR] : Medication [FreeTextEntry4] : Recently started meds to address sxs of anxiety, depression, binge eating. [FreeTextEntry5] : PLAN: #) Increase fluoxetine 20mg to 40mg PO QDaily  #) Continue adderall ER 10mg PO Qam (dose decreased from 15mg to 10mg on 3/20/24) and monitor for tolerance and response. Will add a 5mg IR tablet (can take full tablet or half tablet PRN).   #) Continue I therapy #) F/U in one month

## 2024-04-17 NOTE — DISCUSSION/SUMMARY
[FreeTextEntry1] : Patient is a 24yo  (black/), cis gender, heterosexual male domiciled with his father, rising sophomore at [employer], with no significant PMHx, and PPHx significant for ADHD, IZZY, MDD recurrent, currently prescribed Adderall 15mg PO TID by pediatrician, in psychotherapy with Linda Connell for many years. Patient does not have a hx of psych hospitalization, suicide attempts, or psych ER visits. Pt. is the only child of  parents. Stressors include interpersonal distress related to relationship with parents and GF. Patient endorsed inattentive sxs of ADHD, IZZY, and depression and presented for medication consultation in the context of worsening mood and increased anxiety in Jan 2023 - started on Fluoxetine with good tolerance and response. Dose decreased from 40mg to 20mg in October 2023 per pt request.  Time Spent: Reviewing chart, developing rapport, psychoeducation, diagnosis, medication mgmt, reviewed risks vs benefits of meds, indication, and potential adverse effects. Shared decision to continue adderall ER 10mg PO Qam (3/20/24) and monitor for tolerance and response. Will add a 5mg IR tablet (can take full tablet or half tablet PRN). Decision based on balancing efficacy and s/e.. NOTE:  Increased adderall ER 10mg to 15mg PO QDaily on 2/21/24 but pt experienced more s/e. Shared decision to continue dose of fluoxetine given concerns about not having enough anxiety to motivate himself to get work/assignments done.  Name of PCP: Dr. Kimberley Gates Date of Last Physical Exam: 2019 (reminded to scheduled annual exam.) Date of Last Annual Labs: Cannot recall Annual Review of Systems Completed (Y/N): Y Tobacco Screening Completed (Y/N): Y (non-smoker)

## 2024-04-19 ENCOUNTER — APPOINTMENT (OUTPATIENT)
Dept: PSYCHIATRY | Facility: CLINIC | Age: 24
End: 2024-04-19

## 2024-04-23 NOTE — PLAN
[FreeTextEntry2] : Pt will continue with weekly individual psychotherapy.  [Psychodynamic Therapy] : Psychodynamic Therapy  [de-identified] : Pt arrived on time to individual psychotherapy session. Pt and therapist reviewed pt's experience of last session, particularly exposure to difficult/negative affect. Pt acknowledged importance of affective processing and worked through his "mental block' against his own emotions. Pt left session in good emotional and behavioral control.  [Recommended Frequency of Visits: ____] : Recommended frequency of visits: [unfilled] [FreeTextEntry1] : Pt will continue twice weekly individual psychotherapy with writer, and attend next session in two weeks.

## 2024-04-23 NOTE — PLAN
[FreeTextEntry2] : Pt will continue with weekly individual psychotherapy.  [Psychodynamic Therapy] : Psychodynamic Therapy  [de-identified] : Pt arrived on time to individual psychotherapy session. Pt reported stress and feelings of guilt and shame towards himself as a result of his spending habits. Pt and therapist discussed pt's cyclical patterns of behavior and collaboratively worked to identify underlying roots. Pt attempted to sit with difficult feelings. Pt left session in good emotional and behavioral control.  [Recommended Frequency of Visits: ____] : Recommended frequency of visits: [unfilled] [FreeTextEntry1] : Pt will continue twice weekly individual psychotherapy with writer, and attend next session in two weeks.

## 2024-04-23 NOTE — PHYSICAL EXAM
[Cooperative] : cooperative [Euthymic] : euthymic [Full] : full [Clear] : clear [Linear/Goal Directed] : linear/goal directed [Average] : average [WNL] : within normal limits [] : No [2 - 2  to 4 times a month] : 2 - 2  to 4 times a month [3 - 7 to 9] : 3 - 7 to 9 [3 - Weekly] : 3 - Weekly [0 - Never] : 0 - Never [0 - No] : 0 - No [2 - Yes, but not in the last year] : 2 - Yes, but not in the last year [3] : 3 [4] : 4 [2] : 2 [SchwartzScore] : 28 [FreeTextEntry1] : 10

## 2024-04-23 NOTE — DISCUSSION/SUMMARY
[Plan Review] : Plan Review [Able to manage surrounding demands and opportunities] : able to manage surrounding demands and opportunities [Adherent to treatment recommendations] : adherent to treatment recommendations [Articulate] : articulate [Attempting to realize their potential] : Attempting to realize their potential [Cognitively intact] : cognitively intact [Insightful] : insightful [Creative] : creative [Intelligent] : intelligent [Motivated to participate in treatment] : motivated to participate in treatment [Motivated and ready for change] : motivated and ready for change [Health literacy] : health literacy [Motivated to maintain or improve physical health] : motivated to maintain or improve physical health [Has vocational interests or hobbies] : has vocational interests or hobbies [Part of a supportive family] : part of a supportive family [Steady employment] : steady employment [Housing stability] : housing stability [High level of education] : high level of education [English fluency] : English fluency [Connected to healthcare] : connected to healthcare [Access to safe outdoor spaces] : access to safe outdoor spaces [Social supports] : social supports [FreeTextEntry2] : 8/1/24 [FreeTextEntry3] : 7/21/21 [Mental Health] : Mental Health [Continued - Progress made] : Continued - Progress made: [Initial] : Initial [___ times a week] : [unfilled] times a week [Improvement in symptoms as evidenced by:] : Improvement in symptoms as evidenced by: [Yes] : Yes [Supervisor (if needed)] : Supervisor [FreeTextEntry1] : Managing symptoms of anxiety [FreeTextEntry4] : "Be more productive in daily life and work towards stated life goals." [de-identified] : Pt will continue building coping skills [de-identified] : Pt will continue to utilize coping skills to effectively plan and organize himself.  [de-identified] : 8/1/24 [de-identified] : Pt will identify problem solving skills and increase decision making.  [de-identified] : Work on improving financial wellbeing [de-identified] : 8/1/24 [de-identified] : Pt will create routine and work on habit building around his spending  [FreeTextEntry5] : Pt continues to identify ineffective behaviors to support improved organization and planning and to reduce feelings of overwhelm and anxiety. [de-identified] : Psychodynamic therapy [de-identified] : effective coping skills and observed independence

## 2024-04-23 NOTE — DISCUSSION/SUMMARY
[Plan Review] : Plan Review [Able to manage surrounding demands and opportunities] : able to manage surrounding demands and opportunities [Adherent to treatment recommendations] : adherent to treatment recommendations [Articulate] : articulate [Attempting to realize their potential] : Attempting to realize their potential [Cognitively intact] : cognitively intact [Insightful] : insightful [Creative] : creative [Intelligent] : intelligent [Motivated to participate in treatment] : motivated to participate in treatment [Motivated and ready for change] : motivated and ready for change [Health literacy] : health literacy [Motivated to maintain or improve physical health] : motivated to maintain or improve physical health [Has vocational interests or hobbies] : has vocational interests or hobbies [Part of a supportive family] : part of a supportive family [Steady employment] : steady employment [Housing stability] : housing stability [High level of education] : high level of education [English fluency] : English fluency [Connected to healthcare] : connected to healthcare [Access to safe outdoor spaces] : access to safe outdoor spaces [Social supports] : social supports [FreeTextEntry3] : 7/21/21 [FreeTextEntry2] : 8/1/24 [Mental Health] : Mental Health [Continued - Progress made] : Continued - Progress made: [Initial] : Initial [___ times a week] : [unfilled] times a week [Improvement in symptoms as evidenced by:] : Improvement in symptoms as evidenced by: [Yes] : Yes [Supervisor (if needed)] : Supervisor [FreeTextEntry1] : Managing symptoms of anxiety [FreeTextEntry4] : "Be more productive in daily life and work towards stated life goals." [de-identified] : Pt will continue to utilize coping skills to effectively plan and organize himself.  [de-identified] : Pt will continue building coping skills [de-identified] : Pt will identify problem solving skills and increase decision making.  [de-identified] : 8/1/24 [de-identified] : Work on improving financial wellbeing [de-identified] : 8/1/24 [de-identified] : Pt will create routine and work on habit building around his spending  [FreeTextEntry5] : Pt continues to identify ineffective behaviors to support improved organization and planning and to reduce feelings of overwhelm and anxiety. [de-identified] : Psychodynamic therapy [de-identified] : effective coping skills and observed independence

## 2024-04-24 ENCOUNTER — APPOINTMENT (OUTPATIENT)
Dept: PSYCHIATRY | Facility: CLINIC | Age: 24
End: 2024-04-24

## 2024-04-26 ENCOUNTER — APPOINTMENT (OUTPATIENT)
Dept: PSYCHIATRY | Facility: CLINIC | Age: 24
End: 2024-04-26

## 2024-05-01 ENCOUNTER — APPOINTMENT (OUTPATIENT)
Dept: PSYCHIATRY | Facility: CLINIC | Age: 24
End: 2024-05-01

## 2024-05-02 ENCOUNTER — APPOINTMENT (OUTPATIENT)
Dept: PSYCHIATRY | Facility: CLINIC | Age: 24
End: 2024-05-02

## 2024-05-03 ENCOUNTER — APPOINTMENT (OUTPATIENT)
Dept: PSYCHIATRY | Facility: CLINIC | Age: 24
End: 2024-05-03

## 2024-05-06 NOTE — PLAN
[FreeTextEntry2] : Pt will continue with weekly individual psychotherapy.  [Psychodynamic Therapy] : Psychodynamic Therapy  [de-identified] : Pt arrived on time to individual psychotherapy session. Pt reported on events related to seeing his ex-girlfriend recently at school and underlying feelings stemming from these events. Therapist further explored patterns in pt's interpersonal functioning. Pt left session in good emotional and behavioral control.  [Recommended Frequency of Visits: ____] : Recommended frequency of visits: [unfilled] [FreeTextEntry1] : Pt will continue twice weekly individual psychotherapy with writer, and attend next session in two weeks.

## 2024-05-06 NOTE — DISCUSSION/SUMMARY
[Plan Review] : Plan Review [Able to manage surrounding demands and opportunities] : able to manage surrounding demands and opportunities [Adherent to treatment recommendations] : adherent to treatment recommendations [Articulate] : articulate [Attempting to realize their potential] : Attempting to realize their potential [Cognitively intact] : cognitively intact [Insightful] : insightful [Creative] : creative [Intelligent] : intelligent [Motivated to participate in treatment] : motivated to participate in treatment [Motivated and ready for change] : motivated and ready for change [Health literacy] : health literacy [Motivated to maintain or improve physical health] : motivated to maintain or improve physical health [Has vocational interests or hobbies] : has vocational interests or hobbies [Part of a supportive family] : part of a supportive family [Steady employment] : steady employment [Housing stability] : housing stability [High level of education] : high level of education [English fluency] : English fluency [Connected to healthcare] : connected to healthcare [Access to safe outdoor spaces] : access to safe outdoor spaces [Social supports] : social supports [FreeTextEntry2] : 8/1/24 [FreeTextEntry3] : 7/21/21 [Mental Health] : Mental Health [Continued - Progress made] : Continued - Progress made: [Initial] : Initial [___ times a week] : [unfilled] times a week [Improvement in symptoms as evidenced by:] : Improvement in symptoms as evidenced by: [Yes] : Yes [Supervisor (if needed)] : Supervisor [FreeTextEntry1] : Managing symptoms of anxiety [FreeTextEntry4] : "Be more productive in daily life and work towards stated life goals." [de-identified] : Pt will continue to utilize coping skills to effectively plan and organize himself.  [de-identified] : Pt will continue building coping skills [de-identified] : 8/1/24 [de-identified] : Pt will identify problem solving skills and increase decision making.  [de-identified] : Work on improving financial wellbeing [de-identified] : 8/1/24 [de-identified] : Pt will create routine and work on habit building around his spending  [FreeTextEntry5] : Pt continues to identify ineffective behaviors to support improved organization and planning and to reduce feelings of overwhelm and anxiety. [de-identified] : Psychodynamic therapy [de-identified] : effective coping skills and observed independence

## 2024-05-08 ENCOUNTER — APPOINTMENT (OUTPATIENT)
Dept: PSYCHIATRY | Facility: CLINIC | Age: 24
End: 2024-05-08

## 2024-05-09 ENCOUNTER — APPOINTMENT (OUTPATIENT)
Dept: PSYCHIATRY | Facility: CLINIC | Age: 24
End: 2024-05-09

## 2024-05-14 ENCOUNTER — APPOINTMENT (OUTPATIENT)
Dept: PSYCHIATRY | Facility: CLINIC | Age: 24
End: 2024-05-14

## 2024-05-15 ENCOUNTER — APPOINTMENT (OUTPATIENT)
Dept: PSYCHIATRY | Facility: CLINIC | Age: 24
End: 2024-05-15

## 2024-05-15 NOTE — DISCUSSION/SUMMARY
[Plan Review] : Plan Review [Able to manage surrounding demands and opportunities] : able to manage surrounding demands and opportunities [Adherent to treatment recommendations] : adherent to treatment recommendations [Articulate] : articulate [Attempting to realize their potential] : Attempting to realize their potential [Cognitively intact] : cognitively intact [Insightful] : insightful [Creative] : creative [Intelligent] : intelligent [Motivated to participate in treatment] : motivated to participate in treatment [Motivated and ready for change] : motivated and ready for change [Health literacy] : health literacy [Motivated to maintain or improve physical health] : motivated to maintain or improve physical health [Has vocational interests or hobbies] : has vocational interests or hobbies [Part of a supportive family] : part of a supportive family [Steady employment] : steady employment [Housing stability] : housing stability [High level of education] : high level of education [English fluency] : English fluency [Connected to healthcare] : connected to healthcare [Access to safe outdoor spaces] : access to safe outdoor spaces [Social supports] : social supports [FreeTextEntry2] : 8/1/24 [FreeTextEntry3] : 7/21/21 [Mental Health] : Mental Health [Continued - Progress made] : Continued - Progress made: [Initial] : Initial [___ times a week] : [unfilled] times a week [Improvement in symptoms as evidenced by:] : Improvement in symptoms as evidenced by: [Yes] : Yes [Supervisor (if needed)] : Supervisor [FreeTextEntry1] : Managing symptoms of anxiety [FreeTextEntry4] : "Be more productive in daily life and work towards stated life goals." [de-identified] : Pt will continue to utilize coping skills to effectively plan and organize himself.  [de-identified] : Pt will continue building coping skills [de-identified] : 8/1/24 [de-identified] : Pt will identify problem solving skills and increase decision making.  [de-identified] : Work on improving financial wellbeing [de-identified] : Pt will create routine and work on habit building around his spending  [de-identified] : 8/1/24 [FreeTextEntry5] : Pt continues to identify ineffective behaviors to support improved organization and planning and to reduce feelings of overwhelm and anxiety. [de-identified] : Psychodynamic therapy [de-identified] : effective coping skills and observed independence

## 2024-05-15 NOTE — PLAN
[FreeTextEntry2] : Pt will continue with weekly individual psychotherapy.  [Psychodynamic Therapy] : Psychodynamic Therapy  [de-identified] : Pt arrived on time to individual psychotherapy session. Pt reported continuing challenges with completing his final assignment for class. Pt and therapist collaboratively problem solved and practiced alternate strategies to improve focus in vivo during session. Pt left session in good emotional and behavioral control.  [Recommended Frequency of Visits: ____] : Recommended frequency of visits: [unfilled] [FreeTextEntry1] : Pt will continue twice weekly individual psychotherapy with writer, and attend next session in two weeks.

## 2024-05-15 NOTE — PLAN
[Psychodynamic Therapy] : Psychodynamic Therapy  [Recommended Frequency of Visits: ____] : Recommended frequency of visits: [unfilled] [FreeTextEntry2] : Pt will continue with weekly individual psychotherapy.  [de-identified] : Pt arrived on time to individual psychotherapy session. Pt reported on the upcoming end of academic semester, and his thoughts and feelings related to grades, finals, and not graduating with his peers. Therapist provided validation and empathic listening. Pt left session in good emotional and behavioral control.  [FreeTextEntry1] : Pt will continue twice weekly individual psychotherapy with writer, and attend next session in two weeks.

## 2024-05-15 NOTE — PHYSICAL EXAM
[Cooperative] : cooperative [Euthymic] : euthymic [Full] : full [Clear] : clear [Linear/Goal Directed] : linear/goal directed [Average] : average [WNL] : within normal limits [2 - 2  to 4 times a month] : 2 - 2  to 4 times a month [3 - 7 to 9] : 3 - 7 to 9 [3 - Weekly] : 3 - Weekly [0 - Never] : 0 - Never [0 - No] : 0 - No [2 - Yes, but not in the last year] : 2 - Yes, but not in the last year [3] : 3 [4] : 4 [2] : 2 [] : No [FreeTextEntry1] : 10 [SchwartzScore] : 28

## 2024-05-15 NOTE — DISCUSSION/SUMMARY
[Plan Review] : Plan Review [Able to manage surrounding demands and opportunities] : able to manage surrounding demands and opportunities [Adherent to treatment recommendations] : adherent to treatment recommendations [Articulate] : articulate [Attempting to realize their potential] : Attempting to realize their potential [Cognitively intact] : cognitively intact [Insightful] : insightful [Creative] : creative [Intelligent] : intelligent [Motivated to participate in treatment] : motivated to participate in treatment [Motivated and ready for change] : motivated and ready for change [Health literacy] : health literacy [Motivated to maintain or improve physical health] : motivated to maintain or improve physical health [Has vocational interests or hobbies] : has vocational interests or hobbies [Part of a supportive family] : part of a supportive family [Steady employment] : steady employment [Housing stability] : housing stability [High level of education] : high level of education [English fluency] : English fluency [Connected to healthcare] : connected to healthcare [Access to safe outdoor spaces] : access to safe outdoor spaces [Social supports] : social supports [FreeTextEntry2] : 8/1/24 [FreeTextEntry3] : 7/21/21 [Mental Health] : Mental Health [Continued - Progress made] : Continued - Progress made: [Initial] : Initial [___ times a week] : [unfilled] times a week [Improvement in symptoms as evidenced by:] : Improvement in symptoms as evidenced by: [Yes] : Yes [Supervisor (if needed)] : Supervisor [FreeTextEntry1] : Managing symptoms of anxiety [FreeTextEntry4] : "Be more productive in daily life and work towards stated life goals." [de-identified] : Pt will continue to utilize coping skills to effectively plan and organize himself.  [de-identified] : Pt will continue building coping skills [de-identified] : 8/1/24 [de-identified] : Pt will identify problem solving skills and increase decision making.  [de-identified] : Work on improving financial wellbeing [de-identified] : 8/1/24 [de-identified] : Pt will create routine and work on habit building around his spending  [FreeTextEntry5] : Pt continues to identify ineffective behaviors to support improved organization and planning and to reduce feelings of overwhelm and anxiety. [de-identified] : Psychodynamic therapy [de-identified] : effective coping skills and observed independence

## 2024-05-15 NOTE — PLAN
[FreeTextEntry2] : Pt will continue with weekly individual psychotherapy.  [Psychodynamic Therapy] : Psychodynamic Therapy  [de-identified] : Pt arrived on time to individual psychotherapy session. Pt shared about recent conversations with his family and the influence of that on his life, his autonomy, and sense of self. Pt and therapist also continued discussing pt's emotional avoidance and its resulting impact as it relates to relationships and life goals. Pt left session in good emotional and behavioral control.  [Recommended Frequency of Visits: ____] : Recommended frequency of visits: [unfilled] [FreeTextEntry1] : Pt will continue twice weekly individual psychotherapy with writer, and attend next session in two weeks.

## 2024-05-15 NOTE — REASON FOR VISIT
[Access issues (e.g., transportation, impaired mobility, etc.)] : due to patient's access issues [Continuity of care] : to ensure continuity of care [Continuing, patient not seen in-person within last 12 months (provide details below)] : Telehealth services are continuing, patient not seen in-person within last 12 months.  [Telehealth (audio & video) - Individual/Group] : This visit was provided via telehealth using real-time 2-way audio visual technology. [Other Location: e.g. Home (Enter Location, City,State)___] : The provider was located at [unfilled]. [Home] : The patient, [unfilled], was located at home, [unfilled], at the time of the visit. [Verbal consent obtained from patient/other participant(s)] : Verbal consent for telehealth/telephonic services obtained from patient/other participant(s) [Patient] : Patient [FreeTextEntry4] : 12:00 PM [FreeTextEntry5] : 12:45 PM [FreeTextEntry3] : In-person session not clinically indicated. Pt prefers telehealth due to access issues.

## 2024-05-15 NOTE — PLAN
[FreeTextEntry2] : Pt will continue with weekly individual psychotherapy.  [Psychodynamic Therapy] : Psychodynamic Therapy  [de-identified] : Pt arrived on time to individual psychotherapy session. Pt and therapist met in person for the first time. This was discussed and collaboratively metaprocessed in depth. Pt and therapist also continued discussing pt's difficulty identifying his emotions and his tendency to engage in avoidance. Pt left session in good emotional and behavioral control.  [Recommended Frequency of Visits: ____] : Recommended frequency of visits: [unfilled] [FreeTextEntry1] : Pt will continue twice weekly individual psychotherapy with writer, and attend next session in two weeks.

## 2024-05-15 NOTE — DISCUSSION/SUMMARY
[Plan Review] : Plan Review [Able to manage surrounding demands and opportunities] : able to manage surrounding demands and opportunities [Adherent to treatment recommendations] : adherent to treatment recommendations [Articulate] : articulate [Attempting to realize their potential] : Attempting to realize their potential [Cognitively intact] : cognitively intact [Insightful] : insightful [Creative] : creative [Intelligent] : intelligent [Motivated to participate in treatment] : motivated to participate in treatment [Motivated and ready for change] : motivated and ready for change [Health literacy] : health literacy [Motivated to maintain or improve physical health] : motivated to maintain or improve physical health [Has vocational interests or hobbies] : has vocational interests or hobbies [Part of a supportive family] : part of a supportive family [Steady employment] : steady employment [Housing stability] : housing stability [High level of education] : high level of education [English fluency] : English fluency [Connected to healthcare] : connected to healthcare [Access to safe outdoor spaces] : access to safe outdoor spaces [Social supports] : social supports [FreeTextEntry2] : 8/1/24 [FreeTextEntry3] : 7/21/21 [Mental Health] : Mental Health [Continued - Progress made] : Continued - Progress made: [Initial] : Initial [___ times a week] : [unfilled] times a week [Improvement in symptoms as evidenced by:] : Improvement in symptoms as evidenced by: [Yes] : Yes [Supervisor (if needed)] : Supervisor [FreeTextEntry1] : Managing symptoms of anxiety [FreeTextEntry4] : "Be more productive in daily life and work towards stated life goals." [de-identified] : Pt will continue to utilize coping skills to effectively plan and organize himself.  [de-identified] : Pt will continue building coping skills [de-identified] : 8/1/24 [de-identified] : Pt will identify problem solving skills and increase decision making.  [de-identified] : 8/1/24 [de-identified] : Work on improving financial wellbeing [de-identified] : Pt will create routine and work on habit building around his spending  [FreeTextEntry5] : Pt continues to identify ineffective behaviors to support improved organization and planning and to reduce feelings of overwhelm and anxiety. [de-identified] : Psychodynamic therapy [de-identified] : effective coping skills and observed independence

## 2024-05-15 NOTE — DISCUSSION/SUMMARY
[Plan Review] : Plan Review [Able to manage surrounding demands and opportunities] : able to manage surrounding demands and opportunities [Adherent to treatment recommendations] : adherent to treatment recommendations [Articulate] : articulate [Attempting to realize their potential] : Attempting to realize their potential [Cognitively intact] : cognitively intact [Insightful] : insightful [Creative] : creative [Intelligent] : intelligent [Motivated to participate in treatment] : motivated to participate in treatment [Motivated and ready for change] : motivated and ready for change [Health literacy] : health literacy [Motivated to maintain or improve physical health] : motivated to maintain or improve physical health [Has vocational interests or hobbies] : has vocational interests or hobbies [Part of a supportive family] : part of a supportive family [Steady employment] : steady employment [Housing stability] : housing stability [High level of education] : high level of education [English fluency] : English fluency [Connected to healthcare] : connected to healthcare [Access to safe outdoor spaces] : access to safe outdoor spaces [Social supports] : social supports [Mental Health] : Mental Health [Continued - Progress made] : Continued - Progress made: [Initial] : Initial [___ times a week] : [unfilled] times a week [Improvement in symptoms as evidenced by:] : Improvement in symptoms as evidenced by: [Yes] : Yes [Supervisor (if needed)] : Supervisor [FreeTextEntry2] : 8/1/24 [FreeTextEntry3] : 7/21/21 [FreeTextEntry1] : Managing symptoms of anxiety [FreeTextEntry4] : "Be more productive in daily life and work towards stated life goals." [de-identified] : Pt will continue to utilize coping skills to effectively plan and organize himself.  [de-identified] : Pt will continue building coping skills [de-identified] : 8/1/24 [de-identified] : Pt will identify problem solving skills and increase decision making.  [de-identified] : Work on improving financial wellbeing [de-identified] : 8/1/24 [de-identified] : Pt will create routine and work on habit building around his spending  [FreeTextEntry5] : Pt continues to identify ineffective behaviors to support improved organization and planning and to reduce feelings of overwhelm and anxiety. [de-identified] : Psychodynamic therapy [de-identified] : effective coping skills and observed independence

## 2024-05-15 NOTE — DISCUSSION/SUMMARY
[Plan Review] : Plan Review [Able to manage surrounding demands and opportunities] : able to manage surrounding demands and opportunities [Adherent to treatment recommendations] : adherent to treatment recommendations [Articulate] : articulate [Attempting to realize their potential] : Attempting to realize their potential [Cognitively intact] : cognitively intact [Insightful] : insightful [Creative] : creative [Intelligent] : intelligent [Motivated to participate in treatment] : motivated to participate in treatment [Motivated and ready for change] : motivated and ready for change [Health literacy] : health literacy [Motivated to maintain or improve physical health] : motivated to maintain or improve physical health [Has vocational interests or hobbies] : has vocational interests or hobbies [Part of a supportive family] : part of a supportive family [Steady employment] : steady employment [Housing stability] : housing stability [High level of education] : high level of education [English fluency] : English fluency [Connected to healthcare] : connected to healthcare [Access to safe outdoor spaces] : access to safe outdoor spaces [Social supports] : social supports [FreeTextEntry2] : 8/1/24 [FreeTextEntry3] : 7/21/21 [Mental Health] : Mental Health [Continued - Progress made] : Continued - Progress made: [Initial] : Initial [___ times a week] : [unfilled] times a week [Improvement in symptoms as evidenced by:] : Improvement in symptoms as evidenced by: [Yes] : Yes [Supervisor (if needed)] : Supervisor [FreeTextEntry1] : Managing symptoms of anxiety [de-identified] : Pt will continue to utilize coping skills to effectively plan and organize himself.  [FreeTextEntry4] : "Be more productive in daily life and work towards stated life goals." [de-identified] : Pt will continue building coping skills [de-identified] : 8/1/24 [de-identified] : Pt will identify problem solving skills and increase decision making.  [de-identified] : Work on improving financial wellbeing [de-identified] : 8/1/24 [de-identified] : Pt will create routine and work on habit building around his spending  [FreeTextEntry5] : Pt continues to identify ineffective behaviors to support improved organization and planning and to reduce feelings of overwhelm and anxiety. [de-identified] : Psychodynamic therapy [de-identified] : effective coping skills and observed independence

## 2024-05-15 NOTE — PLAN
[FreeTextEntry2] : Pt will continue with weekly individual psychotherapy.  [Psychodynamic Therapy] : Psychodynamic Therapy  [de-identified] : Pt arrived on time to individual psychotherapy session. Pt finished his schoolwork over the past week and returned home after the end of the semester. Pt shared updates on conversations with his father about his career and longterm goals. Therapist inquired on pt's underlying affective experience, as pt struggled to identify his emotions. Pt left session in good emotional and behavioral control.  [Recommended Frequency of Visits: ____] : Recommended frequency of visits: [unfilled] [FreeTextEntry1] : Pt will continue twice weekly individual psychotherapy with writer, and attend next session in two weeks.

## 2024-05-17 ENCOUNTER — APPOINTMENT (OUTPATIENT)
Dept: PSYCHIATRY | Facility: CLINIC | Age: 24
End: 2024-05-17

## 2024-05-22 ENCOUNTER — APPOINTMENT (OUTPATIENT)
Dept: PSYCHIATRY | Facility: CLINIC | Age: 24
End: 2024-05-22

## 2024-05-22 NOTE — PLAN
[FreeTextEntry2] : Pt will continue with weekly individual psychotherapy.  [Psychodynamic Therapy] : Psychodynamic Therapy  [de-identified] : Pt arrived on time to individual psychotherapy session. Pt described recent conversations with his family and resulting decisions for him to move back home to [place], find a new job, and pursue other activities as advised by his father. Pt and therapist explored pt's avoidance and its resulting impact as it relates to relationships and life goals. Pt left session in good emotional and behavioral control.  [Recommended Frequency of Visits: ____] : Recommended frequency of visits: [unfilled] [FreeTextEntry1] : Pt will continue twice weekly individual psychotherapy with writer, and attend next session in two weeks.

## 2024-05-22 NOTE — DISCUSSION/SUMMARY
[Plan Review] : Plan Review [Able to manage surrounding demands and opportunities] : able to manage surrounding demands and opportunities [Adherent to treatment recommendations] : adherent to treatment recommendations [Articulate] : articulate [Attempting to realize their potential] : Attempting to realize their potential [Cognitively intact] : cognitively intact [Insightful] : insightful [Creative] : creative [Intelligent] : intelligent [Motivated to participate in treatment] : motivated to participate in treatment [Motivated and ready for change] : motivated and ready for change [Health literacy] : health literacy [Motivated to maintain or improve physical health] : motivated to maintain or improve physical health [Has vocational interests or hobbies] : has vocational interests or hobbies [Part of a supportive family] : part of a supportive family [Steady employment] : steady employment [Housing stability] : housing stability [High level of education] : high level of education [English fluency] : English fluency [Connected to healthcare] : connected to healthcare [Access to safe outdoor spaces] : access to safe outdoor spaces [Social supports] : social supports [FreeTextEntry2] : 8/1/24 [FreeTextEntry3] : 7/21/21 [Mental Health] : Mental Health [Continued - Progress made] : Continued - Progress made: [Initial] : Initial [___ times a week] : [unfilled] times a week [Improvement in symptoms as evidenced by:] : Improvement in symptoms as evidenced by: [Yes] : Yes [Supervisor (if needed)] : Supervisor [FreeTextEntry1] : Managing symptoms of anxiety [FreeTextEntry4] : "Be more productive in daily life and work towards stated life goals." [de-identified] : Pt will continue to utilize coping skills to effectively plan and organize himself.  [de-identified] : Pt will continue building coping skills [de-identified] : 8/1/24 [de-identified] : Work on improving financial wellbeing [de-identified] : Pt will identify problem solving skills and increase decision making.  [de-identified] : 8/1/24 [de-identified] : Pt will create routine and work on habit building around his spending  [FreeTextEntry5] : Pt continues to identify ineffective behaviors to support improved organization and planning and to reduce feelings of overwhelm and anxiety. [de-identified] : Psychodynamic therapy [de-identified] : effective coping skills and observed independence

## 2024-05-23 ENCOUNTER — APPOINTMENT (OUTPATIENT)
Dept: PSYCHIATRY | Facility: CLINIC | Age: 24
End: 2024-05-23

## 2024-05-23 NOTE — REASON FOR VISIT
[Access issues (e.g., transportation, impaired mobility, etc.)] : due to patient's access issues [Continuity of care] : to ensure continuity of care [Continuing, patient not seen in-person within last 12 months (provide details below)] : Telehealth services are continuing, patient not seen in-person within last 12 months.  [Telehealth (audio & video) - Individual/Group] : This visit was provided via telehealth using real-time 2-way audio visual technology. [Other Location: e.g. Home (Enter Location, City,State)___] : The provider was located at [unfilled]. [Home] : The patient, [unfilled], was located at home, [unfilled], at the time of the visit. [Verbal consent obtained from patient/other participant(s)] : Verbal consent for telehealth/telephonic services obtained from patient/other participant(s) [FreeTextEntry4] : 2:00 PM [FreeTextEntry5] : 2:45 PM [FreeTextEntry3] : In-person session not clinically indicated. Pt prefers telehealth due to access issues.  [Patient] : Patient

## 2024-05-23 NOTE — DISCUSSION/SUMMARY
[Plan Review] : Plan Review [Able to manage surrounding demands and opportunities] : able to manage surrounding demands and opportunities [Adherent to treatment recommendations] : adherent to treatment recommendations [Articulate] : articulate [Attempting to realize their potential] : Attempting to realize their potential [Cognitively intact] : cognitively intact [Insightful] : insightful [Creative] : creative [Intelligent] : intelligent [Motivated to participate in treatment] : motivated to participate in treatment [Motivated and ready for change] : motivated and ready for change [Health literacy] : health literacy [Motivated to maintain or improve physical health] : motivated to maintain or improve physical health [Has vocational interests or hobbies] : has vocational interests or hobbies [Part of a supportive family] : part of a supportive family [Steady employment] : steady employment [Housing stability] : housing stability [High level of education] : high level of education [English fluency] : English fluency [Connected to healthcare] : connected to healthcare [Access to safe outdoor spaces] : access to safe outdoor spaces [Social supports] : social supports [FreeTextEntry2] : 8/1/24 [FreeTextEntry3] : 7/21/21 [Mental Health] : Mental Health [Continued - Progress made] : Continued - Progress made: [Initial] : Initial [___ times a week] : [unfilled] times a week [Improvement in symptoms as evidenced by:] : Improvement in symptoms as evidenced by: [Yes] : Yes [Supervisor (if needed)] : Supervisor [FreeTextEntry1] : Managing symptoms of anxiety [FreeTextEntry4] : "Be more productive in daily life and work towards stated life goals." [de-identified] : Pt will continue to utilize coping skills to effectively plan and organize himself.  [de-identified] : Pt will continue building coping skills [de-identified] : 8/1/24 [de-identified] : Pt will identify problem solving skills and increase decision making.  [de-identified] : Work on improving financial wellbeing [de-identified] : 8/1/24 [de-identified] : Pt will create routine and work on habit building around his spending  [FreeTextEntry5] : Pt continues to identify ineffective behaviors to support improved organization and planning and to reduce feelings of overwhelm and anxiety. [de-identified] : Psychodynamic therapy [de-identified] : effective coping skills and observed independence

## 2024-05-23 NOTE — PLAN
[FreeTextEntry2] : Pt will continue with weekly individual psychotherapy.  [Psychodynamic Therapy] : Psychodynamic Therapy  [de-identified] : Pt arrived on time to individual psychotherapy session. Pt reported on intense feelings of anxiety, agitation, and frustration over the week. Pt also shared that he began attending [facility] and described his experiences with that. Therapist created space for validation and emotional processing. Pt left session in good emotional and behavioral control.  [Recommended Frequency of Visits: ____] : Recommended frequency of visits: [unfilled] [FreeTextEntry1] : Pt will continue twice weekly individual psychotherapy with writer, and attend next session in two weeks.

## 2024-05-29 ENCOUNTER — APPOINTMENT (OUTPATIENT)
Dept: PSYCHIATRY | Facility: CLINIC | Age: 24
End: 2024-05-29
Payer: COMMERCIAL

## 2024-05-29 PROCEDURE — G2211 COMPLEX E/M VISIT ADD ON: CPT | Mod: NC,95

## 2024-05-29 PROCEDURE — 99214 OFFICE O/P EST MOD 30 MIN: CPT | Mod: 95

## 2024-05-29 PROCEDURE — 90833 PSYTX W PT W E/M 30 MIN: CPT | Mod: 95

## 2024-05-29 NOTE — DISCUSSION/SUMMARY
[FreeTextEntry1] : Patient is a 22yo  (black/), cis gender, heterosexual male domiciled with his father, rising sophomore at [employer], with no significant PMHx, and PPHx significant for ADHD, IZZY, MDD recurrent, currently prescribed Adderall 15mg PO TID by pediatrician, in psychotherapy with Linda Connell for many years. Patient does not have a hx of psych hospitalization, suicide attempts, or psych ER visits. Pt. is the only child of  parents. Stressors include interpersonal distress related to relationship with parents and GF. Patient endorsed inattentive sxs of ADHD, IZZY, and depression and presented for medication consultation in the context of worsening mood and increased anxiety in Jan 2023 - started on Fluoxetine with good tolerance and response. Dose decreased from 40mg to 20mg in October 2023 per pt request. Fluoxetine dose increased again from 20mg to 40mg in April 2024. Adderall XR increased from 10mg to 20mg on 5/29/24.  Time Spent: Reviewing chart, developing rapport, psychoeducation, diagnosis, medication mgmt, reviewed risks vs benefits of meds, indication, and potential adverse effects. Shared decision to continue adderall ER 10mg PO Qam (3/20/24) and monitor for tolerance and response. Will add a 5mg IR tablet (can take full tablet or half tablet PRN). Decision based on balancing efficacy and s/e.. NOTE:  Increased adderall ER 10mg to 15mg PO QDaily on 2/21/24 but pt experienced more s/e. Increased from 10mg to 20mg today (5/29/24) after pt shared doubling dose was more helpful than taking the 10mg ER with 5mg IR. Notes he has many responsibilities this summer and needs ADHD sxs to be under better control. Shared decision to continue dose of fluoxetine given good tolerance and response.  Psychotherapy: 16 minutes spent - psychoeducation, supportive counseling, discussed conflicted feeling re: parents enrolling him in a gina addicition program.   Name of PCP: Dr. Kimberley Gates Date of Last Physical Exam: 2019 (reminded to scheduled annual exam.) Date of Last Annual Labs: Cannot recall Annual Review of Systems Completed (Y/N): Y Tobacco Screening Completed (Y/N): Y (non-smoker)

## 2024-05-29 NOTE — REASON FOR VISIT
[Telehealth (audio & video) - Individual/Group] : This visit was provided via telehealth using real-time 2-way audio visual technology. [Patient preference] : Patient preference. [Other Location: e.g. Home (Enter Location, City,State)___] : The provider was located at [unfilled]. [Home] : The patient, [unfilled], was located at home, [unfilled], at the time of the visit. [Verbal consent obtained from patient/other participant(s)] : Verbal consent for telehealth/telephonic services obtained from patient/other participant(s) [Patient] : Patient [FreeTextEntry4] : 1:30p [FreeTextEntry5] : 2:01p [FreeTextEntry2] : 1/9/24 [FreeTextEntry1] : Psychiatric follow up

## 2024-05-29 NOTE — PLAN
[Medication education provided] : Medication education provided. [Rationale for medication choices, possible risks/precautions, benefits, alternative treatment choices, and consequences of non-treatment discussed] : Rationale for medication choices, possible risks/precautions, benefits, alternative treatment choices, and consequences of non-treatment discussed with patient/family/caregiver  [Order(s) for medication placed in [facility] EHR] : Medication [FreeTextEntry4] : Recently started meds to address sxs of anxiety, depression, binge eating. [FreeTextEntry5] : PLAN: #) Continue fluoxetine 40mg PO QDaily  #) Increase adderall XR 10mg to 20mg PO Qam on 5/29/24 (dose decreased from 15mg to 10mg on 3/20/24) and monitor for tolerance and response. d/c 5mg IR tablet (can take full tablet or half tablet PRN) on 5/29/24 #) Continue I therapy #) F/U in one month

## 2024-05-29 NOTE — PAST MEDICAL HISTORY
[FreeTextEntry1] : Patient has been in psychotherapy treatment since July 2018 with Linda Connell at the psychological center at [facility].  Patient terminated treatment there in May 2021.  Patient has also completed a neuropsychological testing in March 2018.  Patient was diagnosed with IZZY, ADHD, inattentive type, moderate, and MDD, recurring, moderate. Patient denies past psychiatric hospitalization.  Patient was prescribed Adderall 15 mg from pediatrician in August 2020. \par

## 2024-05-29 NOTE — HISTORY OF PRESENT ILLNESS
[FreeTextEntry1] : Pt is seen and evaluated. Interval hx reviewed. 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 Pt is in a [facility] program. He struggled with finals and started doubling dose of adderall XR for a total of 20mg and notes the benefits outweigh side effects (s/e= crashing effect). Prefer this to taking additional IR dose. Sad to end his time with therapist and notes parents wish for him to have a more consistent therapist. Reports less binge eating. Notes he is tolerating higher dose of fluoxetine (40mg PO QD). He denies any other change from baseline.  Note: has been dx with adhd inattentive type, IZZY, and MDD with neuropsych testing in 2018. Prescribed Adderall 15mg by pediatrician, Dr. Gloria [phone]. Decrease fluoxetine from 40mg to 20mg PO Qdaily on 10/6/23.

## 2024-05-29 NOTE — SOCIAL HISTORY
[With Family] : lives with family [Unemployed] : unemployed [Never ] : never  [High School] : high school [None] : none [Yes] : yes [FreeTextEntry1] : Living with Mother and Father in separate homes.  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

## 2024-05-30 ENCOUNTER — APPOINTMENT (OUTPATIENT)
Dept: PSYCHIATRY | Facility: CLINIC | Age: 24
End: 2024-05-30

## 2024-06-07 ENCOUNTER — APPOINTMENT (OUTPATIENT)
Dept: PSYCHIATRY | Facility: CLINIC | Age: 24
End: 2024-06-07

## 2024-06-07 NOTE — PLAN
[FreeTextEntry2] : Pt will continue with weekly individual psychotherapy.  [Psychodynamic Therapy] : Psychodynamic Therapy  [de-identified] : Pt arrived on time to final individual psychotherapy session in person. Pt and therapist continued processing termination and pt's feelings about ending the current therapy. Pt shared his reflections of his own journey so far and explored what goals he wants to work on in future therapy. Pt left final session in good emotional and behavioral control.  [Recommended Frequency of Visits: ____] : Recommended frequency of visits: [unfilled] [FreeTextEntry1] : Pt referred to waitlist for reassignment to new therapist for weekly or biweekly individual psychotherapy.  Pt will continue medication management with Dr. Aysha Houston.

## 2024-06-07 NOTE — DISCUSSION/SUMMARY
[Plan Review] : Plan Review [Able to manage surrounding demands and opportunities] : able to manage surrounding demands and opportunities [Adherent to treatment recommendations] : adherent to treatment recommendations [Articulate] : articulate [Attempting to realize their potential] : Attempting to realize their potential [Cognitively intact] : cognitively intact [Insightful] : insightful [Creative] : creative [Intelligent] : intelligent [Motivated to participate in treatment] : motivated to participate in treatment [Motivated and ready for change] : motivated and ready for change [Health literacy] : health literacy [Motivated to maintain or improve physical health] : motivated to maintain or improve physical health [Has vocational interests or hobbies] : has vocational interests or hobbies [Part of a supportive family] : part of a supportive family [Steady employment] : steady employment [Housing stability] : housing stability [High level of education] : high level of education [English fluency] : English fluency [Connected to healthcare] : connected to healthcare [Access to safe outdoor spaces] : access to safe outdoor spaces [Social supports] : social supports [FreeTextEntry2] : 8/1/24 [FreeTextEntry3] : 7/21/21 [Mental Health] : Mental Health [Continued - Progress made] : Continued - Progress made: [Initial] : Initial [___ times a week] : [unfilled] times a week [Improvement in symptoms as evidenced by:] : Improvement in symptoms as evidenced by: [Yes] : Yes [Supervisor (if needed)] : Supervisor [FreeTextEntry1] : Managing symptoms of anxiety [FreeTextEntry4] : "Be more productive in daily life and work towards stated life goals." [de-identified] : Pt will continue to utilize coping skills to effectively plan and organize himself.  [de-identified] : Pt will continue building coping skills [de-identified] : 8/1/24 [de-identified] : Pt will identify problem solving skills and increase decision making.  [de-identified] : Work on improving financial wellbeing [de-identified] : 8/1/24 [de-identified] : Pt will create routine and work on habit building around his spending  [FreeTextEntry5] : Pt continues to identify ineffective behaviors to support improved organization and planning and to reduce feelings of overwhelm and anxiety. [de-identified] : Psychodynamic therapy [de-identified] : effective coping skills and observed independence

## 2024-06-07 NOTE — PHYSICAL EXAM
[Cooperative] : cooperative [Euthymic] : euthymic [Full] : full [Clear] : clear [Linear/Goal Directed] : linear/goal directed [Average] : average [WNL] : within normal limits [] : No [2 - 2  to 4 times a month] : 2 - 2  to 4 times a month [3 - 7 to 9] : 3 - 7 to 9 [3 - Weekly] : 3 - Weekly [0 - Never] : 0 - Never [0 - No] : 0 - No [2 - Yes, but not in the last year] : 2 - Yes, but not in the last year [3] : 3 [4] : 4 [2] : 2 [FreeTextEntry1] : 1 [SchwartzScore] : 28

## 2024-06-07 NOTE — PLAN
[FreeTextEntry2] : Pt will continue with weekly individual psychotherapy.  [Psychodynamic Therapy] : Psychodynamic Therapy  [de-identified] : Pt arrived on time to individual psychotherapy session. Pt and therapist switched gears to discussing final sessions this week, and pt's experience of treatment thusfar. Therapist provided feedback to pt on his progress and gains, as well as psychoeducation on family therapy and why that may be useful for him. Pt left session in good emotional and behavioral control.  [Recommended Frequency of Visits: ____] : Recommended frequency of visits: [unfilled] [FreeTextEntry1] : Pt will continue twice weekly individual psychotherapy with writer, and attend next session in two weeks.

## 2024-06-07 NOTE — DISCUSSION/SUMMARY
[Plan Review] : Plan Review [Able to manage surrounding demands and opportunities] : able to manage surrounding demands and opportunities [Adherent to treatment recommendations] : adherent to treatment recommendations [Articulate] : articulate [Attempting to realize their potential] : Attempting to realize their potential [Cognitively intact] : cognitively intact [Insightful] : insightful [Creative] : creative [Intelligent] : intelligent [Motivated to participate in treatment] : motivated to participate in treatment [Motivated and ready for change] : motivated and ready for change [Health literacy] : health literacy [Motivated to maintain or improve physical health] : motivated to maintain or improve physical health [Has vocational interests or hobbies] : has vocational interests or hobbies [Part of a supportive family] : part of a supportive family [Steady employment] : steady employment [Housing stability] : housing stability [High level of education] : high level of education [English fluency] : English fluency [Connected to healthcare] : connected to healthcare [Access to safe outdoor spaces] : access to safe outdoor spaces [Social supports] : social supports [FreeTextEntry3] : 7/21/21 [FreeTextEntry2] : 8/1/24 [Mental Health] : Mental Health [Continued - Progress made] : Continued - Progress made: [Initial] : Initial [___ times a week] : [unfilled] times a week [Improvement in symptoms as evidenced by:] : Improvement in symptoms as evidenced by: [Yes] : Yes [Supervisor (if needed)] : Supervisor [FreeTextEntry1] : Managing symptoms of anxiety [FreeTextEntry4] : "Be more productive in daily life and work towards stated life goals." [de-identified] : Pt will continue to utilize coping skills to effectively plan and organize himself.  [de-identified] : Pt will continue building coping skills [de-identified] : 8/1/24 [de-identified] : Pt will identify problem solving skills and increase decision making.  [de-identified] : Work on improving financial wellbeing [de-identified] : 8/1/24 [de-identified] : Pt will create routine and work on habit building around his spending  [FreeTextEntry5] : Pt continues to identify ineffective behaviors to support improved organization and planning and to reduce feelings of overwhelm and anxiety. [de-identified] : Psychodynamic therapy [de-identified] : effective coping skills and observed independence

## 2024-06-13 ENCOUNTER — APPOINTMENT (OUTPATIENT)
Dept: PSYCHIATRY | Facility: CLINIC | Age: 24
End: 2024-06-13
Payer: COMMERCIAL

## 2024-06-13 PROCEDURE — 90833 PSYTX W PT W E/M 30 MIN: CPT | Mod: 95

## 2024-06-13 PROCEDURE — 99214 OFFICE O/P EST MOD 30 MIN: CPT | Mod: 95

## 2024-06-13 PROCEDURE — G2211 COMPLEX E/M VISIT ADD ON: CPT | Mod: NC,95

## 2024-06-13 RX ORDER — DEXTROAMPHETAMINE SACCHARATE, AMPHETAMINE ASPARTATE MONOHYDRATE, DEXTROAMPHETAMINE SULFATE AND AMPHETAMINE SULFATE 5; 5; 5; 5 MG/1; MG/1; MG/1; MG/1
20 CAPSULE, EXTENDED RELEASE ORAL
Qty: 30 | Refills: 0 | Status: ACTIVE | COMMUNITY
Start: 2024-05-29 | End: 1900-01-01

## 2024-06-13 NOTE — RISK ASSESSMENT
[No, patient denies ideation or behavior] : No, patient denies ideation or behavior [Yes] : Yes [Low acute suicide risk] : Low acute suicide risk [FreeTextEntry9] : Low acute risk. Passive SI with no plan or intent. No hx of attempts. Protective factors include family support, future orientation and connection to care.  [No] : No [Not clinically indicated] : Safety Plan completed/updated (for individuals at risk): Not clinically indicated

## 2024-06-13 NOTE — PLAN
[Medication education provided] : Medication education provided. [Rationale for medication choices, possible risks/precautions, benefits, alternative treatment choices, and consequences of non-treatment discussed] : Rationale for medication choices, possible risks/precautions, benefits, alternative treatment choices, and consequences of non-treatment discussed with patient/family/caregiver  [Order(s) for medication placed in [facility] EHR] : Medication [FreeTextEntry5] : PLAN: #) Continue fluoxetine 40mg PO QDaily  #) Increase adderall XR 10mg to 20mg PO Qam on 5/29/24 (dose decreased from 15mg to 10mg on 3/20/24) and monitor for tolerance and response. d/c 5mg IR tablet (can take full tablet or half tablet PRN) on 5/29/24 #) Continue I therapy #) F/U in one month [FreeTextEntry4] : Recently started meds to address sxs of anxiety, depression, binge eating.

## 2024-06-13 NOTE — SOCIAL HISTORY
[With Family] : lives with family [Unemployed] : unemployed [Never ] : never  [High School] : high school [None] : none [FreeTextEntry1] : Living with Mother and Father in separate homes.  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

## 2024-06-13 NOTE — REASON FOR VISIT
[Telehealth (audio & video) - Individual/Group] : This visit was provided via telehealth using real-time 2-way audio visual technology. [Patient preference] : Patient preference. [Other Location: e.g. Home (Enter Location, City,State)___] : The provider was located at [unfilled]. [Home] : The patient, [unfilled], was located at home, [unfilled], at the time of the visit. [Verbal consent obtained from patient/other participant(s)] : Verbal consent for telehealth/telephonic services obtained from patient/other participant(s) [FreeTextEntry5] : 3:31p [FreeTextEntry4] : 3:00p [Patient] : Patient [FreeTextEntry2] : 1/9/24 [FreeTextEntry1] : Psychiatric follow up

## 2024-06-13 NOTE — DISCUSSION/SUMMARY
[FreeTextEntry1] : Patient is a 22yo  (black/), cis gender, heterosexual male domiciled with his father, rising sophomore at [employer], with no significant PMHx, and PPHx significant for ADHD, IZZY, MDD recurrent, currently prescribed Adderall 15mg PO TID by pediatrician, in psychotherapy with Linda Connell for many years. Patient does not have a hx of psych hospitalization, suicide attempts, or psych ER visits. Pt. is the only child of  parents. Stressors include interpersonal distress related to relationship with parents and GF. Patient endorsed inattentive sxs of ADHD, IZZY, and depression and presented for medication consultation in the context of worsening mood and increased anxiety in Jan 2023 - started on Fluoxetine with good tolerance and response. Dose decreased from 40mg to 20mg in October 2023 per pt request. Fluoxetine dose increased again from 20mg to 40mg in April 2024. Adderall XR increased from 10mg to 20mg on 5/29/24.  Time Spent: Reviewing chart, developing rapport, psychoeducation, diagnosis, medication mgmt, reviewed risks vs benefits of meds, indication, and potential adverse effects. Shared decision to continue adderall ER 10mg PO Qam (3/20/24) and monitor for tolerance and response. Will add a 5mg IR tablet (can take full tablet or half tablet PRN). Decision based on balancing efficacy and s/e.. NOTE:  Increased adderall ER 10mg to 15mg PO QDaily on 2/21/24 but pt experienced more s/e. Increased from 10mg to 20mg today (5/29/24) after pt shared doubling dose was more helpful than taking the 10mg ER with 5mg IR. Notes he has many responsibilities this summer and needs ADHD sxs to be under better control. Shared decision to continue dose of fluoxetine given good tolerance and response.  Psychotherapy: 16 minutes spent - psychoeducation, supportive counseling, discussed conflicted feeling re: parents enrolling him in a gina addicition program. Discussed father's concerns about medications and planned to have family meeting. Explored pts wishes re: meds.  Name of PCP: Dr. Kimberley Gates Date of Last Physical Exam: 2019 (reminded to scheduled annual exam.) Date of Last Annual Labs: Cannot recall Annual Review of Systems Completed (Y/N): Y Tobacco Screening Completed (Y/N): Y (non-smoker)

## 2024-06-13 NOTE — HISTORY OF PRESENT ILLNESS
[FreeTextEntry1] : Pt is seen and evaluated. Interval hx reviewed. 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 Reports difficulty getting adderall from local pharmacy. Share fransico father has concerns about fluoxetine and wishes to meet to "discuss goals". Pt is open to the idea of a family meeting. He denies any other change from baseline.  Note: has been dx with adhd inattentive type, IZZY, and MDD with neuropsych testing in 2018. Prescribed Adderall 15mg by pediatrician, Dr. Gloria [phone]. Decrease fluoxetine from 40mg to 20mg PO Qdaily on 10/6/23.

## 2024-06-13 NOTE — PAST MEDICAL HISTORY
[FreeTextEntry1] : Patient has been in psychotherapy treatment since July 2018 with Linda Connell at the psychological center at [facility].  Patient terminated treatment there in May 2021.  Patient has also completed a neuropsychological testing in March 2018.  Patient was diagnosed with IZZY, ADHD, inattentive type, moderate, and MDD, recurring, moderate. Patient denies past psychiatric hospitalization.  Patient was prescribed Adderall 15 mg from pediatrician in August 2020. \par

## 2024-06-14 ENCOUNTER — APPOINTMENT (OUTPATIENT)
Dept: PSYCHIATRY | Facility: CLINIC | Age: 24
End: 2024-06-14

## 2024-06-14 ENCOUNTER — NON-APPOINTMENT (OUTPATIENT)
Age: 24
End: 2024-06-14

## 2024-06-21 ENCOUNTER — APPOINTMENT (OUTPATIENT)
Dept: PSYCHIATRY | Facility: CLINIC | Age: 24
End: 2024-06-21

## 2024-06-25 ENCOUNTER — APPOINTMENT (OUTPATIENT)
Dept: PSYCHIATRY | Facility: CLINIC | Age: 24
End: 2024-06-25
Payer: COMMERCIAL

## 2024-06-25 DIAGNOSIS — F41.1 GENERALIZED ANXIETY DISORDER: ICD-10-CM

## 2024-06-25 DIAGNOSIS — F50.9 EATING DISORDER, UNSPECIFIED: ICD-10-CM

## 2024-06-25 DIAGNOSIS — F33.1 MAJOR DEPRESSIVE DISORDER, RECURRENT, MODERATE: ICD-10-CM

## 2024-06-25 DIAGNOSIS — F90.0 ATTENTION-DEFICIT HYPERACTIVITY DISORDER, PREDOMINANTLY INATTENTIVE TYPE: ICD-10-CM

## 2024-06-25 PROCEDURE — G2211 COMPLEX E/M VISIT ADD ON: CPT | Mod: NC,95

## 2024-06-25 PROCEDURE — 99215 OFFICE O/P EST HI 40 MIN: CPT | Mod: 95

## 2024-06-25 RX ORDER — FLUOXETINE HYDROCHLORIDE 40 MG/1
40 CAPSULE ORAL DAILY
Qty: 30 | Refills: 1 | Status: ACTIVE | COMMUNITY
Start: 2024-04-17 | End: 1900-01-01

## 2024-07-12 ENCOUNTER — APPOINTMENT (OUTPATIENT)
Dept: PSYCHIATRY | Facility: CLINIC | Age: 24
End: 2024-07-12
Payer: COMMERCIAL

## 2024-07-12 DIAGNOSIS — F50.9 EATING DISORDER, UNSPECIFIED: ICD-10-CM

## 2024-07-12 DIAGNOSIS — F41.1 GENERALIZED ANXIETY DISORDER: ICD-10-CM

## 2024-07-12 DIAGNOSIS — F90.0 ATTENTION-DEFICIT HYPERACTIVITY DISORDER, PREDOMINANTLY INATTENTIVE TYPE: ICD-10-CM

## 2024-07-12 DIAGNOSIS — F33.1 MAJOR DEPRESSIVE DISORDER, RECURRENT, MODERATE: ICD-10-CM

## 2024-07-12 PROCEDURE — G2211 COMPLEX E/M VISIT ADD ON: CPT | Mod: NC,95

## 2024-07-12 PROCEDURE — 90833 PSYTX W PT W E/M 30 MIN: CPT | Mod: 95

## 2024-07-12 PROCEDURE — 99214 OFFICE O/P EST MOD 30 MIN: CPT | Mod: 95

## 2024-07-29 ENCOUNTER — APPOINTMENT (OUTPATIENT)
Dept: PSYCHIATRY | Facility: CLINIC | Age: 24
End: 2024-07-29
Payer: COMMERCIAL

## 2024-07-29 DIAGNOSIS — F41.1 GENERALIZED ANXIETY DISORDER: ICD-10-CM

## 2024-07-29 DIAGNOSIS — F33.1 MAJOR DEPRESSIVE DISORDER, RECURRENT, MODERATE: ICD-10-CM

## 2024-07-29 DIAGNOSIS — F90.0 ATTENTION-DEFICIT HYPERACTIVITY DISORDER, PREDOMINANTLY INATTENTIVE TYPE: ICD-10-CM

## 2024-07-29 DIAGNOSIS — F50.9 EATING DISORDER, UNSPECIFIED: ICD-10-CM

## 2024-07-29 PROCEDURE — 90833 PSYTX W PT W E/M 30 MIN: CPT | Mod: 95

## 2024-07-29 PROCEDURE — 99214 OFFICE O/P EST MOD 30 MIN: CPT | Mod: 95

## 2024-07-29 PROCEDURE — G2211 COMPLEX E/M VISIT ADD ON: CPT | Mod: NC,95

## 2024-07-29 NOTE — PLAN
[Psychodynamic Therapy] : Psychodynamic Therapy  [Return in ____ week(s)] : Return in [unfilled] week(s) [Recommended Frequency of Visits: ____] : Recommended frequency of visits: [unfilled] [FreeTextEntry2] : Patient and clinician will continue once weekly individual psychodynamic psychotherapy. [de-identified] : Patient arrived on time to his virtual individual psychotherapy session. Patient and clinician discussed his mental health history, past experiences with psychotherapy, and his goals for future treatment. Clinician engaged in psychoeducation, active listening, and validation to build trust and facilitate the development of a strong therapeutic alliance. Patient left the session in good emotional and behavioral control.  [FreeTextEntry1] : Patient and clinician will meet in one week to continue once weekly individual psychodynamic psychotherapy.

## 2024-07-29 NOTE — REASON FOR VISIT
[Telehealth (audio & video) - Individual/Group] : This visit was provided via telehealth using real-time 2-way audio visual technology. [Patient preference] : Patient preference. [Other Location: e.g. Home (Enter Location, City,State)___] : The provider was located at [unfilled]. [Home] : The patient, [unfilled], was located at home, [unfilled], at the time of the visit. [Verbal consent obtained from patient/other participant(s)] : Verbal consent for telehealth/telephonic services obtained from patient/other participant(s) [Patient] : Patient [FreeTextEntry4] : 3:00p [FreeTextEntry5] : 3:31p [FreeTextEntry2] : 1/9/24 [FreeTextEntry1] : Psychiatric follow up

## 2024-07-29 NOTE — ADDENDUM
[FreeTextEntry1] : Clinician will review this psychotherapy session with community supervisor, Mychal Jimenez, PhD, in individual supervision.

## 2024-07-29 NOTE — PLAN
[Medication education provided] : Medication education provided. [Rationale for medication choices, possible risks/precautions, benefits, alternative treatment choices, and consequences of non-treatment discussed] : Rationale for medication choices, possible risks/precautions, benefits, alternative treatment choices, and consequences of non-treatment discussed with patient/family/caregiver  [Order(s) for medication placed in [facility] EHR] : Medication [FreeTextEntry4] : Recently started meds to address sxs of anxiety, depression, binge eating. [FreeTextEntry5] : PLAN: #) Continue fluoxetine 40mg PO QDaily  #) NOT: pt has been unable to get stimulant medication - he will reach out to local pharmacies to see if they carry brand addderal, vyvanse, concerta, metadate.  Increase adderall XR 10mg to 20mg PO Qam on 5/29/24 (dose decreased from 15mg to 10mg on 3/20/24) and monitor for tolerance and response. d/c 5mg IR tablet (can take full tablet or half tablet PRN) on 5/29/24 #) Continue I therapy #) F/U in 2 weeks #) schedule f/u fmaily meeting

## 2024-07-29 NOTE — SOCIAL HISTORY
[With Family] : lives with family [Unemployed] : unemployed [Never ] : never  [High School] : high school [None] : none [Yes] : yes [FreeTextEntry1] : Living with Mother and Father in separate homes.  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

## 2024-07-29 NOTE — HISTORY OF PRESENT ILLNESS
[FreeTextEntry1] : Pt is seen and evaluated. Interval hx reviewed. Pt notes he plans to take a leave of absence for the Fall semester. He plans to take a break from adderall. Notes he forgot to take fluoxetine for several days and experienced negative thoughts "not quite suicidal thoughts" but close. Scheduled to meet with his new therapist this afternoon. Inquires about the possibility of future dose titration of fluoxetine.  Note: has been dx with adhd inattentive type, IZZY, and MDD with neuropsych testing in 2018. Prescribed Adderall 15mg by pediatrician, Dr. Gloria [phone]. Decrease fluoxetine from 40mg to 20mg PO Qdaily on 10/6/23.

## 2024-07-29 NOTE — REASON FOR VISIT
[Patient preference] : as per patient preference [Continuing, patient not seen in-person within last 12 months (provide details below)] : Telehealth services are continuing, patient not seen in-person within last 12 months.  [Telehealth (audio & video) - Individual/Group] : This visit was provided via telehealth using real-time 2-way audio visual technology. [Home] : The patient, [unfilled], was located at home, [unfilled], at the time of the visit. [Verbal consent obtained from patient/other participant(s)] : Verbal consent for telehealth/telephonic services obtained from patient/other participant(s) [Patient] : Patient [Medical Office: ([facility])___] : The provider was located at the medical office in [unfilled]. [FreeTextEntry4] : 4:00 PM [FreeTextEntry5] : 4:45 PM [FreeTextEntry2] : N/A [FreeTextEntry3] : Clinician's first session with patient. In-person sessions are not clinically indicated at this time. [FreeTextEntry1] : Initiation of ongoing weekly individual psychotherapy.

## 2024-07-29 NOTE — DISCUSSION/SUMMARY
[FreeTextEntry1] : Patient is a 24yo  (black/), cis gender, heterosexual male domiciled with his father, rising sophomore at [employer], with no significant PMHx, and PPHx significant for ADHD, IZZY, MDD recurrent, currently prescribed Adderall 15mg PO TID by pediatrician, in psychotherapy with Linda Connell for many years. Patient does not have a hx of psych hospitalization, suicide attempts, or psych ER visits. Pt. is the only child of  parents. Stressors include interpersonal distress related to relationship with parents and GF. Patient endorsed inattentive sxs of ADHD, IZZY, and depression and presented for medication consultation in the context of worsening mood and increased anxiety in Jan 2023 - started on Fluoxetine with good tolerance and response. Dose decreased from 40mg to 20mg in October 2023 per pt request. Fluoxetine dose increased again from 20mg to 40mg in April 2024. Adderall XR increased from 10mg to 20mg on 5/29/24. Currently taking a break from Adderall as of summer 2024.  Time Spent: Reviewing chart, developing rapport, psychoeducation, diagnosis, medication mgmt, reviewed risks vs benefits of meds, indication, and potential adverse effects. Shared decision to hold adderall ER given lack of availability and leave of absence from school. Explored when/if SSRI should be down titrated.  NOTE:  Increased adderall ER 10mg to 15mg PO QDaily on 2/21/24 but pt experienced more s/e. Increased from 10mg to 20mg today (5/29/24) after pt shared doubling dose was more helpful than taking the 10mg ER with 5mg IR. Notes he has many responsibilities this summer and needs ADHD sxs to be under better control. Shared decision to continue dose of fluoxetine given good tolerance and response.  Psychotherapy: 16 minutes spent - psychoeducation, family work, supportive counseling.. Discussed father's concerns about medications, diagnosis and prognosis  Name of PCP: Dr. Kimberley Gates Date of Last Physical Exam: 2019 (reminded to scheduled annual exam.) Date of Last Annual Labs: Cannot recall Annual Review of Systems Completed (Y/N): Y Tobacco Screening Completed (Y/N): Y (non-smoker)

## 2024-07-29 NOTE — PAST MEDICAL HISTORY
[FreeTextEntry1] : Patient has been in psychotherapy treatment since July 2018 with Linda Connell at the psychological center at [facility].  Patient terminated treatment there in May 2021.  Patient has also completed a neuropsychological testing in March 2018.  Patient was diagnosed with IZZY, ADHD, inattentive type, moderate, and MDD, recurring, moderate. Patient denies past psychiatric hospitalization.  Patient was prescribed Adderall 15 mg from pediatrician in August 2020. \par

## 2024-07-29 NOTE — REASON FOR VISIT
[Patient preference] : as per patient preference [Continuing, patient not seen in-person within last 12 months (provide details below)] : Telehealth services are continuing, patient not seen in-person within last 12 months.  [Telehealth (audio & video) - Individual/Group] : This visit was provided via telehealth using real-time 2-way audio visual technology. [Home] : The patient, [unfilled], was located at home, [unfilled], at the time of the visit. [Verbal consent obtained from patient/other participant(s)] : Verbal consent for telehealth/telephonic services obtained from patient/other participant(s) [Patient] : Patient [Medical Office: ([facility])___] : The provider was located at the medical office in [unfilled]. [FreeTextEntry4] : 4:00 PM [FreeTextEntry5] : 4:45 PM [FreeTextEntry2] : N/A [FreeTextEntry3] : Clinician's first session with patient. In-person sessions are not clinically indicated at this time. [FreeTextEntry1] : Initiation of ongoing weekly individual psychotherapy.

## 2024-07-29 NOTE — PLAN
[Psychodynamic Therapy] : Psychodynamic Therapy  [Return in ____ week(s)] : Return in [unfilled] week(s) [Recommended Frequency of Visits: ____] : Recommended frequency of visits: [unfilled] [FreeTextEntry2] : Patient and clinician will continue once weekly individual psychodynamic psychotherapy. [de-identified] : Patient arrived on time to his virtual individual psychotherapy session. Patient and clinician discussed his mental health history, past experiences with psychotherapy, and his goals for future treatment. Clinician engaged in psychoeducation, active listening, and validation to build trust and facilitate the development of a strong therapeutic alliance. Patient left the session in good emotional and behavioral control.  [FreeTextEntry1] : Patient and clinician will meet in one week to continue once weekly individual psychodynamic psychotherapy.

## 2024-07-29 NOTE — RISK ASSESSMENT
[No, patient denies ideation or behavior] : No, patient denies ideation or behavior [Low acute suicide risk] : Low acute suicide risk [Not clinically indicated] : Safety Plan completed/updated (for individuals at risk): Not clinically indicated [Yes] : Yes [No] : No

## 2024-07-29 NOTE — PLAN
[Psychodynamic Therapy] : Psychodynamic Therapy  [Return in ____ week(s)] : Return in [unfilled] week(s) [Recommended Frequency of Visits: ____] : Recommended frequency of visits: [unfilled] [FreeTextEntry2] : Patient and clinician will continue once weekly individual psychodynamic psychotherapy. [de-identified] : Patient arrived on time to his virtual individual psychotherapy session. Patient and clinician discussed his mental health history, past experiences with psychotherapy, and his goals for future treatment. Clinician engaged in psychoeducation, active listening, and validation to build trust and facilitate the development of a strong therapeutic alliance. Patient left the session in good emotional and behavioral control.  [FreeTextEntry1] : Patient and clinician will meet in one week to continue once weekly individual psychodynamic psychotherapy.

## 2024-07-29 NOTE — REASON FOR VISIT
[Patient preference] : as per patient preference [Continuing, patient not seen in-person within last 12 months (provide details below)] : Telehealth services are continuing, patient not seen in-person within last 12 months.  [Telehealth (audio & video) - Individual/Group] : This visit was provided via telehealth using real-time 2-way audio visual technology. [Home] : The patient, [unfilled], was located at home, [unfilled], at the time of the visit. [Verbal consent obtained from patient/other participant(s)] : Verbal consent for telehealth/telephonic services obtained from patient/other participant(s) [Patient] : Patient [Medical Office: ([facility])___] : The provider was located at the medical office in [unfilled]. [FreeTextEntry4] : 4:00 PM [FreeTextEntry5] : 4:45 PM [FreeTextEntry2] : N/A [FreeTextEntry3] : Clinician's first session with patient. In-person sessions are not clinically indicated at this time. [FreeTextEntry1] : Initiation of ongoing weekly individual psychotherapy.

## 2024-08-01 ENCOUNTER — OUTPATIENT (OUTPATIENT)
Dept: OUTPATIENT SERVICES | Facility: HOSPITAL | Age: 24
LOS: 1 days | Discharge: HOME | End: 2024-08-01

## 2024-08-05 ENCOUNTER — APPOINTMENT (OUTPATIENT)
Dept: PSYCHIATRY | Facility: CLINIC | Age: 24
End: 2024-08-05

## 2024-08-05 NOTE — PHYSICAL EXAM
[Cooperative] : cooperative [Depressed] : depressed [Full] : full [Clear] : clear [Linear/Goal Directed] : linear/goal directed [None] : none [None Reported] : none reported [Average] : average [WNL] : within normal limits [FreeTextEntry8] : Patient expressed guilt and shame about maladaptive coping strategies.

## 2024-08-05 NOTE — REASON FOR VISIT
[Patient preference] : as per patient preference [Continuing, patient not seen in-person within last 12 months (provide details below)] : Telehealth services are continuing, patient not seen in-person within last 12 months.  [Telehealth (audio & video) - Individual/Group] : This visit was provided via telehealth using real-time 2-way audio visual technology. [Medical Office: ([facility])___] : The provider was located at the medical office in [unfilled]. [Home] : The patient, [unfilled], was located at home, [unfilled], at the time of the visit. [Verbal consent obtained from patient/other participant(s)] : Verbal consent for telehealth/telephonic services obtained from patient/other participant(s) [FreeTextEntry4] : 3:00 PM [FreeTextEntry5] : 3:45 PM [FreeTextEntry2] : N/A [FreeTextEntry3] : In-person sessions are not clinically indicated at this time. [Patient] : Patient [FreeTextEntry1] : Initiation of ongoing weekly individual psychotherapy.

## 2024-08-05 NOTE — PLAN
[FreeTextEntry2] : Patient and clinician will continue once weekly individual psychodynamic psychotherapy. [Motivational Interviewing] : Motivational Interviewing  [Psychodynamic Therapy] : Psychodynamic Therapy  [de-identified] : Patient arrived on time to his virtual individual psychotherapy session. Patient expressed guilt, shame, and sadness about recent engagement in maladaptive coping strategies and the impact on his parents. Patient and clinician discussed cycle of avoidance and difficult feelings, as well as difficulties with self-compassion. In addition, patient and clinician examined feelings of overwhelm in the face of desired changes and fear of failure. Clinician gently challenged laziness and all-or-nothing thinking and engaged in active listening and validation to facilitate the development of a strong therapeutic alliance. Patient left the session in good emotional and behavioral control.  [Recommended Frequency of Visits: ____] : Recommended frequency of visits: [unfilled] [Return in ____ week(s)] : Return in [unfilled] week(s) [FreeTextEntry1] : Patient and clinician will meet in one week to continue once weekly individual psychodynamic psychotherapy.

## 2024-08-05 NOTE — PLAN
[FreeTextEntry2] : Patient and clinician will continue once weekly individual psychodynamic psychotherapy. [Motivational Interviewing] : Motivational Interviewing  [Psychodynamic Therapy] : Psychodynamic Therapy  [de-identified] : Patient arrived on time to his virtual individual psychotherapy session. Patient expressed guilt, shame, and sadness about recent engagement in maladaptive coping strategies and the impact on his parents. Patient and clinician discussed cycle of avoidance and difficult feelings, as well as difficulties with self-compassion. In addition, patient and clinician examined feelings of overwhelm in the face of desired changes and fear of failure. Clinician gently challenged laziness and all-or-nothing thinking and engaged in active listening and validation to facilitate the development of a strong therapeutic alliance. Patient left the session in good emotional and behavioral control.  [Recommended Frequency of Visits: ____] : Recommended frequency of visits: [unfilled] [Return in ____ week(s)] : Return in [unfilled] week(s) [FreeTextEntry1] : Patient and clinician will meet in one week to continue once weekly individual psychodynamic psychotherapy.

## 2024-08-05 NOTE — REASON FOR VISIT
[Patient preference] : as per patient preference [Continuing, patient not seen in-person within last 12 months (provide details below)] : Telehealth services are continuing, patient not seen in-person within last 12 months.  [Telehealth (audio & video) - Individual/Group] : This visit was provided via telehealth using real-time 2-way audio visual technology. [Medical Office: ([facility])___] : The provider was located at the medical office in [unfilled]. [Home] : The patient, [unfilled], was located at home, [unfilled], at the time of the visit. [Verbal consent obtained from patient/other participant(s)] : Verbal consent for telehealth/telephonic services obtained from patient/other participant(s) [FreeTextEntry4] : 3:00 PM [FreeTextEntry5] : 3:45 PM [FreeTextEntry2] : N/A [FreeTextEntry3] : In-person sessions are not clinically indicated at this time. [Patient] : Patient [FreeTextEntry1] : Initiation of ongoing weekly individual psychotherapy.

## 2024-08-05 NOTE — REASON FOR VISIT
[Patient preference] : as per patient preference [Continuing, patient not seen in-person within last 12 months (provide details below)] : Telehealth services are continuing, patient not seen in-person within last 12 months.  [Telehealth (audio & video) - Individual/Group] : This visit was provided via telehealth using real-time 2-way audio visual technology. [Medical Office: ([facility])___] : The provider was located at the medical office in [unfilled]. [Home] : The patient, [unfilled], was located at home, [unfilled], at the time of the visit. [Verbal consent obtained from patient/other participant(s)] : Verbal consent for telehealth/telephonic services obtained from patient/other participant(s) [FreeTextEntry4] : 3:00 PM [FreeTextEntry5] : 3:45 PM [FreeTextEntry2] : N/A [FreeTextEntry3] : In-person sessions are not clinically indicated at this time. [Patient] : Patient [FreeTextEntry1] : Initiation of ongoing weekly individual psychotherapy.

## 2024-08-05 NOTE — PLAN
[FreeTextEntry2] : Patient and clinician will continue once weekly individual psychodynamic psychotherapy. [Motivational Interviewing] : Motivational Interviewing  [Psychodynamic Therapy] : Psychodynamic Therapy  [de-identified] : Patient arrived on time to his virtual individual psychotherapy session. Patient expressed guilt, shame, and sadness about recent engagement in maladaptive coping strategies and the impact on his parents. Patient and clinician discussed cycle of avoidance and difficult feelings, as well as difficulties with self-compassion. In addition, patient and clinician examined feelings of overwhelm in the face of desired changes and fear of failure. Clinician gently challenged laziness and all-or-nothing thinking and engaged in active listening and validation to facilitate the development of a strong therapeutic alliance. Patient left the session in good emotional and behavioral control.  [Recommended Frequency of Visits: ____] : Recommended frequency of visits: [unfilled] [Return in ____ week(s)] : Return in [unfilled] week(s) [FreeTextEntry1] : Patient and clinician will meet in one week to continue once weekly individual psychodynamic psychotherapy.

## 2024-08-19 ENCOUNTER — APPOINTMENT (OUTPATIENT)
Dept: PSYCHIATRY | Facility: CLINIC | Age: 24
End: 2024-08-19

## 2024-08-21 NOTE — END OF VISIT
[Duration of Psychotherapy Visit (minutes spent in synchronous communication): ____] : Duration of Psychotherapy Visit (minutes spent in synchronous communication): [unfilled] [Individual Psychotherapy for 38-52 minutes] : Individual Psychotherapy for 38 - 52 minutes [Teletherapy Service Provided] : The services provided in this session were delivered via tele-therapy [FreeTextEntry3] : Home in [place], [place] [FreeTextEntry5] : [facility] in [place]

## 2024-08-21 NOTE — REASON FOR VISIT
[Patient preference] : as per patient preference [Access issues (e.g., transportation, impaired mobility, etc.)] : due to patient's access issues [Continuing, patient not seen in-person within last 12 months (provide details below)] : Telehealth services are continuing, patient not seen in-person within last 12 months.  [Telehealth (audio & video) - Individual/Group] : This visit was provided via telehealth using real-time 2-way audio visual technology. [Medical Office: ([facility])___] : The provider was located at the medical office in [unfilled]. [Home] : The patient, [unfilled], was located at home, [unfilled], at the time of the visit. [Verbal consent obtained from patient/other participant(s)] : Verbal consent for telehealth/telephonic services obtained from patient/other participant(s) [Patient] : Patient [FreeTextEntry4] : 3:00 PM [FreeTextEntry5] : 3:45 PM [FreeTextEntry3] : In-person sessions are not clinically indicated at this time. Patient prefers virtual sessions due to access issues. [FreeTextEntry1] : Patient is seeking treatment to address symptoms of anxiety and depression and build self-confidence and individuation.

## 2024-08-21 NOTE — REASON FOR VISIT
[Patient preference] : as per patient preference [Access issues (e.g., transportation, impaired mobility, etc.)] : due to patient's access issues [Continuing, patient not seen in-person within last 12 months (provide details below)] : Telehealth services are continuing, patient not seen in-person within last 12 months.  [Telehealth (audio & video) - Individual/Group] : This visit was provided via telehealth using real-time 2-way audio visual technology. [Medical Office: ([facility])___] : The provider was located at the medical office in [unfilled]. [Home] : The patient, [unfilled], was located at home, [unfilled], at the time of the visit. [Verbal consent obtained from patient/other participant(s)] : Verbal consent for telehealth/telephonic services obtained from patient/other participant(s) [Patient] : Patient [FreeTextEntry4] : 3:00 PM [FreeTextEntry5] : 3:45 PM [FreeTextEntry3] : In-person sessions are not clinically indicated at this time. Patient prefers virtual sessions due to access issues. [FreeTextEntry1] : Patient is seeking treatment to address symptoms of anxiety and depression and build self-confidence and individuation.

## 2024-08-21 NOTE — PLAN
[Psychodynamic Therapy] : Psychodynamic Therapy  [Recommended Frequency of Visits: ____] : Recommended frequency of visits: [unfilled] [Return in ____ week(s)] : Return in [unfilled] week(s) [FreeTextEntry2] : PROBLEM I: symptoms of anxiety and depression Goal: "Increase motivation and productivity, and work towards goals" Objective A: identify and process the fears that drive patient's avoidance Objective B: identify and strengthen patient's engagement in adaptive coping strategies, and decrease his reliance on maladaptive coping strategies Objective C: break down overwhelming goals into smaller, more manageable tasks  PROBLEM II: self-confidence and individuation Goal: "Discover who I am" Objective A: increase patient's self-compassion, especially relating to his mental health and perceived "failures" Objective B: increase patient's awareness of his own emotions and desires, independent of those of others Objective C: increase patient's ability to voice his own emotions and desires, especially if he disagrees with others  DIAGNOSES: Major Depressive Disorder, recurrent, moderate (F33.1) Generalized Anxiety Disorder (F41.1) Attention-Deficit/Hyperactivity Disorder, 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 Patient reported decreased reliance on a previously discussed maladaptive coping strategy (gina) and increased reliance on a more adaptive coping strategy (physical exercise). Patient and clinician processed a recent experience with his mother and examined the impact of her emotions on his ability to regulate his own emotions. Patient expressed disbelief and frustration with his mother's decision-making processes. Clinician engaged in active listening and validation to facilitate the development of a strong therapeutic alliance. Problem I, Objective B and Problem II, Objective B were addressed. Patient ended the session in good emotional and behavioral control. No suicidal ideation, homicidal ideation, auditory/visual hallucinations, delusions, psychosis, or mian was evidenced or endorsed. [FreeTextEntry1] : Patient will continue to engage with the following mental health services: - once weekly individual psychodynamic psychotherapy with MARIE Shepard, [facility] - treatment plan review in February 2025 - monthly or twice monthly psychiatric medication management with Aysha Meyer MD

## 2024-08-21 NOTE — PLAN
[Psychodynamic Therapy] : Psychodynamic Therapy  [Recommended Frequency of Visits: ____] : Recommended frequency of visits: [unfilled] [Return in ____ week(s)] : Return in [unfilled] week(s) [FreeTextEntry2] : PROBLEM I: symptoms of anxiety and depression Goal: "Increase motivation and productivity, and work towards goals" Objective A: identify and process the fears that drive patient's avoidance Objective B: identify and strengthen patient's engagement in adaptive coping strategies, and decrease his reliance on maladaptive coping strategies Objective C: break down overwhelming goals into smaller, more manageable tasks  PROBLEM II: self-confidence and individuation Goal: "Discover who I am" Objective A: increase patient's self-compassion, especially relating to his mental health and perceived "failures" Objective B: increase patient's awareness of his own emotions and desires, independent of those of others Objective C: increase patient's ability to voice his own emotions and desires, especially if he disagrees with others  DIAGNOSES: Major Depressive Disorder, recurrent, moderate (F33.1) Generalized Anxiety Disorder (F41.1) Attention-Deficit/Hyperactivity Disorder, 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 Patient reported decreased reliance on a previously discussed maladaptive coping strategy (gina) and increased reliance on a more adaptive coping strategy (physical exercise). Patient and clinician processed a recent experience with his mother and examined the impact of her emotions on his ability to regulate his own emotions. Patient expressed disbelief and frustration with his mother's decision-making processes. Clinician engaged in active listening and validation to facilitate the development of a strong therapeutic alliance. Problem I, Objective B and Problem II, Objective B were addressed. Patient ended the session in good emotional and behavioral control. No suicidal ideation, homicidal ideation, auditory/visual hallucinations, delusions, psychosis, or mian was evidenced or endorsed. [FreeTextEntry1] : Patient will continue to engage with the following mental health services: - once weekly individual psychodynamic psychotherapy with MARIE Shepard, [facility] - treatment plan review in February 2025 - monthly or twice monthly psychiatric medication management with Aysha Meyer MD

## 2024-08-21 NOTE — ADDENDUM
[FreeTextEntry1] : Clinician will review this psychotherapy session with community supervisor, Mychal Jimenez, PhD, in individual supervision.  Supervision Method: case presentation Supervision Focus: Diagnosis and assessment, intervention

## 2024-08-21 NOTE — DISCUSSION/SUMMARY
[FreeTextEntry1] : Patient is a 23-year-old  (Black, ), cisgender, heterosexual male who presented to individual psychotherapy to address symptoms of anxiety and depression and build self-confidence and individuation. He is the only child of  parents, currently resides with his father, and attends [employer] Patient is taking the upcoming fall semester off. Per chart review, he has a PPH of MDD, IZZY, and ADHD and is currently prescribed fluoxetine 40mg QD. He and his psychiatrist recently decided to pause amphetamine-dextroamphetamine ER 20mg QD due to the national shortage and the break from college. Patient has a history of passive SI, with no history of psychiatric ER visit/hospitalizations or suicide attempts. Patient has no significant PMH. Treatment objectives include nurturing self-compassion, examining the function of avoidance, strengthening adaptive coping strategies, and increasing awareness and expression of his own emotions and desires. Treatment objectives will continue to be addressed using once weekly individual psychodynamic psychotherapy with MARIE Shepard, [facility] and monthly or twice monthly psychiatric medication management with Aysha Meyer MD.  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

## 2024-08-21 NOTE — PLAN
[Psychodynamic Therapy] : Psychodynamic Therapy  [Recommended Frequency of Visits: ____] : Recommended frequency of visits: [unfilled] [Return in ____ week(s)] : Return in [unfilled] week(s) [FreeTextEntry2] : PROBLEM I: symptoms of anxiety and depression Goal: "Increase motivation and productivity, and work towards goals" Objective A: identify and process the fears that drive patient's avoidance Objective B: identify and strengthen patient's engagement in adaptive coping strategies, and decrease his reliance on maladaptive coping strategies Objective C: break down overwhelming goals into smaller, more manageable tasks  PROBLEM II: self-confidence and individuation Goal: "Discover who I am" Objective A: increase patient's self-compassion, especially relating to his mental health and perceived "failures" Objective B: increase patient's awareness of his own emotions and desires, independent of those of others Objective C: increase patient's ability to voice his own emotions and desires, especially if he disagrees with others  DIAGNOSES: Major Depressive Disorder, recurrent, moderate (F33.1) Generalized Anxiety Disorder (F41.1) Attention-Deficit/Hyperactivity Disorder, 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 Patient reported decreased reliance on a previously discussed maladaptive coping strategy (gina) and increased reliance on a more adaptive coping strategy (physical exercise). Patient and clinician processed a recent experience with his mother and examined the impact of her emotions on his ability to regulate his own emotions. Patient expressed disbelief and frustration with his mother's decision-making processes. Clinician engaged in active listening and validation to facilitate the development of a strong therapeutic alliance. Problem I, Objective B and Problem II, Objective B were addressed. Patient ended the session in good emotional and behavioral control. No suicidal ideation, homicidal ideation, auditory/visual hallucinations, delusions, psychosis, or mian was evidenced or endorsed. [FreeTextEntry1] : Patient will continue to engage with the following mental health services: - once weekly individual psychodynamic psychotherapy with MARIE Shepard, [facility] - treatment plan review in February 2025 - monthly or twice monthly psychiatric medication management with Aysha Meyer MD

## 2024-08-21 NOTE — END OF VISIT
[Duration of Psychotherapy Visit (minutes spent in synchronous communication): ____] : Duration of Psychotherapy Visit (minutes spent in synchronous communication): [unfilled] [Individual Psychotherapy for 38-52 minutes] : Individual Psychotherapy for 38 - 52 minutes [Teletherapy Service Provided] : The services provided in this session were delivered via tele-therapy [FreeTextEntry3] : Home in [place], [place] [FreeTextEntry5] : [facility] in [place]

## 2024-08-21 NOTE — REASON FOR VISIT
[Patient preference] : as per patient preference [Access issues (e.g., transportation, impaired mobility, etc.)] : due to patient's access issues [Continuing, patient not seen in-person within last 12 months (provide details below)] : Telehealth services are continuing, patient not seen in-person within last 12 months.  [Telehealth (audio & video) - Individual/Group] : This visit was provided via telehealth using real-time 2-way audio visual technology. [Medical Office: ([facility])___] : The provider was located at the medical office in [unfilled]. [Home] : The patient, [unfilled], was located at home, [unfilled], at the time of the visit. [Verbal consent obtained from patient/other participant(s)] : Verbal consent for telehealth/telephonic services obtained from patient/other participant(s) [Patient] : Patient [FreeTextEntry4] : 3:00 PM [FreeTextEntry5] : 3:45 PM [FreeTextEntry3] : In-person sessions are not clinically indicated at this time. Patient prefers virtual sessions due to access issues. [FreeTextEntry1] : Patient is seeking treatment to address symptoms of anxiety and depression and build self-confidence and individuation.

## 2024-08-21 NOTE — RISK ASSESSMENT
[FreeTextEntry8] :  Suicidal ideation, method, plan, and intent were not evidenced or endorsed. [FreeTextEntry7] :  Homicidal ideation, method, plan, and intent were not evidenced or endorsed.

## 2024-08-21 NOTE — END OF VISIT
[Duration of Psychotherapy Visit (minutes spent in synchronous communication): ____] : Duration of Psychotherapy Visit (minutes spent in synchronous communication): [unfilled] [Individual Psychotherapy for 38-52 minutes] : Individual Psychotherapy for 38 - 52 minutes [Teletherapy Service Provided] : The services provided in this session were delivered via tele-therapy [FreeTextEntry3] : Home in [place], [place] [FreeTextEntry5] : [facility] Mason [facility] in [place]

## 2024-08-21 NOTE — DISCUSSION/SUMMARY
[FreeTextEntry1] : Patient is a 23-year-old  (Black, ), cisgender, heterosexual male who presented to individual psychotherapy to address symptoms of anxiety and depression and build self-confidence and individuation. He is the only child of  parents, currently resides with his father, and attends [employer]. Patient is taking the upcoming fall semester off. Per chart review, he has a PPH of MDD, IZZY, and ADHD and is currently prescribed fluoxetine 40mg QD. He and his psychiatrist recently decided to pause amphetamine-dextroamphetamine ER 20mg QD due to the national shortage and the break from college. Patient has a history of passive SI, with no history of psychiatric ER visit/hospitalizations or suicide attempts. Patient has no significant PMH. Treatment objectives include nurturing self-compassion, examining the function of avoidance, strengthening adaptive coping strategies, and increasing awareness and expression of his own emotions and desires. Treatment objectives will continue to be addressed using once weekly individual psychodynamic psychotherapy with MARIE Shepard, [facility] and monthly or twice monthly psychiatric medication management with Aysha Meyer MD.  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

## 2024-08-21 NOTE — DISCUSSION/SUMMARY
[FreeTextEntry1] : Patient is a 23-year-old  (Black, ), cisgender, heterosexual male who presented to individual psychotherapy to address symptoms of anxiety and depression and build self-confidence and individuation. He is the only child of  parents, currently resides with his father, and attends [employer]. Patient is taking the upcoming fall semester off. Per chart review, he has a PPH of MDD, IZZY, and ADHD and is currently prescribed fluoxetine 40mg QD. He and his psychiatrist recently decided to pause amphetamine-dextroamphetamine ER 20mg QD due to the national shortage and the break from college. Patient has a history of passive SI, with no history of psychiatric ER visit/hospitalizations or suicide attempts. Patient has no significant PMH. Treatment objectives include nurturing self-compassion, examining the function of avoidance, strengthening adaptive coping strategies, and increasing awareness and expression of his own emotions and desires. Treatment objectives will continue to be addressed using once weekly individual psychodynamic psychotherapy with MARIE Shepard, [facility] and monthly or twice monthly psychiatric medication management with Aysha Meyer MD.  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

## 2024-08-23 DIAGNOSIS — F41.1 GENERALIZED ANXIETY DISORDER: ICD-10-CM

## 2024-08-23 DIAGNOSIS — F90.0 ATTENTION-DEFICIT HYPERACTIVITY DISORDER, PREDOMINANTLY INATTENTIVE TYPE: ICD-10-CM

## 2024-08-23 DIAGNOSIS — F33.1 MAJOR DEPRESSIVE DISORDER, RECURRENT, MODERATE: ICD-10-CM

## 2024-08-23 DIAGNOSIS — F60.0 PARANOID PERSONALITY DISORDER: ICD-10-CM

## 2024-08-23 DIAGNOSIS — F50.9 EATING DISORDER, UNSPECIFIED: ICD-10-CM

## 2024-08-26 ENCOUNTER — APPOINTMENT (OUTPATIENT)
Dept: PSYCHIATRY | Facility: CLINIC | Age: 24
End: 2024-08-26

## 2024-08-27 NOTE — DISCUSSION/SUMMARY
[FreeTextEntry1] : Patient is a 23-year-old  (Black, ), cisgender, heterosexual male who presented to individual psychotherapy to address symptoms of anxiety and depression and build self-confidence and individuation. He is the only child of  parents, currently resides with his father, and attends [employer]. Patient is taking the upcoming fall semester off. Per chart review, he has a PPH of MDD, IZZY, and ADHD and is currently prescribed fluoxetine 40mg QD. He and his psychiatrist recently decided to pause amphetamine-dextroamphetamine ER 20mg QD due to the national shortage and the break from college. Patient has a history of passive SI, with no history of psychiatric ER visit/hospitalizations or suicide attempts. Patient has no significant PMH. Treatment objectives include nurturing self-compassion, examining the function of avoidance, strengthening adaptive coping strategies, and increasing awareness and expression of his own emotions and desires. Treatment objectives will continue to be addressed using once weekly individual psychodynamic psychotherapy with MARIE Shepard, [facility] and monthly or twice monthly psychiatric medication management with Aysha Meyer MD.  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

## 2024-08-27 NOTE — PLAN
[Psychodynamic Therapy] : Psychodynamic Therapy  [Recommended Frequency of Visits: ____] : Recommended frequency of visits: [unfilled] [Return in ____ week(s)] : Return in [unfilled] week(s) [FreeTextEntry2] : PROBLEM I: symptoms of anxiety and depression Goal: "Increase motivation and productivity, and work towards goals" Objective A: identify and process the fears that drive patient's avoidance Objective B: identify and strengthen patient's engagement in adaptive coping strategies, and decrease his reliance on maladaptive coping strategies Objective C: break down overwhelming goals into smaller, more manageable tasks  PROBLEM II: self-confidence and individuation Goal: "Discover who I am" Objective A: increase patient's self-compassion, especially relating to his mental health and perceived "failures" Objective B: increase patient's awareness of his own emotions and desires, independent of those of others Objective C: increase patient's ability to voice his own emotions and desires, especially if he disagrees with others  DIAGNOSES: Major Depressive Disorder, recurrent, moderate (F33.1) Generalized Anxiety Disorder (F41.1) Attention-Deficit/Hyperactivity Disorder, 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 Patient reported increased anxiety, related to starting a new job this week. Patient expressed excitement because it is within his chosen field, and anxiety about his ability to fulfill the job demands. In particular, he expressed anxiety about presenting in front of others and having important meetings with stakeholders. Patient reported that he physically experiences anxiety as chest tightness, difficulty breathing, and light sensitivity. Patient identified pacing as a helpful coping strategy. Patient and clinician processed his experiences with ADHD and the difficulty of functioning without his prescribed Adderall, due to the national shortage. Patient identified body-doubling as a helpful strategy to facilitate his ability to stay on task. Clinician engaged in active listening and validation to facilitate the development of a strong therapeutic alliance. Problem I, Objectives B and C and Problem II, Objective B were addressed. Patient ended the session in good emotional and behavioral control. No suicidal ideation, homicidal ideation, auditory/visual hallucinations, delusions, psychosis, or mian were evidenced or endorsed. [FreeTextEntry1] : Patient will continue to engage with the following mental health services: - once weekly individual psychodynamic psychotherapy with MARIE Shepard, [facility] - treatment plan review in February 2025 - monthly or twice monthly psychiatric medication management with Aysha Meyer MD

## 2024-08-27 NOTE — REASON FOR VISIT
[Patient preference] : as per patient preference [Access issues (e.g., transportation, impaired mobility, etc.)] : due to patient's access issues [Continuing, patient not seen in-person within last 12 months (provide details below)] : Telehealth services are continuing, patient not seen in-person within last 12 months.  [Telehealth (audio & video) - Individual/Group] : This visit was provided via telehealth using real-time 2-way audio visual technology. [Medical Office: ([facility])___] : The provider was located at the medical office in [unfilled]. [Home] : The patient, [unfilled], was located at home, [unfilled], at the time of the visit. [Verbal consent obtained from patient/other participant(s)] : Verbal consent for telehealth/telephonic services obtained from patient/other participant(s) [Patient] : Patient [FreeTextEntry4] : 3:00 PM [FreeTextEntry5] : 3:45 PM [FreeTextEntry3] : In-person sessions are not clinically indicated at this time. Patient prefers virtual sessions due to access issues. [FreeTextEntry1] : Patient is seeking treatment to address symptoms of anxiety and depression and build self-confidence and individuation.

## 2024-08-27 NOTE — REASON FOR VISIT
[Patient preference] : as per patient preference [Access issues (e.g., transportation, impaired mobility, etc.)] : due to patient's access issues [Continuing, patient not seen in-person within last 12 months (provide details below)] : Telehealth services are continuing, patient not seen in-person within last 12 months.  [Telehealth (audio & video) - Individual/Group] : This visit was provided via telehealth using real-time 2-way audio visual technology. [Medical Office: ([facility])___] : The provider was located at the medical office in [unfilled]. [Home] : The patient, [unfilled], was located at home, [unfilled], at the time of the visit. [Verbal consent obtained from patient/other participant(s)] : Verbal consent for telehealth/telephonic services obtained from patient/other participant(s) [Patient] : Patient [FreeTextEntry4] : 3:00 PM [FreeTextEntry5] : 3:45 PM [FreeTextEntry3] : In-person sessions are not clinically indicated at this time. Patient prefers virtual sessions due to access issues. [FreeTextEntry1] : Patient is seeking treatment to address symptoms of anxiety and depression and build self-confidence and individuation.

## 2024-08-27 NOTE — DISCUSSION/SUMMARY
[FreeTextEntry1] : Patient is a 23-year-old  (Black, ), cisgender, heterosexual male who presented to individual psychotherapy to address symptoms of anxiety and depression and build self-confidence and individuation. He is the only child of  parents, currently resides with his father, and attends [employer]. Patient is taking the upcoming fall semester off. Per chart review, he has a PPH of MDD, IZZY, and ADHD and is currently prescribed fluoxetine 40mg QD. He and his psychiatrist recently decided to pause amphetamine-dextroamphetamine ER 20mg QD due to the national shortage and the break from college. Patient has a history of passive SI, with no history of psychiatric ER visit/hospitalizations or suicide attempts. Patient has no significant PMH. Treatment objectives include nurturing self-compassion, examining the function of avoidance, strengthening adaptive coping strategies, and increasing awareness and expression of his own emotions and desires. Treatment objectives will continue to be addressed using once weekly individual psychodynamic psychotherapy with MARIE Shepard, [facility] and monthly or twice monthly psychiatric medication management with Aysha Meyer MD.  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

## 2024-08-27 NOTE — DISCUSSION/SUMMARY
[FreeTextEntry1] : Patient is a 23-year-old  (Black, ), cisgender, heterosexual male who presented to individual psychotherapy to address symptoms of anxiety and depression and build self-confidence and individuation. He is the only child of  parents, currently resides with his father, and attends [employer]. Patient is taking the upcoming fall semester off. Per chart review, he has a PPH of MDD, IZZY, and ADHD and is currently prescribed fluoxetine 40mg QD. He and his psychiatrist recently decided to pause amphetamine-dextroamphetamine ER 20mg QD due to the national shortage and the break from college. Patient has a history of passive SI, with no history of psychiatric ER visit/hospitalizations or suicide attempts. Patient has no significant PMH. Treatment objectives include nurturing self-compassion, examining the function of avoidance, strengthening adaptive coping strategies, and increasing awareness and expression of his own emotions and desires. Treatment objectives will continue to be addressed using once weekly individual psychodynamic psychotherapy with MARIE Shepard, [facility] and monthly or twice monthly psychiatric medication management with Aysha Meyer MD.  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

## 2024-08-27 NOTE — PLAN
[Psychodynamic Therapy] : Psychodynamic Therapy  [Recommended Frequency of Visits: ____] : Recommended frequency of visits: [unfilled] [Return in ____ week(s)] : Return in [unfilled] week(s) [FreeTextEntry2] : PROBLEM I: symptoms of anxiety and depression Goal: "Increase motivation and productivity, and work towards goals" Objective A: identify and process the fears that drive patient's avoidance Objective B: identify and strengthen patient's engagement in adaptive coping strategies, and decrease his reliance on maladaptive coping strategies Objective C: break down overwhelming goals into smaller, more manageable tasks  PROBLEM II: self-confidence and individuation Goal: "Discover who I am" Objective A: increase patient's self-compassion, especially relating to his mental health and perceived "failures" Objective B: increase patient's awareness of his own emotions and desires, independent of those of others Objective C: increase patient's ability to voice his own emotions and desires, especially if he disagrees with others  DIAGNOSES: Major Depressive Disorder, recurrent, moderate (F33.1) Generalized Anxiety Disorder (F41.1) Attention-Deficit/Hyperactivity Disorder, 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 Patient reported increased anxiety, related to starting a new job this week. Patient expressed excitement because it is within his chosen field, and anxiety about his ability to fulfill the job demands. In particular, he expressed anxiety about presenting in front of others and having important meetings with stakeholders. Patient reported that he physically experiences anxiety as chest tightness, difficulty breathing, and light sensitivity. Patient identified pacing as a helpful coping strategy. Patient and clinician processed his experiences with ADHD and the difficulty of functioning without his prescribed Adderall, due to the national shortage. Patient identified body-doubling as a helpful strategy to facilitate his ability to stay on task. Clinician engaged in active listening and validation to facilitate the development of a strong therapeutic alliance. Problem I, Objectives B and C and Problem II, Objective B were addressed. Patient ended the session in good emotional and behavioral control. No suicidal ideation, homicidal ideation, auditory/visual hallucinations, delusions, psychosis, or mian were evidenced or endorsed. [FreeTextEntry1] : Patient will continue to engage with the following mental health services: - once weekly individual psychodynamic psychotherapy with MARIE Shepard, [facility] - treatment plan review in February 2025 - monthly or twice monthly psychiatric medication management with Aysha Meyer MD

## 2024-08-27 NOTE — END OF VISIT
[Duration of Psychotherapy Visit (minutes spent in synchronous communication): ____] : Duration of Psychotherapy Visit (minutes spent in synchronous communication): [unfilled] [Individual Psychotherapy for 38-52 minutes] : Individual Psychotherapy for 38 - 52 minutes [Teletherapy Service Provided] : The services provided in this session were delivered via tele-therapy [FreeTextEntry3] : Home in [place], [place] [FreeTextEntry5] : Kanu Mason [facility] in [place]

## 2024-08-27 NOTE — ADDENDUM
[FreeTextEntry1] : Clinician will review this psychotherapy session with community supervisor, Mychal Jimenez, PhD, in individual supervision.  Supervision Method: case presentation Supervision Focus: assessment, intervention

## 2024-08-27 NOTE — PHYSICAL EXAM
[Well groomed] : well groomed [Cooperative] : cooperative [Anxious] : anxious [Full] : full [Clear] : clear [Linear/Goal Directed] : linear/goal directed [None] : none [None Reported] : none reported [Average] : average [WNL] : within normal limits [FreeTextEntry8] : Patient expressed some anxiety about starting a new job.

## 2024-08-27 NOTE — END OF VISIT
[Duration of Psychotherapy Visit (minutes spent in synchronous communication): ____] : Duration of Psychotherapy Visit (minutes spent in synchronous communication): [unfilled] [Individual Psychotherapy for 38-52 minutes] : Individual Psychotherapy for 38 - 52 minutes [Teletherapy Service Provided] : The services provided in this session were delivered via tele-therapy [FreeTextEntry3] : Home in [place], [place] [FreeTextEntry5] : [facility] in [place]

## 2024-08-27 NOTE — END OF VISIT
[Duration of Psychotherapy Visit (minutes spent in synchronous communication): ____] : Duration of Psychotherapy Visit (minutes spent in synchronous communication): [unfilled] [Individual Psychotherapy for 38-52 minutes] : Individual Psychotherapy for 38 - 52 minutes [Teletherapy Service Provided] : The services provided in this session were delivered via tele-therapy [FreeTextEntry3] : Home in [place], [place] [FreeTextEntry5] : [facility] Mason [facility] in [place]

## 2024-08-27 NOTE — REASON FOR VISIT
[Patient preference] : as per patient preference [Access issues (e.g., transportation, impaired mobility, etc.)] : due to patient's access issues [Continuing, patient not seen in-person within last 12 months (provide details below)] : Telehealth services are continuing, patient not seen in-person within last 12 months.  [Telehealth (audio & video) - Individual/Group] : This visit was provided via telehealth using real-time 2-way audio visual technology. [Medical Office: ([facility])___] : The provider was located at the medical office in [unfilled]. [Home] : The patient, [unfilled], was located at home, [unfilled], at the time of the visit. [Verbal consent obtained from patient/other participant(s)] : Verbal consent for telehealth/telephonic services obtained from patient/other participant(s) [Patient] : Patient [FreeTextEntry4] : 3:00 PM [FreeTextEntry5] : 3:45 PM [FreeTextEntry3] : In-person sessions are not clinically indicated at this time. Patient prefers virtual sessions due to access issues. [FreeTextEntry1] : Patient is seeking treatment to address symptoms of anxiety and depression and build self-confidence and individuation.

## 2024-08-27 NOTE — PLAN
[Psychodynamic Therapy] : Psychodynamic Therapy  [Recommended Frequency of Visits: ____] : Recommended frequency of visits: [unfilled] [Return in ____ week(s)] : Return in [unfilled] week(s) [FreeTextEntry2] : PROBLEM I: symptoms of anxiety and depression Goal: "Increase motivation and productivity, and work towards goals" Objective A: identify and process the fears that drive patient's avoidance Objective B: identify and strengthen patient's engagement in adaptive coping strategies, and decrease his reliance on maladaptive coping strategies Objective C: break down overwhelming goals into smaller, more manageable tasks  PROBLEM II: self-confidence and individuation Goal: "Discover who I am" Objective A: increase patient's self-compassion, especially relating to his mental health and perceived "failures" Objective B: increase patient's awareness of his own emotions and desires, independent of those of others Objective C: increase patient's ability to voice his own emotions and desires, especially if he disagrees with others  DIAGNOSES: Major Depressive Disorder, recurrent, moderate (F33.1) Generalized Anxiety Disorder (F41.1) Attention-Deficit/Hyperactivity Disorder, 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 Patient reported increased anxiety, related to starting a new job this week. Patient expressed excitement because it is within his chosen field, and anxiety about his ability to fulfill the job demands. In particular, he expressed anxiety about presenting in front of others and having important meetings with stakeholders. Patient reported that he physically experiences anxiety as chest tightness, difficulty breathing, and light sensitivity. Patient identified pacing as a helpful coping strategy. Patient and clinician processed his experiences with ADHD and the difficulty of functioning without his prescribed Adderall, due to the national shortage. Patient identified body-doubling as a helpful strategy to facilitate his ability to stay on task. Clinician engaged in active listening and validation to facilitate the development of a strong therapeutic alliance. Problem I, Objectives B and C and Problem II, Objective B were addressed. Patient ended the session in good emotional and behavioral control. No suicidal ideation, homicidal ideation, auditory/visual hallucinations, delusions, psychosis, or mian were evidenced or endorsed. [FreeTextEntry1] : Patient will continue to engage with the following mental health services: - once weekly individual psychodynamic psychotherapy with MARIE Shepard, [facility] - treatment plan review in February 2025 - monthly or twice monthly psychiatric medication management with Aysha Meyer MD

## 2024-09-04 ENCOUNTER — APPOINTMENT (OUTPATIENT)
Dept: PSYCHIATRY | Facility: CLINIC | Age: 24
End: 2024-09-04

## 2024-09-04 NOTE — RISK ASSESSMENT
[FreeTextEntry8] : In the past month, patient reported experiencing suicidal ideation approximately once per week, lasting anywhere from 2 hours to half a day. Patient described discomfort when these thoughts occur, though did not endorse concern that he would act upon them. He denied method, plan, and intent and stated that he feels able to keep himself safe at this time. Patient stated that his level of suicidal ideation is related to consistency of adherence to psychiatric medications. [Yes] : Yes [No] : No [FreeTextEntry7] :  Homicidal ideation, method, plan, and intent were not evidenced or endorsed. [TextBox_32] : Patient reported experiencing suicidal ideation approximately once per week, lasting anywhere from 2 hours to half a day. Patient described discomfort when these thoughts occur, though did not endorse concern that he would act upon them. He denied method, plan, and intent. Patient stated that his level of suicidal ideation is related to consistency of adherence to psychiatric medications. Patient stated that he feels able to keep himself safe at this time.

## 2024-09-04 NOTE — END OF VISIT
[Duration of Psychotherapy Visit (minutes spent in synchronous communication): ____] : Duration of Psychotherapy Visit (minutes spent in synchronous communication): [unfilled] [Individual Psychotherapy for 38-52 minutes] : Individual Psychotherapy for 38 - 52 minutes [Teletherapy Service Provided] : The services provided in this session were delivered via tele-therapy [FreeTextEntry3] : Home in [place] [FreeTextEntry5] : [facility] in [place]

## 2024-09-04 NOTE — REASON FOR VISIT
[Patient preference] : as per patient preference [Access issues (e.g., transportation, impaired mobility, etc.)] : due to patient's access issues [Continuing, patient not seen in-person within last 12 months (provide details below)] : Telehealth services are continuing, patient not seen in-person within last 12 months.  [Telehealth (audio & video) - Individual/Group] : This visit was provided via telehealth using real-time 2-way audio visual technology. [Other Location: e.g. Home (Enter Location, City,State)___] : The provider was located at [unfilled]. [Home] : The patient, [unfilled], was located at home, [unfilled], at the time of the visit. [Verbal consent obtained from patient/other participant(s)] : Verbal consent for telehealth/telephonic services obtained from patient/other participant(s) [Patient] : Patient [FreeTextEntry5] : 9:45 AM [FreeTextEntry4] : 9:00 AM [FreeTextEntry3] : In-person sessions are not clinically indicated at this time. Patient prefers virtual sessions due to access issues. [FreeTextEntry1] : Patient is seeking treatment to address symptoms of anxiety and depression and build self-confidence and individuation.

## 2024-09-04 NOTE — PLAN
[Motivational Interviewing] : Motivational Interviewing  [Psychodynamic Therapy] : Psychodynamic Therapy  [Recommended Frequency of Visits: ____] : Recommended frequency of visits: [unfilled] [Return in ____ week(s)] : Return in [unfilled] week(s) [FreeTextEntry2] : PROBLEM I: symptoms of anxiety and depression Goal: "Increase motivation and productivity, and work towards goals" Objective A: identify and process the fears that drive patient's avoidance Objective B: identify and strengthen patient's engagement in adaptive coping strategies, and decrease his reliance on maladaptive coping strategies Objective C: break down overwhelming goals into smaller, more manageable tasks  PROBLEM II: self-confidence and individuation Goal: "Discover who I am" Objective A: increase patient's self-compassion, especially relating to his mental health and perceived "failures" Objective B: increase patient's awareness of his own emotions and desires, independent of those of others Objective C: increase patient's ability to voice his own emotions and desires, especially if he disagrees with others  DIAGNOSES: Major Depressive Disorder, recurrent, moderate (F33.1) Generalized Anxiety Disorder (F41.1) Attention-Deficit/Hyperactivity Disorder, 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 Patient and clinician reviewed treatment goals and completed the DAST-10, AUDIT, SOS-10, and C-SSRS. During the C-SSRS, patient endorsed approximately once weekly suicidal ideation, with no method, plan, or intent. Patient stated that he feels able to keep himself safe at this time. Patient reported increased anxiety, racing thoughts, and catastrophizing during an interpersonal rupture with a friend. He described regulating his anxiety by running his nails back and forth along his inner forearm. Patient did draw blood, though did not realize until after his anxiety had lessened. Clinician observed a white line of skin with some scabs on his left inner forearm. He explained that in the past he has regulated his anxiety by scraping his legs with his nails through his pockets. This is the first time he has scraped his arms and the first time he has drawn blood. Patient and clinician processed guilt about unintentionally hurting his friend, frustration with himself about not picking up on their nonverbal cues, and frustration with them about not communicating their boundaries clearly. Clinician engaged in active listening and validation and encouraged processing of the dialectic. Problem I, Objective B and Problem II, Objective B were addressed. Patient ended the session in good emotional and behavioral control. No homicidal ideation, auditory/visual hallucinations, delusions, psychosis, or mian were evidenced or endorsed.  [FreeTextEntry1] : Patient will continue to engage with the following mental health services: - once weekly individual psychodynamic psychotherapy with MARIE Shepard, [facility] - treatment plan review in February 2025 - monthly or twice monthly psychiatric medication management with Aysha Meyer MD

## 2024-09-04 NOTE — END OF VISIT
[Duration of Psychotherapy Visit (minutes spent in synchronous communication): ____] : Duration of Psychotherapy Visit (minutes spent in synchronous communication): [unfilled] [Individual Psychotherapy for 38-52 minutes] : Individual Psychotherapy for 38 - 52 minutes [Teletherapy Service Provided] : The services provided in this session were delivered via tele-therapy [FreeTextEntry3] : Home in [place], [place] [FreeTextEntry5] : [facility] in [place]

## 2024-09-04 NOTE — PLAN
[Motivational Interviewing] : Motivational Interviewing  [Psychodynamic Therapy] : Psychodynamic Therapy  [Recommended Frequency of Visits: ____] : Recommended frequency of visits: [unfilled] [Return in ____ week(s)] : Return in [unfilled] week(s) [FreeTextEntry2] : PROBLEM I: symptoms of anxiety and depression Goal: "Increase motivation and productivity, and work towards goals" Objective A: identify and process the fears that drive patient's avoidance Objective B: identify and strengthen patient's engagement in adaptive coping strategies, and decrease his reliance on maladaptive coping strategies Objective C: break down overwhelming goals into smaller, more manageable tasks  PROBLEM II: self-confidence and individuation Goal: "Discover who I am" Objective A: increase patient's self-compassion, especially relating to his mental health and perceived "failures" Objective B: increase patient's awareness of his own emotions and desires, independent of those of others Objective C: increase patient's ability to voice his own emotions and desires, especially if he disagrees with others  DIAGNOSES: Major Depressive Disorder, recurrent, moderate (F33.1) Generalized Anxiety Disorder (F41.1) Attention-Deficit/Hyperactivity Disorder, 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 Patient and clinician reviewed treatment goals and completed the DAST-10, AUDIT, SOS-10, and C-SSRS. During the C-SSRS, patient endorsed approximately once weekly suicidal ideation, with no method, plan, or intent. Patient stated that he feels able to keep himself safe at this time. Patient reported increased anxiety, racing thoughts, and catastrophizing during an interpersonal rupture with a friend. He described regulating his anxiety by running his nails back and forth along his inner forearm. Patient did draw blood, though did not realize until after his anxiety had lessened. Clinician observed a white line of skin with some scabs on his left inner forearm. He explained that in the past he has regulated his anxiety by scraping his legs with his nails through his pockets. This is the first time he has scraped his arms and the first time he has drawn blood. Patient and clinician processed guilt about unintentionally hurting his friend, frustration with himself about not picking up on their nonverbal cues, and frustration with them about not communicating their boundaries clearly. Clinician engaged in active listening and validation and encouraged processing of the dialectic. Problem I, Objective B and Problem II, Objective B were addressed. Patient ended the session in good emotional and behavioral control. No homicidal ideation, auditory/visual hallucinations, delusions, psychosis, or mian were evidenced or endorsed.  [FreeTextEntry1] : Patient will continue to engage with the following mental health services: - once weekly individual psychodynamic psychotherapy with MARIE Shepard, [facility] - treatment plan review in February 2025 - monthly or twice monthly psychiatric medication management with Aysha Meyer MD

## 2024-09-04 NOTE — PLAN
[Motivational Interviewing] : Motivational Interviewing  [Psychodynamic Therapy] : Psychodynamic Therapy  [Recommended Frequency of Visits: ____] : Recommended frequency of visits: [unfilled] [Return in ____ week(s)] : Return in [unfilled] week(s) [FreeTextEntry2] : PROBLEM I: symptoms of anxiety and depression Goal: "Increase motivation and productivity, and work towards goals" Objective A: identify and process the fears that drive patient's avoidance Objective B: identify and strengthen patient's engagement in adaptive coping strategies, and decrease his reliance on maladaptive coping strategies Objective C: break down overwhelming goals into smaller, more manageable tasks  PROBLEM II: self-confidence and individuation Goal: "Discover who I am" Objective A: increase patient's self-compassion, especially relating to his mental health and perceived "failures" Objective B: increase patient's awareness of his own emotions and desires, independent of those of others Objective C: increase patient's ability to voice his own emotions and desires, especially if he disagrees with others  DIAGNOSES: Major Depressive Disorder, recurrent, moderate (F33.1) Generalized Anxiety Disorder (F41.1) Attention-Deficit/Hyperactivity Disorder, 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 Patient and clinician reviewed treatment goals and completed the DAST-10, AUDIT, SOS-10, and C-SSRS. During the C-SSRS, patient endorsed approximately once weekly suicidal ideation, with no method, plan, or intent. Patient stated that he feels able to keep himself safe at this time. Patient reported increased anxiety, racing thoughts, and catastrophizing during an interpersonal rupture with a friend. He described regulating his anxiety by running his nails back and forth along his inner forearm. Patient did draw blood, though did not realize until after his anxiety had lessened. Clinician observed a white line of skin with some scabs on his left inner forearm. He explained that in the past he has regulated his anxiety by scraping his legs with his nails through his pockets. This is the first time he has scraped his arms and the first time he has drawn blood. Patient and clinician processed guilt about unintentionally hurting his friend, frustration with himself about not picking up on their nonverbal cues, and frustration with them about not communicating their boundaries clearly. Clinician engaged in active listening and validation and encouraged processing of the dialectic. Problem I, Objective B and Problem II, Objective B were addressed. Patient ended the session in good emotional and behavioral control. No homicidal ideation, auditory/visual hallucinations, delusions, psychosis, or mian were evidenced or endorsed.  [FreeTextEntry1] : Patient will continue to engage with the following mental health services: - once weekly individual psychodynamic psychotherapy with MARIE Shepard, [facility] - treatment plan review in February 2025 - monthly or twice monthly psychiatric medication management with Aysha Meyer MD

## 2024-09-04 NOTE — DISCUSSION/SUMMARY
[Plan Review] : Plan Review [Able to manage surrounding demands and opportunities] : able to manage surrounding demands and opportunities [Adherent to treatment recommendations] : adherent to treatment recommendations [Articulate] : articulate [Attempting to realize their potential] : Attempting to realize their potential [Cognitively intact] : cognitively intact [Insightful] : insightful [Intelligent] : intelligent [Motivated to participate in treatment] : motivated to participate in treatment [Motivated and ready for change] : motivated and ready for change [Health literacy] : health literacy [Motivated to maintain or improve physical health] : motivated to maintain or improve physical health [In good health] : in good health [Has vocational interests or hobbies] : has vocational interests or hobbies [Steady employment] : steady employment [Housing stability] : housing stability [High level of education] : high level of education [English fluency] : English fluency [Connected to healthcare] : connected to healthcare [Social supports] : social supports [Improvement in symptoms as evidenced by:] : Improvement in symptoms as evidenced by: [Yes] : Yes [Supervisor (if needed)] : Supervisor [Creative] : creative [Access to safe outdoor spaces] : access to safe outdoor spaces [FreeTextEntry2] : 02/01/2025 [FreeTextEntry3] : 07/21/2021 [Mental Health] : Mental Health [Initial] : Initial [every ___ months] : every [unfilled] months [___ times a week] : [unfilled] times a week [FreeTextEntry4] : "Discover who I am" [FreeTextEntry1] : self-confidence and individuation [de-identified] : increase patient's self-compassion, especially relating to his mental health and perceived "failures" [de-identified] : 02/01/2025 [de-identified] : increase patient's awareness of his own emotions and desires, independent of those of others [de-identified] : 02/01/2025 [FreeTextEntry5] : Relational psychodynamic psychotherapy [de-identified] : decrease symptoms of anxiety and depression, increased reliance on effective coping skills, increased independence

## 2024-09-04 NOTE — END OF VISIT
[Duration of Psychotherapy Visit (minutes spent in synchronous communication): ____] : Duration of Psychotherapy Visit (minutes spent in synchronous communication): [unfilled] [Individual Psychotherapy for 38-52 minutes] : Individual Psychotherapy for 38 - 52 minutes [Teletherapy Service Provided] : The services provided in this session were delivered via tele-therapy [FreeTextEntry3] : Home in [place], [place] [FreeTextEntry5] : [facility]u [facility] in [place], [place]

## 2024-09-04 NOTE — END OF VISIT
[Duration of Psychotherapy Visit (minutes spent in synchronous communication): ____] : Duration of Psychotherapy Visit (minutes spent in synchronous communication): [unfilled] [Individual Psychotherapy for 38-52 minutes] : Individual Psychotherapy for 38 - 52 minutes [Teletherapy Service Provided] : The services provided in this session were delivered via tele-therapy [FreeTextEntry3] : Home in [place], [place] [FreeTextEntry5] : [facility] in [place]

## 2024-09-04 NOTE — DISCUSSION/SUMMARY
[Plan Review] : Plan Review [Able to manage surrounding demands and opportunities] : able to manage surrounding demands and opportunities [Adherent to treatment recommendations] : adherent to treatment recommendations [Articulate] : articulate [Attempting to realize their potential] : Attempting to realize their potential [Cognitively intact] : cognitively intact [Insightful] : insightful [Intelligent] : intelligent [Motivated to participate in treatment] : motivated to participate in treatment [Motivated and ready for change] : motivated and ready for change [Health literacy] : health literacy [Motivated to maintain or improve physical health] : motivated to maintain or improve physical health [In good health] : in good health [Has vocational interests or hobbies] : has vocational interests or hobbies [Steady employment] : steady employment [Housing stability] : housing stability [High level of education] : high level of education [English fluency] : English fluency [Connected to healthcare] : connected to healthcare [Social supports] : social supports [Improvement in symptoms as evidenced by:] : Improvement in symptoms as evidenced by: [Yes] : Yes [Supervisor (if needed)] : Supervisor [Creative] : creative [Access to safe outdoor spaces] : access to safe outdoor spaces [FreeTextEntry2] : 02/01/2025 [FreeTextEntry3] : 07/21/2021 [Mental Health] : Mental Health [Initial] : Initial [every ___ months] : every [unfilled] months [___ times a week] : [unfilled] times a week [FreeTextEntry4] : "Discover who I am" [FreeTextEntry1] : self-confidence and individuation [de-identified] : increase patient's self-compassion, especially relating to his mental health and perceived "failures" [de-identified] : 02/01/2025 [de-identified] : increase patient's awareness of his own emotions and desires, independent of those of others [de-identified] : 02/01/2025 [FreeTextEntry5] : Relational psychodynamic psychotherapy [de-identified] : decrease symptoms of anxiety and depression, increased reliance on effective coping skills, increased independence

## 2024-09-04 NOTE — PLAN
[Motivational Interviewing] : Motivational Interviewing  [Psychodynamic Therapy] : Psychodynamic Therapy  [Recommended Frequency of Visits: ____] : Recommended frequency of visits: [unfilled] [Return in ____ week(s)] : Return in [unfilled] week(s) [FreeTextEntry2] : PROBLEM I: symptoms of anxiety and depression Goal: "Increase motivation and productivity, and work towards goals" Objective A: identify and process the fears that drive patient's avoidance Objective B: identify and strengthen patient's engagement in adaptive coping strategies, and decrease his reliance on maladaptive coping strategies Objective C: break down overwhelming goals into smaller, more manageable tasks  PROBLEM II: self-confidence and individuation Goal: "Discover who I am" Objective A: increase patient's self-compassion, especially relating to his mental health and perceived "failures" Objective B: increase patient's awareness of his own emotions and desires, independent of those of others Objective C: increase patient's ability to voice his own emotions and desires, especially if he disagrees with others  DIAGNOSES: Major Depressive Disorder, recurrent, moderate (F33.1) Generalized Anxiety Disorder (F41.1) Attention-Deficit/Hyperactivity Disorder, 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 Patient and clinician reviewed treatment goals and completed the DAST-10, AUDIT, SOS-10, and C-SSRS. During the C-SSRS, patient endorsed approximately once weekly suicidal ideation, with no method, plan, or intent. Patient stated that he feels able to keep himself safe at this time. Patient reported increased anxiety, racing thoughts, and catastrophizing during an interpersonal rupture with a friend. He described regulating his anxiety by running his nails back and forth along his inner forearm. Patient did draw blood, though did not realize until after his anxiety had lessened. Clinician observed a white line of skin with some scabs on his left inner forearm. He explained that in the past he has regulated his anxiety by scraping his legs with his nails through his pockets. This is the first time he has scraped his arms and the first time he has drawn blood. Patient and clinician processed guilt about unintentionally hurting his friend, frustration with himself about not picking up on their nonverbal cues, and frustration with them about not communicating their boundaries clearly. Clinician engaged in active listening and validation and encouraged processing of the dialectic. Problem I, Objective B and Problem II, Objective B were addressed. Patient ended the session in good emotional and behavioral control. No homicidal ideation, auditory/visual hallucinations, delusions, psychosis, or mian were evidenced or endorsed.  [FreeTextEntry1] : Patient will continue to engage with the following mental health services: - once weekly individual psychodynamic psychotherapy with MARIE Shepard, [facility] - treatment plan review in February 2025 - monthly or twice monthly psychiatric medication management with Aysha Meyer MD

## 2024-09-04 NOTE — PHYSICAL EXAM
[Well groomed] : well groomed [Cooperative] : cooperative [Anxious] : anxious [Full] : full [Clear] : clear [Linear/Goal Directed] : linear/goal directed [None] : none [None Reported] : none reported [Average] : average [WNL] : within normal limits [FreeTextEntry8] : Patient expressed some anxiety about starting a new job. [1 - Monthly or less] : 1 - Monthly or less [0 - 1 or 2] : 0 - 1 or 2 [0 - Never] : 0 - Never [1 - Less than monthly] : 1 - Less than monthly [0 - No] : 0 - No [5] : 5 [3] : 3 [2] : 2 [0] : 0 [4] : 4 [1] : 1 [] : No [FreeTextEntry1] : 3 [SchwartzScore] : 25

## 2024-09-04 NOTE — DISCUSSION/SUMMARY
[Plan Review] : Plan Review [Able to manage surrounding demands and opportunities] : able to manage surrounding demands and opportunities [Adherent to treatment recommendations] : adherent to treatment recommendations [Articulate] : articulate [Attempting to realize their potential] : Attempting to realize their potential [Cognitively intact] : cognitively intact [Insightful] : insightful [Intelligent] : intelligent [Motivated to participate in treatment] : motivated to participate in treatment [Motivated and ready for change] : motivated and ready for change [Health literacy] : health literacy [Motivated to maintain or improve physical health] : motivated to maintain or improve physical health [In good health] : in good health [Has vocational interests or hobbies] : has vocational interests or hobbies [Steady employment] : steady employment [Housing stability] : housing stability [High level of education] : high level of education [English fluency] : English fluency [Connected to healthcare] : connected to healthcare [Social supports] : social supports [Improvement in symptoms as evidenced by:] : Improvement in symptoms as evidenced by: [Yes] : Yes [Supervisor (if needed)] : Supervisor [Creative] : creative [Access to safe outdoor spaces] : access to safe outdoor spaces [FreeTextEntry2] : 02/01/2025 [FreeTextEntry3] : 07/21/2021 [Mental Health] : Mental Health [Initial] : Initial [every ___ months] : every [unfilled] months [___ times a week] : [unfilled] times a week [FreeTextEntry4] : "Discover who I am" [FreeTextEntry1] : self-confidence and individuation [de-identified] : increase patient's self-compassion, especially relating to his mental health and perceived "failures" [de-identified] : 02/01/2025 [de-identified] : increase patient's awareness of his own emotions and desires, independent of those of others [de-identified] : 02/01/2025 [FreeTextEntry5] : Relational psychodynamic psychotherapy [de-identified] : decrease symptoms of anxiety and depression, increased reliance on effective coping skills, increased independence

## 2024-09-04 NOTE — DISCUSSION/SUMMARY
[Plan Review] : Plan Review [Able to manage surrounding demands and opportunities] : able to manage surrounding demands and opportunities [Adherent to treatment recommendations] : adherent to treatment recommendations [Articulate] : articulate [Attempting to realize their potential] : Attempting to realize their potential [Cognitively intact] : cognitively intact [Insightful] : insightful [Intelligent] : intelligent [Motivated to participate in treatment] : motivated to participate in treatment [Motivated and ready for change] : motivated and ready for change [Health literacy] : health literacy [Motivated to maintain or improve physical health] : motivated to maintain or improve physical health [In good health] : in good health [Has vocational interests or hobbies] : has vocational interests or hobbies [Steady employment] : steady employment [Housing stability] : housing stability [High level of education] : high level of education [English fluency] : English fluency [Connected to healthcare] : connected to healthcare [Social supports] : social supports [Improvement in symptoms as evidenced by:] : Improvement in symptoms as evidenced by: [Yes] : Yes [Supervisor (if needed)] : Supervisor [Creative] : creative [Access to safe outdoor spaces] : access to safe outdoor spaces [FreeTextEntry2] : 02/01/2025 [FreeTextEntry3] : 07/21/2021 [Mental Health] : Mental Health [Initial] : Initial [every ___ months] : every [unfilled] months [___ times a week] : [unfilled] times a week [FreeTextEntry4] : "Discover who I am" [FreeTextEntry1] : self-confidence and individuation [de-identified] : increase patient's self-compassion, especially relating to his mental health and perceived "failures" [de-identified] : 02/01/2025 [de-identified] : increase patient's awareness of his own emotions and desires, independent of those of others [de-identified] : 02/01/2025 [FreeTextEntry5] : Relational psychodynamic psychotherapy [de-identified] : decrease symptoms of anxiety and depression, increased reliance on effective coping skills, increased independence

## 2024-09-04 NOTE — REASON FOR VISIT
[Patient preference] : as per patient preference [Access issues (e.g., transportation, impaired mobility, etc.)] : due to patient's access issues [Continuing, patient not seen in-person within last 12 months (provide details below)] : Telehealth services are continuing, patient not seen in-person within last 12 months.  [Telehealth (audio & video) - Individual/Group] : This visit was provided via telehealth using real-time 2-way audio visual technology. [Other Location: e.g. Home (Enter Location, City,State)___] : The provider was located at [unfilled]. [Home] : The patient, [unfilled], was located at home, [unfilled], at the time of the visit. [Verbal consent obtained from patient/other participant(s)] : Verbal consent for telehealth/telephonic services obtained from patient/other participant(s) [Patient] : Patient [FreeTextEntry4] : 9:00 AM [FreeTextEntry5] : 9:45 AM [FreeTextEntry3] : In-person sessions are not clinically indicated at this time. Patient prefers virtual sessions due to access issues. [FreeTextEntry1] : Patient is seeking treatment to address symptoms of anxiety and depression and build self-confidence and individuation.

## 2024-09-11 ENCOUNTER — APPOINTMENT (OUTPATIENT)
Dept: PSYCHIATRY | Facility: CLINIC | Age: 24
End: 2024-09-11
Payer: COMMERCIAL

## 2024-09-11 DIAGNOSIS — F50.9 EATING DISORDER, UNSPECIFIED: ICD-10-CM

## 2024-09-11 DIAGNOSIS — F33.1 MAJOR DEPRESSIVE DISORDER, RECURRENT, MODERATE: ICD-10-CM

## 2024-09-11 DIAGNOSIS — F41.1 GENERALIZED ANXIETY DISORDER: ICD-10-CM

## 2024-09-11 PROCEDURE — G2211 COMPLEX E/M VISIT ADD ON: CPT | Mod: NC,95

## 2024-09-11 PROCEDURE — 99214 OFFICE O/P EST MOD 30 MIN: CPT | Mod: 95

## 2024-09-11 PROCEDURE — 90833 PSYTX W PT W E/M 30 MIN: CPT | Mod: 95

## 2024-09-11 NOTE — DISCUSSION/SUMMARY
[FreeTextEntry1] : Patient is a 22yo  (black/), cis gender, heterosexual male domiciled with his father, rising sophomore at [employer], with no significant PMHx, and PPHx significant for ADHD, IZZY, MDD recurrent, currently prescribed Adderall 15mg PO TID by pediatrician, in psychotherapy with Linda Connell for many years. Patient does not have a hx of psych hospitalization, suicide attempts, or psych ER visits. Pt. is the only child of  parents. Stressors include interpersonal distress related to relationship with parents and GF. Patient endorsed inattentive sxs of ADHD, IZZY, and depression and presented for medication consultation in the context of worsening mood and increased anxiety in Jan 2023 - started on Fluoxetine with good tolerance and response. Dose decreased from 40mg to 20mg in October 2023 per pt request. Fluoxetine dose increased again from 20mg to 40mg in April 2024. Adderall XR increased from 10mg to 20mg on 5/29/24. Currently taking a break from Adderall as of summer 2024.  Time Spent: Reviewing chart, developing rapport, psychoeducation, diagnosis, medication mgmt, reviewed risks vs benefits of meds, indication, and potential adverse effects. Shared decision to hold adderall ER given lack of availability and leave of absence from school. Explored when/if SSRI should be down titrated.  NOTE:  Increased adderall ER 10mg to 15mg PO QDaily on 2/21/24 but pt experienced more s/e. Increased from 10mg to 20mg today (5/29/24) after pt shared doubling dose was more helpful than taking the 10mg ER with 5mg IR. Notes he has many responsibilities this summer and needs ADHD sxs to be under better control. Shared decision to continue dose of fluoxetine given good tolerance and response.  Psychotherapy: 16 minutes spent - psychoeducation, family work, supportive counseling.. Discussed father's concerns about medications, diagnosis and prognosis  Name of PCP: Dr. Kimberley Gates Date of Last Physical Exam: 2019 (reminded to scheduled annual exam.) Date of Last Annual Labs: Cannot recall Annual Review of Systems Completed (Y/N): Y Tobacco Screening Completed (Y/N): Y (non-smoker)

## 2024-09-11 NOTE — PLAN
[Medication education provided] : Medication education provided. [Rationale for medication choices, possible risks/precautions, benefits, alternative treatment choices, and consequences of non-treatment discussed] : Rationale for medication choices, possible risks/precautions, benefits, alternative treatment choices, and consequences of non-treatment discussed with patient/family/caregiver  [Order(s) for medication placed in [facility] EHR] : Medication [FreeTextEntry4] : Recently started meds to address sxs of anxiety, depression, binge eating. [FreeTextEntry5] : PLAN: #) Continue fluoxetine 40mg PO QDaily  #) NOTE: pt has been unable to get stimulant medication. He decided to pause stimulant for now. NOTE:  Increase adderall XR 10mg to 20mg PO Qam on 5/29/24 (dose decreased from 15mg to 10mg on 3/20/24) and monitor for tolerance and response. d/c 5mg IR tablet (can take full tablet or half tablet PRN) on 5/29/24 #) Continue I therapy #) F/U in 4 weeks

## 2024-09-11 NOTE — HISTORY OF PRESENT ILLNESS
[FreeTextEntry1] : Pt is seen and evaluated. Interval hx reviewed. 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 Currently working part-time but offered full-time status. Notes he has to give a one-hour presentation and feels stressed about this as he has performance anxiety. Has recently accepted that he has a gina addiction. Notes he had taken 2 months off from gina and recently started again and has difficulty pulling himself away from video games. He shares that he has been struggling to take his medication. He also notes he does not want to take the medication. Skips 1-2 days at a time. Denies SI. No other change from baseline sxs.   Note: has been dx with adhd inattentive type, IZZY, and MDD with neuropsych testing in 2018. Prescribed Adderall 15mg by pediatrician, Dr. Gloria [phone]. Decrease fluoxetine from 40mg to 20mg PO Qdaily on 10/6/23.

## 2024-09-11 NOTE — PAST MEDICAL HISTORY
[FreeTextEntry1] : Patient has been in psychotherapy treatment since July 2018 with Linda Connell at the psychological center at [facility].  Patient terminated treatment there in May 2021.  Patient has also completed a neuropsychological testing in March 2018.  Patient was diagnosed with IZZY, ADHD, inattentive type, moderate, and MDD, recurring, moderate. Patient denies past psychiatric hospitalization.  Patient was prescribed Adderall 15 mg from pediatrician in August 2020. \par

## 2024-09-11 NOTE — SOCIAL HISTORY
[With Family] : lives with family [Unemployed] : unemployed [Never ] : never  [High School] : high school [None] : none [Yes] : yes [FreeTextEntry1] : Living with Mother and Father in separate homes.  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

## 2024-09-16 NOTE — END OF VISIT
[Duration of Psychotherapy Visit (minutes spent in synchronous communication): ____] : Duration of Psychotherapy Visit (minutes spent in synchronous communication): [unfilled] [Individual Psychotherapy for 38-52 minutes] : Individual Psychotherapy for 38 - 52 minutes [Teletherapy Service Provided] : The services provided in this session were delivered via tele-therapy [FreeTextEntry3] : Home in [place], [place] [FreeTextEntry5] : Home in [place], [place]

## 2024-09-16 NOTE — REASON FOR VISIT
[Patient preference] : as per patient preference [Access issues (e.g., transportation, impaired mobility, etc.)] : due to patient's access issues [Continuing, patient not seen in-person within last 12 months (provide details below)] : Telehealth services are continuing, patient not seen in-person within last 12 months.  [Telehealth (audio & video) - Individual/Group] : This visit was provided via telehealth using real-time 2-way audio visual technology. [Other Location: e.g. Home (Enter Location, City,State)___] : The provider was located at [unfilled]. [Home] : The patient, [unfilled], was located at home, [unfilled], at the time of the visit. [Verbal consent obtained from patient/other participant(s)] : Verbal consent for telehealth/telephonic services obtained from patient/other participant(s) [FreeTextEntry4] : 10:00 AM [FreeTextEntry5] : 10:45 AM [FreeTextEntry3] : In-person sessions are not clinically indicated at this time. Patient prefers virtual sessions due to access issues. [Patient] : Patient [FreeTextEntry1] : Patient is seeking treatment to address symptoms of anxiety and depression and build self-confidence and individuation.

## 2024-09-16 NOTE — END OF VISIT
[Duration of Psychotherapy Visit (minutes spent in synchronous communication): ____] : Duration of Psychotherapy Visit (minutes spent in synchronous communication): [unfilled] [Individual Psychotherapy for 38-52 minutes] : Individual Psychotherapy for 38 - 52 minutes [Teletherapy Service Provided] : The services provided in this session were delivered via tele-therapy [FreeTextEntry3] : Home in [place], [place] [FreeTextEntry5] : Home in [place], [place]

## 2024-09-16 NOTE — PLAN
[FreeTextEntry2] : PROBLEM I: symptoms of anxiety and depression Goal: "Increase motivation and productivity, and work towards goals" Objective A: identify and process the fears that drive patient's avoidance Objective B: identify and strengthen patient's engagement in adaptive coping strategies, and decrease his reliance on maladaptive coping strategies Objective C: break down overwhelming goals into smaller, more manageable tasks  PROBLEM II: self-confidence and individuation Goal: "Discover who I am" Objective A: increase patient's self-compassion, especially relating to his mental health and perceived "failures" Objective B: increase patient's awareness of his own emotions and desires, independent of those of others Objective C: increase patient's ability to voice his own emotions and desires, especially if he disagrees with others  DIAGNOSES: Major Depressive Disorder, recurrent, moderate (F33.1) Generalized Anxiety Disorder (F41.1) Attention-Deficit/Hyperactivity Disorder, 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 Patient reported increased anxiety and panic-like symptoms during a brief, unexpected text exchange with an ex-partner. Patient shared the history of this relationship and expressed overwhelm about being unexpectedly re-immersed in a confusing interpersonal dynamic that impacts his self-esteem and self-confidence. Patient and clinician also processed a recent dream, exploring themes of dependency versus independence, self-advocacy, and personal growth. Clinician engaged in active listening and validation, highlighted use of coping skills, and encouraged curiosity and free association regarding dream content. Problem I, Objective B and Problem II, Objective B were addressed. Patient ended the session in good emotional and behavioral control. No homicidal ideation, auditory/visual hallucinations, delusions, psychosis, or mian were evidenced or endorsed.  [Recommended Frequency of Visits: ____] : Recommended frequency of visits: [unfilled] [Return in ____ week(s)] : Return in [unfilled] week(s) [FreeTextEntry1] : Patient will continue to engage with the following mental health services: - once weekly individual psychodynamic psychotherapy with MARIE Shepard, [facility] - treatment plan review in February 2025 - monthly or twice monthly psychiatric medication management with Aysha Meyer MD

## 2024-09-16 NOTE — DISCUSSION/SUMMARY
[FreeTextEntry1] : Patient is a 23-year-old  (Black, ), cisgender, heterosexual male who presented to individual psychotherapy to address symptoms of anxiety and depression and build self-confidence and individuation. He is the only child of  parents, currently resides with his father, and attends [employer]. Patient is taking the upcoming fall semester off. Per chart review, he has a PPH of MDD, IZZY, and ADHD and is currently prescribed fluoxetine 40mg QD. He and his psychiatrist recently decided to pause amphetamine-dextroamphetamine ER 20mg QD due to the national shortage and the break from college. Patient has a history of passive SI, with no history of psychiatric ER visit/hospitalizations or suicide attempts. Patient has no significant PMH. Treatment objectives include nurturing self-compassion, examining the function of avoidance, strengthening adaptive coping strategies, and increasing awareness and expression of his own emotions and desires. Treatment objectives will continue to be addressed using once weekly individual psychodynamic psychotherapy with MARIE Shepard, [facility] and monthly or twice monthly psychiatric medication management with Aysha Meyer MD.  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

## 2024-09-16 NOTE — DISCUSSION/SUMMARY
[FreeTextEntry1] : Patient is a 23-year-old  (Black, ), cisgender, heterosexual male who presented to individual psychotherapy to address symptoms of anxiety and depression and build self-confidence and individuation. He is the only child of  parents, currently resides with his father, and attends [employer]. Patient is taking the upcoming fall semester off. Per chart review, he has a PPH of MDD, IZZY, and ADHD and is currently prescribed fluoxetine 40mg QD. He and his psychiatrist recently decided to pause amphetamine-dextroamphetamine ER 20mg QD due to the national shortage and the break from college. Patient has a history of passive SI, with no history of psychiatric ER visit/hospitalizations or suicide attempts. Patient has no significant PMH. Treatment objectives include nurturing self-compassion, examining the function of avoidance, strengthening adaptive coping strategies, and increasing awareness and expression of his own emotions and desires. Treatment objectives will continue to be addressed using once weekly individual psychodynamic psychotherapy with MARIE Shepard, [facility] and monthly or twice monthly psychiatric medication management with Aysah Meyer MD.  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

## 2024-09-16 NOTE — ADDENDUM
[FreeTextEntry1] : Clinician will review this psychotherapy session with community supervisor, Mychal Jimenez, PhD, in individual supervision.  Supervision Method: case presentation Supervision Focus: assessment, intervention
[FreeTextEntry1] : Clinician will review this psychotherapy session with community supervisor, Mychal Jimenez, PhD, in individual supervision.  Supervision Method: case presentation Supervision Focus: assessment, intervention
Pt admitted from [place], states that he used to be able to amb 50ft w/ RW to a chair, but recently has been using a WC.

## 2024-09-16 NOTE — PLAN
[FreeTextEntry2] : PROBLEM I: symptoms of anxiety and depression Goal: "Increase motivation and productivity, and work towards goals" Objective A: identify and process the fears that drive patient's avoidance Objective B: identify and strengthen patient's engagement in adaptive coping strategies, and decrease his reliance on maladaptive coping strategies Objective C: break down overwhelming goals into smaller, more manageable tasks  PROBLEM II: self-confidence and individuation Goal: "Discover who I am" Objective A: increase patient's self-compassion, especially relating to his mental health and perceived "failures" Objective B: increase patient's awareness of his own emotions and desires, independent of those of others Objective C: increase patient's ability to voice his own emotions and desires, especially if he disagrees with others  DIAGNOSES: Major Depressive Disorder, recurrent, moderate (F33.1) Generalized Anxiety Disorder (F41.1) Attention-Deficit/Hyperactivity Disorder, 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 Patient reported increased anxiety and panic-like symptoms during a brief, unexpected text exchange with an ex-partner. Patient shared the history of this relationship and expressed overwhelm about being unexpectedly re-immersed in a confusing interpersonal dynamic that impacts his self-esteem and self-confidence. Patient and clinician also processed a recent dream, exploring themes of dependency versus independence, self-advocacy, and personal growth. Clinician engaged in active listening and validation, highlighted use of coping skills, and encouraged curiosity and free association regarding dream content. Problem I, Objective B and Problem II, Objective B were addressed. Patient ended the session in good emotional and behavioral control. No homicidal ideation, auditory/visual hallucinations, delusions, psychosis, or mian were evidenced or endorsed.  [Recommended Frequency of Visits: ____] : Recommended frequency of visits: [unfilled] [Return in ____ week(s)] : Return in [unfilled] week(s) [FreeTextEntry1] : Patient will continue to engage with the following mental health services: - once weekly individual psychodynamic psychotherapy with MARIE Shepard, [facility] - treatment plan review in February 2025 - monthly or twice monthly psychiatric medication management with Aysha Meyer MD

## 2024-09-18 ENCOUNTER — APPOINTMENT (OUTPATIENT)
Dept: PSYCHIATRY | Facility: CLINIC | Age: 24
End: 2024-09-18

## 2024-09-25 ENCOUNTER — APPOINTMENT (OUTPATIENT)
Dept: PSYCHIATRY | Facility: CLINIC | Age: 24
End: 2024-09-25

## 2024-10-02 ENCOUNTER — APPOINTMENT (OUTPATIENT)
Dept: PSYCHIATRY | Facility: CLINIC | Age: 24
End: 2024-10-02

## 2024-10-03 NOTE — DISCUSSION/SUMMARY
[FreeTextEntry1] : Patient is a 23-year-old  (Black, ), cisgender, heterosexual male who presented to individual psychotherapy to address symptoms of anxiety and depression and build self-confidence and individuation. He is the only child of  parents, currently resides with his father, and attends [employer]. Patient is taking the upcoming fall semester off. Per chart review, he has a PPH of MDD, IZZY, and ADHD and is currently prescribed fluoxetine 40mg QD. He and his psychiatrist recently decided to pause amphetamine-dextroamphetamine ER 20mg QD due to the national shortage and the break from college. Patient has a history of passive SI, with no history of psychiatric ER visit/hospitalizations or suicide attempts. Patient has no significant PMH. Treatment objectives include nurturing self-compassion, examining the function of avoidance, strengthening adaptive coping strategies, and increasing awareness and expression of his own emotions and desires. Treatment objectives will continue to be addressed using once weekly individual psychodynamic psychotherapy with MARIE Shepard, [facility] and monthly or twice monthly psychiatric medication management with Aysha Meyer MD.  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

## 2024-10-03 NOTE — PLAN
[FreeTextEntry2] : PROBLEM I: symptoms of anxiety and depression Goal: "Increase motivation and productivity, and work towards goals" Objective A: identify and process the fears that drive patient's avoidance Objective B: identify and strengthen patient's engagement in adaptive coping strategies, and decrease his reliance on maladaptive coping strategies Objective C: break down overwhelming goals into smaller, more manageable tasks  PROBLEM II: self-confidence and individuation Goal: "Discover who I am" Objective A: increase patient's self-compassion, especially relating to his mental health and perceived "failures" Objective B: increase patient's awareness of his own emotions and desires, independent of those of others Objective C: increase patient's ability to voice his own emotions and desires, especially if he disagrees with others  DIAGNOSES: Major Depressive Disorder, recurrent, moderate (F33.1) Generalized Anxiety Disorder (F41.1) Attention-Deficit/Hyperactivity Disorder, 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 Patient shared a "discouraging" work experience. Patient and clinician connected it to his race/culture, feeling outside of the in-group, and all-or-nothing thinking. Patient and clinician also examined his tendency to make decisions based on fear of what could go wrong, rather than hope for what could go well, rooted in decision-making modeled to him during childhood. Clinician engaged in active listening and validation and encouraged deeper exploration of emotions. Problem II, Objectives A and B were addressed. Patient ended the session in good emotional and behavioral control. No homicidal ideation, auditory/visual hallucinations, delusions, psychosis, or mian were evidenced or endorsed.  [Recommended Frequency of Visits: ____] : Recommended frequency of visits: [unfilled] [Return in ____ week(s)] : Return in [unfilled] week(s) [FreeTextEntry1] : Patient will continue to engage with the following mental health services: - once weekly individual psychodynamic psychotherapy with MARIE Shepard, [facility] - treatment plan review in February 2025 - monthly or twice monthly psychiatric medication management with Aysha Meyer MD

## 2024-10-03 NOTE — END OF VISIT
[Duration of Psychotherapy Visit (minutes spent in synchronous communication): ____] : Duration of Psychotherapy Visit (minutes spent in synchronous communication): [unfilled] [Individual Psychotherapy for 38-52 minutes] : Individual Psychotherapy for 38 - 52 minutes [Teletherapy Service Provided] : The services provided in this session were delivered via tele-therapy [FreeTextEntry3] : Home in [place], [place] [FreeTextEntry5] : Home in [place], [place]

## 2024-10-08 NOTE — DISCUSSION/SUMMARY
[FreeTextEntry1] : Patient is a 23-year-old  (Black, ), cisgender, heterosexual male who presented to individual psychotherapy to address symptoms of anxiety and depression and build self-confidence and individuation. He is the only child of  parents, currently resides with his father, and attends [email or web address]. Patient is taking the upcoming fall semester off. Per chart review, he has a PPH of MDD, IZZY, and ADHD and is currently prescribed fluoxetine 40mg QD. He and his psychiatrist recently decided to pause amphetamine-dextroamphetamine ER 20mg QD due to the national shortage and the break from college. Patient has a history of passive SI, with no history of psychiatric ER visit/hospitalizations or suicide attempts. Patient has no significant PMH. Treatment objectives include nurturing self-compassion, examining the function of avoidance, strengthening adaptive coping strategies, and increasing awareness and expression of his own emotions and desires. Treatment objectives will continue to be addressed using once weekly individual psychodynamic psychotherapy with MARIE Shepard, [facility] and monthly or twice monthly psychiatric medication management with Aysha Meyer MD.  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

## 2024-10-08 NOTE — PLAN
[Psychodynamic Therapy] : Psychodynamic Therapy  [Recommended Frequency of Visits: ____] : Recommended frequency of visits: [unfilled] [Return in ____ week(s)] : Return in [unfilled] week(s) [FreeTextEntry2] : PROBLEM I: symptoms of anxiety and depression Goal: "Increase motivation and productivity, and work towards goals" Objective A: identify and process the fears that drive patient's avoidance Objective B: identify and strengthen patient's engagement in adaptive coping strategies, and decrease his reliance on maladaptive coping strategies Objective C: break down overwhelming goals into smaller, more manageable tasks  PROBLEM II: self-confidence and individuation Goal: "Discover who I am" Objective A: increase patient's self-compassion, especially relating to his mental health and perceived "failures" Objective B: increase patient's awareness of his own emotions and desires, independent of those of others Objective C: increase patient's ability to voice his own emotions and desires, especially if he disagrees with others  DIAGNOSES: Major Depressive Disorder, recurrent, moderate (F33.1) Generalized Anxiety Disorder (F41.1) Attention-Deficit/Hyperactivity Disorder, 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 Patient and clinician examined the impact of his parents' anxiety on his emotional regulation. Patient reflected on times he has felt true belonging and acceptance. In addition, patient and clinician explored experiences of invalidation when communicating his feelings and internalized messages about being "too sensitive." Clinician engaged in active listening and validation and encouraged deeper exploration of emotions. Problem I, Objective B and Problem II, Objectives B and C were addressed. Patient ended the session in good emotional and behavioral control. No homicidal ideation, auditory/visual hallucinations, delusions, psychosis, or mian were evidenced or endorsed.  [FreeTextEntry1] : Patient will continue to engage with the following mental health services: - once weekly individual psychodynamic psychotherapy with MARIE Shepard, [facility] - treatment plan review in February 2025 - monthly or twice monthly psychiatric medication management with Aysha Meyer MD

## 2024-10-08 NOTE — DISCUSSION/SUMMARY
[FreeTextEntry1] : Patient is a 23-year-old  (Black, ), cisgender, heterosexual male who presented to individual psychotherapy to address symptoms of anxiety and depression and build self-confidence and individuation. He is the only child of  parents, currently resides with his father, and attends [employer]. Patient is taking the upcoming fall semester off. Per chart review, he has a PPH of MDD, IZZY, and ADHD and is currently prescribed fluoxetine 40mg QD. He and his psychiatrist recently decided to pause amphetamine-dextroamphetamine ER 20mg QD due to the national shortage and the break from college. Patient has a history of passive SI, with no history of psychiatric ER visit/hospitalizations or suicide attempts. Patient has no significant PMH. Treatment objectives include nurturing self-compassion, examining the function of avoidance, strengthening adaptive coping strategies, and increasing awareness and expression of his own emotions and desires. Treatment objectives will continue to be addressed using once weekly individual psychodynamic psychotherapy with MARIE Shepard, [facility] and monthly or twice monthly psychiatric medication management with Aysha Meyer MD.  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

## 2024-10-08 NOTE — END OF VISIT
[Duration of Psychotherapy Visit (minutes spent in synchronous communication): ____] : Duration of Psychotherapy Visit (minutes spent in synchronous communication): [unfilled] [Individual Psychotherapy for 38-52 minutes] : Individual Psychotherapy for 38 - 52 minutes [Teletherapy Service Provided] : The services provided in this session were delivered via tele-therapy [FreeTextEntry3] : Home in [place], [place] [FreeTextEntry5] : Home in [place], [place]

## 2024-10-08 NOTE — END OF VISIT
[Duration of Psychotherapy Visit (minutes spent in synchronous communication): ____] : Duration of Psychotherapy Visit (minutes spent in synchronous communication): [unfilled] [Individual Psychotherapy for 38-52 minutes] : Individual Psychotherapy for 38 - 52 minutes [Teletherapy Service Provided] : The services provided in this session were delivered via tele-therapy [FreeTextEntry3] : Home in [place], [place] [FreeTextEntry5] : Home in [place], [place]

## 2024-10-08 NOTE — PLAN
[Psychodynamic Therapy] : Psychodynamic Therapy  [Recommended Frequency of Visits: ____] : Recommended frequency of visits: [unfilled] [Return in ____ week(s)] : Return in [unfilled] week(s) [FreeTextEntry2] : PROBLEM I: symptoms of anxiety and depression Goal: "Increase motivation and productivity, and work towards goals" Objective A: identify and process the fears that drive patient's avoidance Objective B: identify and strengthen patient's engagement in adaptive coping strategies, and decrease his reliance on maladaptive coping strategies Objective C: break down overwhelming goals into smaller, more manageable tasks  PROBLEM II: self-confidence and individuation Goal: "Discover who I am" Objective A: increase patient's self-compassion, especially relating to his mental health and perceived "failures" Objective B: increase patient's awareness of his own emotions and desires, independent of those of others Objective C: increase patient's ability to voice his own emotions and desires, especially if he disagrees with others  DIAGNOSES: Major Depressive Disorder, recurrent, moderate (F33.1) Generalized Anxiety Disorder (F41.1) Attention-Deficit/Hyperactivity Disorder, 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 Patient and clinician examined the impact of his parents' anxiety on his emotional regulation. Patient reflected on times he has felt true belonging and acceptance. In addition, patient and clinician explored experiences of invalidation when communicating his feelings and internalized messages about being "too sensitive." Clinician engaged in active listening and validation and encouraged deeper exploration of emotions. Problem I, Objective B and Problem II, Objectives B and C were addressed. Patient ended the session in good emotional and behavioral control. No homicidal ideation, auditory/visual hallucinations, delusions, psychosis, or mian were evidenced or endorsed.  [FreeTextEntry1] : Patient will continue to engage with the following mental health services: - once weekly individual psychodynamic psychotherapy with MARIE Shepard, [facility] - treatment plan review in February 2025 - monthly or twice monthly psychiatric medication management with Aysha Meyer MD

## 2024-10-08 NOTE — REASON FOR VISIT
[Patient preference] : as per patient preference [Access issues (e.g., transportation, impaired mobility, etc.)] : due to patient's access issues [Continuing, patient not seen in-person within last 12 months (provide details below)] : Telehealth services are continuing, patient not seen in-person within last 12 months.  [Telehealth (audio & video) - Individual/Group] : This visit was provided via telehealth using real-time 2-way audio visual technology. [Other Location: e.g. Home (Enter Location, City,State)___] : The provider was located at [unfilled]. [Home] : The patient, [unfilled], was located at home, [unfilled], at the time of the visit. [Verbal consent obtained from patient/other participant(s)] : Verbal consent for telehealth/telephonic services obtained from patient/other participant(s) [Patient] : Patient [FreeTextEntry4] : 10:00 AM [FreeTextEntry5] : 10:45 AM [FreeTextEntry3] : In-person sessions are not clinically indicated at this time. Patient prefers virtual sessions due to access issues. [FreeTextEntry1] : Patient is seeking treatment to address symptoms of anxiety and depression and build self-confidence and individuation.

## 2024-10-09 ENCOUNTER — APPOINTMENT (OUTPATIENT)
Dept: PSYCHIATRY | Facility: CLINIC | Age: 24
End: 2024-10-09

## 2024-10-10 ENCOUNTER — APPOINTMENT (OUTPATIENT)
Dept: PSYCHIATRY | Facility: CLINIC | Age: 24
End: 2024-10-10
Payer: COMMERCIAL

## 2024-10-10 DIAGNOSIS — F33.1 MAJOR DEPRESSIVE DISORDER, RECURRENT, MODERATE: ICD-10-CM

## 2024-10-10 DIAGNOSIS — F90.0 ATTENTION-DEFICIT HYPERACTIVITY DISORDER, PREDOMINANTLY INATTENTIVE TYPE: ICD-10-CM

## 2024-10-10 DIAGNOSIS — F41.1 GENERALIZED ANXIETY DISORDER: ICD-10-CM

## 2024-10-10 DIAGNOSIS — F50.9 EATING DISORDER, UNSPECIFIED: ICD-10-CM

## 2024-10-10 PROCEDURE — 99214 OFFICE O/P EST MOD 30 MIN: CPT | Mod: 95

## 2024-10-10 PROCEDURE — G2211 COMPLEX E/M VISIT ADD ON: CPT | Mod: 95

## 2024-10-10 PROCEDURE — 90833 PSYTX W PT W E/M 30 MIN: CPT | Mod: 95

## 2024-10-14 ENCOUNTER — APPOINTMENT (OUTPATIENT)
Dept: PSYCHIATRY | Facility: CLINIC | Age: 24
End: 2024-10-14

## 2024-10-16 ENCOUNTER — APPOINTMENT (OUTPATIENT)
Dept: PSYCHIATRY | Facility: CLINIC | Age: 24
End: 2024-10-16

## 2024-10-21 ENCOUNTER — APPOINTMENT (OUTPATIENT)
Dept: PSYCHIATRY | Facility: CLINIC | Age: 24
End: 2024-10-21

## 2024-10-23 ENCOUNTER — APPOINTMENT (OUTPATIENT)
Dept: PSYCHIATRY | Facility: CLINIC | Age: 24
End: 2024-10-23

## 2024-10-24 ENCOUNTER — APPOINTMENT (OUTPATIENT)
Dept: PSYCHIATRY | Facility: CLINIC | Age: 24
End: 2024-10-24
Payer: COMMERCIAL

## 2024-10-24 DIAGNOSIS — F41.1 GENERALIZED ANXIETY DISORDER: ICD-10-CM

## 2024-10-24 DIAGNOSIS — F33.1 MAJOR DEPRESSIVE DISORDER, RECURRENT, MODERATE: ICD-10-CM

## 2024-10-24 DIAGNOSIS — F90.0 ATTENTION-DEFICIT HYPERACTIVITY DISORDER, PREDOMINANTLY INATTENTIVE TYPE: ICD-10-CM

## 2024-10-24 DIAGNOSIS — F50.9 EATING DISORDER, UNSPECIFIED: ICD-10-CM

## 2024-10-24 PROCEDURE — 90833 PSYTX W PT W E/M 30 MIN: CPT | Mod: 95

## 2024-10-24 PROCEDURE — G2211 COMPLEX E/M VISIT ADD ON: CPT | Mod: 95

## 2024-10-24 PROCEDURE — 99214 OFFICE O/P EST MOD 30 MIN: CPT | Mod: 95

## 2024-10-24 RX ORDER — FLUOXETINE HYDROCHLORIDE 60 MG/1
60 TABLET ORAL DAILY
Qty: 30 | Refills: 0 | Status: ACTIVE | COMMUNITY
Start: 2024-10-24 | End: 1900-01-01

## 2024-10-28 ENCOUNTER — APPOINTMENT (OUTPATIENT)
Dept: PSYCHIATRY | Facility: CLINIC | Age: 24
End: 2024-10-28

## 2024-11-04 ENCOUNTER — APPOINTMENT (OUTPATIENT)
Dept: PSYCHIATRY | Facility: CLINIC | Age: 24
End: 2024-11-04

## 2024-11-06 ENCOUNTER — APPOINTMENT (OUTPATIENT)
Dept: PSYCHIATRY | Facility: CLINIC | Age: 24
End: 2024-11-06

## 2024-11-07 ENCOUNTER — APPOINTMENT (OUTPATIENT)
Dept: PSYCHIATRY | Facility: CLINIC | Age: 24
End: 2024-11-07

## 2024-11-11 ENCOUNTER — APPOINTMENT (OUTPATIENT)
Dept: PSYCHIATRY | Facility: CLINIC | Age: 24
End: 2024-11-11

## 2024-11-13 ENCOUNTER — APPOINTMENT (OUTPATIENT)
Dept: PSYCHIATRY | Facility: CLINIC | Age: 24
End: 2024-11-13

## 2024-11-13 ENCOUNTER — NON-APPOINTMENT (OUTPATIENT)
Age: 24
End: 2024-11-13

## 2024-11-14 ENCOUNTER — APPOINTMENT (OUTPATIENT)
Dept: PSYCHIATRY | Facility: CLINIC | Age: 24
End: 2024-11-14
Payer: COMMERCIAL

## 2024-11-14 DIAGNOSIS — F33.1 MAJOR DEPRESSIVE DISORDER, RECURRENT, MODERATE: ICD-10-CM

## 2024-11-14 DIAGNOSIS — F41.1 GENERALIZED ANXIETY DISORDER: ICD-10-CM

## 2024-11-14 PROCEDURE — G2211 COMPLEX E/M VISIT ADD ON: CPT | Mod: 95

## 2024-11-14 PROCEDURE — 90833 PSYTX W PT W E/M 30 MIN: CPT | Mod: 95

## 2024-11-14 PROCEDURE — 99214 OFFICE O/P EST MOD 30 MIN: CPT | Mod: 95

## 2024-11-18 ENCOUNTER — APPOINTMENT (OUTPATIENT)
Dept: PSYCHIATRY | Facility: CLINIC | Age: 24
End: 2024-11-18

## 2024-11-20 ENCOUNTER — APPOINTMENT (OUTPATIENT)
Dept: PSYCHIATRY | Facility: CLINIC | Age: 24
End: 2024-11-20

## 2024-11-25 ENCOUNTER — APPOINTMENT (OUTPATIENT)
Dept: PSYCHIATRY | Facility: CLINIC | Age: 24
End: 2024-11-25

## 2024-12-02 ENCOUNTER — APPOINTMENT (OUTPATIENT)
Dept: PSYCHIATRY | Facility: CLINIC | Age: 24
End: 2024-12-02

## 2024-12-04 ENCOUNTER — APPOINTMENT (OUTPATIENT)
Dept: PSYCHIATRY | Facility: CLINIC | Age: 24
End: 2024-12-04

## 2024-12-09 ENCOUNTER — APPOINTMENT (OUTPATIENT)
Dept: PSYCHIATRY | Facility: CLINIC | Age: 24
End: 2024-12-09

## 2024-12-11 ENCOUNTER — APPOINTMENT (OUTPATIENT)
Dept: PSYCHIATRY | Facility: CLINIC | Age: 24
End: 2024-12-11
Payer: COMMERCIAL

## 2024-12-11 DIAGNOSIS — F90.0 ATTENTION-DEFICIT HYPERACTIVITY DISORDER, PREDOMINANTLY INATTENTIVE TYPE: ICD-10-CM

## 2024-12-11 DIAGNOSIS — F50.9 EATING DISORDER, UNSPECIFIED: ICD-10-CM

## 2024-12-11 DIAGNOSIS — F33.1 MAJOR DEPRESSIVE DISORDER, RECURRENT, MODERATE: ICD-10-CM

## 2024-12-11 DIAGNOSIS — F41.1 GENERALIZED ANXIETY DISORDER: ICD-10-CM

## 2024-12-11 PROCEDURE — 99214 OFFICE O/P EST MOD 30 MIN: CPT | Mod: 95

## 2024-12-11 PROCEDURE — 90833 PSYTX W PT W E/M 30 MIN: CPT | Mod: 95

## 2024-12-11 PROCEDURE — G2211 COMPLEX E/M VISIT ADD ON: CPT | Mod: 95

## 2024-12-16 ENCOUNTER — APPOINTMENT (OUTPATIENT)
Dept: PSYCHIATRY | Facility: CLINIC | Age: 24
End: 2024-12-16

## 2024-12-17 ENCOUNTER — APPOINTMENT (OUTPATIENT)
Dept: PSYCHIATRY | Facility: CLINIC | Age: 24
End: 2024-12-17

## 2024-12-18 ENCOUNTER — APPOINTMENT (OUTPATIENT)
Dept: PSYCHIATRY | Facility: CLINIC | Age: 24
End: 2024-12-18

## 2024-12-23 ENCOUNTER — APPOINTMENT (OUTPATIENT)
Dept: PSYCHIATRY | Facility: CLINIC | Age: 24
End: 2024-12-23

## 2024-12-30 ENCOUNTER — APPOINTMENT (OUTPATIENT)
Dept: PSYCHIATRY | Facility: CLINIC | Age: 24
End: 2024-12-30

## 2025-01-06 ENCOUNTER — APPOINTMENT (OUTPATIENT)
Dept: PSYCHIATRY | Facility: CLINIC | Age: 25
End: 2025-01-06

## 2025-01-07 ENCOUNTER — APPOINTMENT (OUTPATIENT)
Dept: PSYCHIATRY | Facility: CLINIC | Age: 25
End: 2025-01-07
Payer: COMMERCIAL

## 2025-01-07 DIAGNOSIS — F90.0 ATTENTION-DEFICIT HYPERACTIVITY DISORDER, PREDOMINANTLY INATTENTIVE TYPE: ICD-10-CM

## 2025-01-07 DIAGNOSIS — F33.1 MAJOR DEPRESSIVE DISORDER, RECURRENT, MODERATE: ICD-10-CM

## 2025-01-07 DIAGNOSIS — F50.9 EATING DISORDER, UNSPECIFIED: ICD-10-CM

## 2025-01-07 DIAGNOSIS — F41.1 GENERALIZED ANXIETY DISORDER: ICD-10-CM

## 2025-01-07 PROCEDURE — 99214 OFFICE O/P EST MOD 30 MIN: CPT | Mod: 95

## 2025-01-07 PROCEDURE — G2211 COMPLEX E/M VISIT ADD ON: CPT | Mod: 95

## 2025-01-07 PROCEDURE — 90833 PSYTX W PT W E/M 30 MIN: CPT | Mod: 95

## 2025-01-08 ENCOUNTER — APPOINTMENT (OUTPATIENT)
Dept: PSYCHIATRY | Facility: CLINIC | Age: 25
End: 2025-01-08

## 2025-01-13 ENCOUNTER — APPOINTMENT (OUTPATIENT)
Dept: PSYCHIATRY | Facility: CLINIC | Age: 25
End: 2025-01-13

## 2025-01-15 ENCOUNTER — APPOINTMENT (OUTPATIENT)
Dept: PSYCHIATRY | Facility: CLINIC | Age: 25
End: 2025-01-15

## 2025-01-16 ENCOUNTER — APPOINTMENT (OUTPATIENT)
Dept: PSYCHIATRY | Facility: CLINIC | Age: 25
End: 2025-01-16

## 2025-01-22 ENCOUNTER — APPOINTMENT (OUTPATIENT)
Dept: PSYCHIATRY | Facility: CLINIC | Age: 25
End: 2025-01-22

## 2025-01-27 ENCOUNTER — APPOINTMENT (OUTPATIENT)
Dept: PSYCHIATRY | Facility: CLINIC | Age: 25
End: 2025-01-27

## 2025-01-29 ENCOUNTER — APPOINTMENT (OUTPATIENT)
Dept: PSYCHIATRY | Facility: CLINIC | Age: 25
End: 2025-01-29

## 2025-02-01 ENCOUNTER — OUTPATIENT (OUTPATIENT)
Dept: OUTPATIENT SERVICES | Facility: HOSPITAL | Age: 25
LOS: 1 days | Discharge: HOME | End: 2025-02-01

## 2025-02-03 ENCOUNTER — APPOINTMENT (OUTPATIENT)
Dept: PSYCHIATRY | Facility: CLINIC | Age: 25
End: 2025-02-03

## 2025-02-04 ENCOUNTER — APPOINTMENT (OUTPATIENT)
Dept: PSYCHIATRY | Facility: CLINIC | Age: 25
End: 2025-02-04
Payer: COMMERCIAL

## 2025-02-04 DIAGNOSIS — F33.1 MAJOR DEPRESSIVE DISORDER, RECURRENT, MODERATE: ICD-10-CM

## 2025-02-04 DIAGNOSIS — F50.9 EATING DISORDER, UNSPECIFIED: ICD-10-CM

## 2025-02-04 DIAGNOSIS — F41.1 GENERALIZED ANXIETY DISORDER: ICD-10-CM

## 2025-02-04 DIAGNOSIS — F90.0 ATTENTION-DEFICIT HYPERACTIVITY DISORDER, PREDOMINANTLY INATTENTIVE TYPE: ICD-10-CM

## 2025-02-04 PROCEDURE — G2211 COMPLEX E/M VISIT ADD ON: CPT | Mod: 95

## 2025-02-04 PROCEDURE — 99214 OFFICE O/P EST MOD 30 MIN: CPT | Mod: 95

## 2025-02-04 PROCEDURE — 90833 PSYTX W PT W E/M 30 MIN: CPT | Mod: 95

## 2025-02-05 ENCOUNTER — APPOINTMENT (OUTPATIENT)
Dept: PSYCHIATRY | Facility: CLINIC | Age: 25
End: 2025-02-05

## 2025-02-10 ENCOUNTER — APPOINTMENT (OUTPATIENT)
Dept: PSYCHIATRY | Facility: CLINIC | Age: 25
End: 2025-02-10

## 2025-02-12 ENCOUNTER — APPOINTMENT (OUTPATIENT)
Dept: PSYCHIATRY | Facility: CLINIC | Age: 25
End: 2025-02-12

## 2025-02-19 ENCOUNTER — APPOINTMENT (OUTPATIENT)
Dept: PSYCHIATRY | Facility: CLINIC | Age: 25
End: 2025-02-19

## 2025-02-20 DIAGNOSIS — F41.1 GENERALIZED ANXIETY DISORDER: ICD-10-CM

## 2025-02-20 DIAGNOSIS — F60.0 PARANOID PERSONALITY DISORDER: ICD-10-CM

## 2025-02-20 DIAGNOSIS — F33.1 MAJOR DEPRESSIVE DISORDER, RECURRENT, MODERATE: ICD-10-CM

## 2025-02-20 DIAGNOSIS — F90.0 ATTENTION-DEFICIT HYPERACTIVITY DISORDER, PREDOMINANTLY INATTENTIVE TYPE: ICD-10-CM

## 2025-02-24 ENCOUNTER — APPOINTMENT (OUTPATIENT)
Dept: PSYCHIATRY | Facility: CLINIC | Age: 25
End: 2025-02-24

## 2025-02-26 ENCOUNTER — APPOINTMENT (OUTPATIENT)
Dept: PSYCHIATRY | Facility: CLINIC | Age: 25
End: 2025-02-26

## 2025-03-03 ENCOUNTER — APPOINTMENT (OUTPATIENT)
Dept: PSYCHIATRY | Facility: CLINIC | Age: 25
End: 2025-03-03

## 2025-03-04 ENCOUNTER — APPOINTMENT (OUTPATIENT)
Dept: PSYCHIATRY | Facility: CLINIC | Age: 25
End: 2025-03-04
Payer: COMMERCIAL

## 2025-03-04 DIAGNOSIS — F90.0 ATTENTION-DEFICIT HYPERACTIVITY DISORDER, PREDOMINANTLY INATTENTIVE TYPE: ICD-10-CM

## 2025-03-04 DIAGNOSIS — F41.1 GENERALIZED ANXIETY DISORDER: ICD-10-CM

## 2025-03-04 DIAGNOSIS — F33.1 MAJOR DEPRESSIVE DISORDER, RECURRENT, MODERATE: ICD-10-CM

## 2025-03-04 DIAGNOSIS — F50.9 EATING DISORDER, UNSPECIFIED: ICD-10-CM

## 2025-03-04 PROCEDURE — G2211 COMPLEX E/M VISIT ADD ON: CPT | Mod: NC,95

## 2025-03-04 PROCEDURE — 90833 PSYTX W PT W E/M 30 MIN: CPT | Mod: 95

## 2025-03-04 PROCEDURE — 99214 OFFICE O/P EST MOD 30 MIN: CPT | Mod: 95

## 2025-03-05 ENCOUNTER — APPOINTMENT (OUTPATIENT)
Dept: PSYCHIATRY | Facility: CLINIC | Age: 25
End: 2025-03-05

## 2025-03-10 ENCOUNTER — APPOINTMENT (OUTPATIENT)
Dept: PSYCHIATRY | Facility: CLINIC | Age: 25
End: 2025-03-10

## 2025-03-12 ENCOUNTER — APPOINTMENT (OUTPATIENT)
Dept: PSYCHIATRY | Facility: CLINIC | Age: 25
End: 2025-03-12

## 2025-03-17 ENCOUNTER — APPOINTMENT (OUTPATIENT)
Dept: PSYCHIATRY | Facility: CLINIC | Age: 25
End: 2025-03-17

## 2025-03-19 ENCOUNTER — APPOINTMENT (OUTPATIENT)
Dept: PSYCHIATRY | Facility: CLINIC | Age: 25
End: 2025-03-19

## 2025-03-24 ENCOUNTER — APPOINTMENT (OUTPATIENT)
Dept: PSYCHIATRY | Facility: CLINIC | Age: 25
End: 2025-03-24

## 2025-03-26 ENCOUNTER — APPOINTMENT (OUTPATIENT)
Dept: PSYCHIATRY | Facility: CLINIC | Age: 25
End: 2025-03-26

## 2025-03-31 ENCOUNTER — APPOINTMENT (OUTPATIENT)
Dept: PSYCHIATRY | Facility: CLINIC | Age: 25
End: 2025-03-31

## 2025-04-02 ENCOUNTER — APPOINTMENT (OUTPATIENT)
Dept: PSYCHIATRY | Facility: CLINIC | Age: 25
End: 2025-04-02

## 2025-04-07 ENCOUNTER — APPOINTMENT (OUTPATIENT)
Dept: PSYCHIATRY | Facility: CLINIC | Age: 25
End: 2025-04-07

## 2025-04-09 ENCOUNTER — APPOINTMENT (OUTPATIENT)
Dept: PSYCHIATRY | Facility: CLINIC | Age: 25
End: 2025-04-09

## 2025-04-14 ENCOUNTER — APPOINTMENT (OUTPATIENT)
Dept: PSYCHIATRY | Facility: CLINIC | Age: 25
End: 2025-04-14

## 2025-04-16 ENCOUNTER — APPOINTMENT (OUTPATIENT)
Dept: PSYCHIATRY | Facility: CLINIC | Age: 25
End: 2025-04-16

## 2025-04-21 ENCOUNTER — APPOINTMENT (OUTPATIENT)
Dept: PSYCHIATRY | Facility: CLINIC | Age: 25
End: 2025-04-21

## 2025-04-23 ENCOUNTER — APPOINTMENT (OUTPATIENT)
Dept: PSYCHIATRY | Facility: CLINIC | Age: 25
End: 2025-04-23

## 2025-04-28 ENCOUNTER — APPOINTMENT (OUTPATIENT)
Dept: PSYCHIATRY | Facility: CLINIC | Age: 25
End: 2025-04-28

## 2025-04-30 ENCOUNTER — APPOINTMENT (OUTPATIENT)
Dept: PSYCHIATRY | Facility: CLINIC | Age: 25
End: 2025-04-30

## 2025-05-05 ENCOUNTER — APPOINTMENT (OUTPATIENT)
Dept: PSYCHIATRY | Facility: CLINIC | Age: 25
End: 2025-05-05

## 2025-05-06 ENCOUNTER — APPOINTMENT (OUTPATIENT)
Dept: PSYCHIATRY | Facility: CLINIC | Age: 25
End: 2025-05-06
Payer: COMMERCIAL

## 2025-05-06 DIAGNOSIS — F50.9 EATING DISORDER, UNSPECIFIED: ICD-10-CM

## 2025-05-06 DIAGNOSIS — F90.0 ATTENTION-DEFICIT HYPERACTIVITY DISORDER, PREDOMINANTLY INATTENTIVE TYPE: ICD-10-CM

## 2025-05-06 DIAGNOSIS — F41.1 GENERALIZED ANXIETY DISORDER: ICD-10-CM

## 2025-05-06 DIAGNOSIS — F33.1 MAJOR DEPRESSIVE DISORDER, RECURRENT, MODERATE: ICD-10-CM

## 2025-05-06 PROCEDURE — 99214 OFFICE O/P EST MOD 30 MIN: CPT | Mod: 95

## 2025-05-06 PROCEDURE — 90833 PSYTX W PT W E/M 30 MIN: CPT | Mod: 95

## 2025-05-06 PROCEDURE — G2211 COMPLEX E/M VISIT ADD ON: CPT | Mod: NC,95

## 2025-05-07 ENCOUNTER — APPOINTMENT (OUTPATIENT)
Dept: PSYCHIATRY | Facility: CLINIC | Age: 25
End: 2025-05-07

## 2025-05-12 ENCOUNTER — APPOINTMENT (OUTPATIENT)
Dept: PSYCHIATRY | Facility: CLINIC | Age: 25
End: 2025-05-12

## 2025-05-14 ENCOUNTER — APPOINTMENT (OUTPATIENT)
Dept: PSYCHIATRY | Facility: CLINIC | Age: 25
End: 2025-05-14

## 2025-05-19 ENCOUNTER — APPOINTMENT (OUTPATIENT)
Dept: PSYCHIATRY | Facility: CLINIC | Age: 25
End: 2025-05-19

## 2025-05-21 ENCOUNTER — APPOINTMENT (OUTPATIENT)
Dept: PSYCHIATRY | Facility: CLINIC | Age: 25
End: 2025-05-21

## 2025-05-28 ENCOUNTER — APPOINTMENT (OUTPATIENT)
Dept: PSYCHIATRY | Facility: CLINIC | Age: 25
End: 2025-05-28

## 2025-06-02 ENCOUNTER — APPOINTMENT (OUTPATIENT)
Dept: PSYCHIATRY | Facility: CLINIC | Age: 25
End: 2025-06-02

## 2025-06-04 ENCOUNTER — APPOINTMENT (OUTPATIENT)
Dept: PSYCHIATRY | Facility: CLINIC | Age: 25
End: 2025-06-04

## 2025-06-09 ENCOUNTER — APPOINTMENT (OUTPATIENT)
Dept: PSYCHIATRY | Facility: CLINIC | Age: 25
End: 2025-06-09

## 2025-06-11 ENCOUNTER — APPOINTMENT (OUTPATIENT)
Dept: PSYCHIATRY | Facility: CLINIC | Age: 25
End: 2025-06-11

## 2025-06-16 ENCOUNTER — APPOINTMENT (OUTPATIENT)
Dept: PSYCHIATRY | Facility: CLINIC | Age: 25
End: 2025-06-16

## 2025-07-01 ENCOUNTER — APPOINTMENT (OUTPATIENT)
Dept: PSYCHIATRY | Facility: CLINIC | Age: 25
End: 2025-07-01
Payer: COMMERCIAL

## 2025-07-01 PROCEDURE — 90833 PSYTX W PT W E/M 30 MIN: CPT | Mod: 95

## 2025-07-01 PROCEDURE — G2211 COMPLEX E/M VISIT ADD ON: CPT | Mod: NC,95

## 2025-07-01 PROCEDURE — 99214 OFFICE O/P EST MOD 30 MIN: CPT | Mod: 95

## 2025-07-31 ENCOUNTER — APPOINTMENT (OUTPATIENT)
Dept: PSYCHIATRY | Facility: CLINIC | Age: 25
End: 2025-07-31

## 2025-08-06 ENCOUNTER — APPOINTMENT (OUTPATIENT)
Dept: PSYCHIATRY | Facility: CLINIC | Age: 25
End: 2025-08-06

## 2025-08-06 DIAGNOSIS — F90.0 ATTENTION-DEFICIT HYPERACTIVITY DISORDER, PREDOMINANTLY INATTENTIVE TYPE: ICD-10-CM

## 2025-08-06 DIAGNOSIS — F33.1 MAJOR DEPRESSIVE DISORDER, RECURRENT, MODERATE: ICD-10-CM

## 2025-08-06 DIAGNOSIS — F41.1 GENERALIZED ANXIETY DISORDER: ICD-10-CM

## 2025-08-06 DIAGNOSIS — F50.9 EATING DISORDER, UNSPECIFIED: ICD-10-CM

## 2025-08-06 PROCEDURE — 99214 OFFICE O/P EST MOD 30 MIN: CPT | Mod: 95

## 2025-08-06 PROCEDURE — G2211 COMPLEX E/M VISIT ADD ON: CPT | Mod: NC,95

## 2025-08-06 PROCEDURE — 90833 PSYTX W PT W E/M 30 MIN: CPT | Mod: 95

## 2025-08-12 ENCOUNTER — APPOINTMENT (OUTPATIENT)
Dept: PSYCHIATRY | Facility: CLINIC | Age: 25
End: 2025-08-12

## 2025-08-19 ENCOUNTER — APPOINTMENT (OUTPATIENT)
Dept: PSYCHIATRY | Facility: CLINIC | Age: 25
End: 2025-08-19

## 2025-08-26 ENCOUNTER — APPOINTMENT (OUTPATIENT)
Dept: PSYCHIATRY | Facility: CLINIC | Age: 25
End: 2025-08-26

## 2025-09-02 ENCOUNTER — APPOINTMENT (OUTPATIENT)
Dept: PSYCHIATRY | Facility: CLINIC | Age: 25
End: 2025-09-02

## 2025-09-05 ENCOUNTER — APPOINTMENT (OUTPATIENT)
Dept: PSYCHIATRY | Facility: CLINIC | Age: 25
End: 2025-09-05
Payer: COMMERCIAL

## 2025-09-05 DIAGNOSIS — F33.1 MAJOR DEPRESSIVE DISORDER, RECURRENT, MODERATE: ICD-10-CM

## 2025-09-05 DIAGNOSIS — F90.0 ATTENTION-DEFICIT HYPERACTIVITY DISORDER, PREDOMINANTLY INATTENTIVE TYPE: ICD-10-CM

## 2025-09-05 DIAGNOSIS — F41.1 GENERALIZED ANXIETY DISORDER: ICD-10-CM

## 2025-09-05 DIAGNOSIS — F50.9 EATING DISORDER, UNSPECIFIED: ICD-10-CM

## 2025-09-05 PROCEDURE — 99214 OFFICE O/P EST MOD 30 MIN: CPT | Mod: 95

## 2025-09-05 PROCEDURE — G2211 COMPLEX E/M VISIT ADD ON: CPT | Mod: NC,95

## 2025-09-05 PROCEDURE — 90833 PSYTX W PT W E/M 30 MIN: CPT | Mod: 95

## 2025-09-05 RX ORDER — DEXTROAMPHETAMINE SACCHARATE, AMPHETAMINE ASPARTATE MONOHYDRATE, DEXTROAMPHETAMINE SULFATE AND AMPHETAMINE SULFATE 5; 5; 5; 5 MG/1; MG/1; MG/1; MG/1
20 CAPSULE, EXTENDED RELEASE ORAL
Qty: 30 | Refills: 0 | Status: ACTIVE | COMMUNITY
Start: 2025-09-05 | End: 1900-01-01

## 2025-09-09 ENCOUNTER — APPOINTMENT (OUTPATIENT)
Dept: PSYCHIATRY | Facility: CLINIC | Age: 25
End: 2025-09-09

## 2025-09-16 ENCOUNTER — APPOINTMENT (OUTPATIENT)
Dept: PSYCHIATRY | Facility: CLINIC | Age: 25
End: 2025-09-16